# Patient Record
Sex: MALE | Race: WHITE | NOT HISPANIC OR LATINO | Employment: OTHER | URBAN - METROPOLITAN AREA
[De-identification: names, ages, dates, MRNs, and addresses within clinical notes are randomized per-mention and may not be internally consistent; named-entity substitution may affect disease eponyms.]

---

## 2017-05-03 ENCOUNTER — AMBULATORY - HEALTHEAST (OUTPATIENT)
Dept: CARDIOLOGY | Facility: CLINIC | Age: 47
End: 2017-05-03

## 2017-05-08 ENCOUNTER — COMMUNICATION - HEALTHEAST (OUTPATIENT)
Dept: TELEHEALTH | Facility: CLINIC | Age: 47
End: 2017-05-08

## 2017-05-08 ENCOUNTER — OFFICE VISIT - HEALTHEAST (OUTPATIENT)
Dept: CARDIOLOGY | Facility: CLINIC | Age: 47
End: 2017-05-08

## 2017-05-08 ENCOUNTER — AMBULATORY - HEALTHEAST (OUTPATIENT)
Dept: CARDIOLOGY | Facility: CLINIC | Age: 47
End: 2017-05-08

## 2017-05-08 DIAGNOSIS — I10 ESSENTIAL HYPERTENSION WITH GOAL BLOOD PRESSURE LESS THAN 130/85: ICD-10-CM

## 2017-05-08 LAB
ATRIAL RATE - MUSE: 77 BPM
DIASTOLIC BLOOD PRESSURE - MUSE: NORMAL MMHG
INTERPRETATION ECG - MUSE: NORMAL
P AXIS - MUSE: 62 DEGREES
PR INTERVAL - MUSE: 154 MS
QRS DURATION - MUSE: 114 MS
QT - MUSE: 390 MS
QTC - MUSE: 441 MS
R AXIS - MUSE: 92 DEGREES
SYSTOLIC BLOOD PRESSURE - MUSE: NORMAL MMHG
T AXIS - MUSE: 16 DEGREES
VENTRICULAR RATE- MUSE: 77 BPM

## 2017-05-08 ASSESSMENT — MIFFLIN-ST. JEOR: SCORE: 2159.46

## 2017-05-22 ENCOUNTER — HOSPITAL ENCOUNTER (OUTPATIENT)
Dept: CARDIOLOGY | Facility: HOSPITAL | Age: 47
Discharge: HOME | End: 2017-05-22
Attending: INTERNAL MEDICINE

## 2017-05-22 DIAGNOSIS — I10 ESSENTIAL HYPERTENSION WITH GOAL BLOOD PRESSURE LESS THAN 130/85: ICD-10-CM

## 2017-05-22 LAB
AORTIC ROOT: 3.4 CM
AORTIC VALVE MEAN VELOCITY: 59.5 CM/S
AORTIC VALVE MEAN VELOCITY: 79.5 CM/S
AORTIC VALVE MEAN VELOCITY: 89.7 CM/S
AV DIMENSIONLESS INDEX VTI: 0.8
AV DIMENSIONLESS INDEX VTI: 1
AV MEAN GRADIENT: 2 MMHG
AV MEAN GRADIENT: 3 MMHG
AV MEAN GRADIENT: 4 MMHG
AV PEAK GRADIENT: 6.3 MMHG
AV PEAK GRADIENT: 6.5 MMHG
AV VALVE AREA: 2.8 CM2
AV VALVE AREA: 3.5 CM2
AV VELOCITY RATIO: 0.7
AV VELOCITY RATIO: 0.7
BSA FOR ECHO PROCEDURE: 2.53 M2
CV BLOOD PRESSURE: NORMAL MMHG
CV ECHO HEIGHT: 74 IN
CV ECHO WEIGHT: 270 LBS
CV STRESS CURRENT BP HE: NORMAL
CV STRESS CURRENT HR HE: 107
CV STRESS CURRENT HR HE: 115
CV STRESS CURRENT HR HE: 115
CV STRESS CURRENT HR HE: 120
CV STRESS CURRENT HR HE: 121
CV STRESS CURRENT HR HE: 122
CV STRESS CURRENT HR HE: 123
CV STRESS CURRENT HR HE: 132
CV STRESS CURRENT HR HE: 137
CV STRESS CURRENT HR HE: 142
CV STRESS CURRENT HR HE: 142
CV STRESS CURRENT HR HE: 144
CV STRESS CURRENT HR HE: 148
CV STRESS CURRENT HR HE: 148
CV STRESS CURRENT HR HE: 153
CV STRESS CURRENT HR HE: 159
CV STRESS CURRENT HR HE: 75
CV STRESS CURRENT HR HE: 77
CV STRESS CURRENT HR HE: 79
CV STRESS CURRENT HR HE: 90
CV STRESS CURRENT HR HE: 92
CV STRESS CURRENT HR HE: 94
CV STRESS CURRENT HR HE: 96
CV STRESS CURRENT HR HE: 97
CV STRESS CURRENT HR HE: 99
CV STRESS DEVIATION TIME HE: NORMAL
CV STRESS ECHO PERCENT HR HE: NORMAL
CV STRESS EXERCISE STAGE HE: NORMAL
CV STRESS EXERCISE STAGE REACHED HE: NORMAL
CV STRESS FINAL RESTING BP HE: NORMAL
CV STRESS FINAL RESTING HR HE: 92
CV STRESS MAX HR HE: 159
CV STRESS MAX TREADMILL GRADE HE: 14
CV STRESS MAX TREADMILL SPEED HE: 3.4
CV STRESS PEAK DIA BP HE: NORMAL
CV STRESS PEAK SYS BP HE: NORMAL
CV STRESS PHASE HE: NORMAL
CV STRESS PROTOCOL HE: NORMAL
CV STRESS RESTING PT POSITION HE: NORMAL
CV STRESS RESTING PT POSITION HE: NORMAL
CV STRESS ST DEVIATION AMOUNT HE: NORMAL
CV STRESS ST DEVIATION ELEVATION HE: NORMAL
CV STRESS ST EVELATION AMOUNT HE: NORMAL
CV STRESS TEST TYPE HE: NORMAL
CV STRESS TOTAL STAGE TIME MIN 1 HE: NORMAL
DOP CALC AO PEAK VEL: 123 CM/S
DOP CALC AO PEAK VEL: 125 CM/S
DOP CALC AO PEAK VEL: 127 CM/S
DOP CALC AO PEAK VEL: 127 CM/S
DOP CALC AO VTI: 18 CM
DOP CALC AO VTI: 22.1 CM
DOP CALC AO VTI: 23.9 CM
DOP CALC LVOT AREA: 3.46 CM2
DOP CALC LVOT AREA: 3.46 CM2
DOP CALC LVOT DIAMETER: 2.1 CM
DOP CALC LVOT DIAMETER: 2.1 CM
DOP CALC LVOT PEAK VEL: 87.2 CM/S
DOP CALC LVOT PEAK VEL: 87.6 CM/S
DOP CALC LVOT PEAK VEL: 87.9 CM/S
DOP CALC LVOT PEAK VEL: 87.9 CM/S
DOP CALC LVOT STROKE VOLUME: 62 CM3
DOP CALC LVOT STROKE VOLUME: 62.3 CM3
DOP CALCLVOT PEAK VEL VTI: 17.9 CM
DOP CALCLVOT PEAK VEL VTI: 18 CM
EJECTION FRACTION: 65 % (ref 55–75)
FRACTIONAL SHORTENING: 41.8 % (ref 28–44)
FRACTIONAL SHORTENING: 41.8 % (ref 28–44)
INTERVENTRICULAR SEPTUM IN END DIASTOLE: 1.2 CM (ref 0.6–1)
INTERVENTRICULAR SEPTUM IN END DIASTOLE: 1.2 CM (ref 0.6–1)
IVS/PW RATIO: 0.8
IVS/PW RATIO: 0.9
LA AREA 1: 16.5 CM2
LA AREA 1: 16.5 CM2
LA AREA 1: 17 CM2
LA AREA 2: 16 CM2
LA AREA 2: 16.1 CM2
LA AREA 2: 16.1 CM2
LEFT ATRIUM LENGTH: 4.9 CM
LEFT ATRIUM LENGTH: 4.9 CM
LEFT ATRIUM LENGTH: 5.3 CM
LEFT ATRIUM SIZE: 3.3 CM
LEFT ATRIUM TO AORTIC ROOT RATIO: 1 NO UNITS
LEFT ATRIUM VOLUME INDEX: 18.6 ML/M2
LEFT ATRIUM VOLUME: 46.1 CM3
LEFT ATRIUM VOLUME: 47.2 CM3
LEFT VENTRICLE CARDIAC INDEX: 2.1 L/MIN/M2
LEFT VENTRICLE CARDIAC OUTPUT: 5.2 L/MIN
LEFT VENTRICLE CARDIAC OUTPUT: 5.2 L/MIN
LEFT VENTRICLE DIASTOLIC VOLUME: 106 CM3 (ref 62–150)
LEFT VENTRICLE HEART RATE: 84 BPM
LEFT VENTRICLE HEART RATE: 84 BPM
LEFT VENTRICLE MASS INDEX: 120.3 G/M2
LEFT VENTRICLE SYSTOLIC VOLUME: 37.5 CM3 (ref 21–61)
LEFT VENTRICULAR INTERNAL DIMENSION IN DIASTOLE: 5.5 CM (ref 4.2–5.8)
LEFT VENTRICULAR INTERNAL DIMENSION IN DIASTOLE: 5.5 CM (ref 4.2–5.8)
LEFT VENTRICULAR INTERNAL DIMENSION IN SYSTOLE: 3.2 CM (ref 2.5–4)
LEFT VENTRICULAR INTERNAL DIMENSION IN SYSTOLE: 3.2 CM (ref 2.5–4)
LEFT VENTRICULAR MASS: 304.3 G
LEFT VENTRICULAR MASS: 320.9 G
LEFT VENTRICULAR OUTFLOW TRACT MEAN GRADIENT: 2 MMHG
LEFT VENTRICULAR OUTFLOW TRACT MEAN GRADIENT: 2 MMHG
LEFT VENTRICULAR OUTFLOW TRACT MEAN VELOCITY: 59.2 CM/S
LEFT VENTRICULAR OUTFLOW TRACT MEAN VELOCITY: 59.5 CM/S
LEFT VENTRICULAR OUTFLOW TRACT PEAK GRADIENT: 3 MMHG
LEFT VENTRICULAR OUTFLOW TRACT PEAK GRADIENT: 3 MMHG
LEFT VENTRICULAR POSTERIOR WALL IN END DIASTOLE: 1.4 CM (ref 0.6–1)
LEFT VENTRICULAR POSTERIOR WALL IN END DIASTOLE: 1.5 CM (ref 0.6–1)
LV STROKE VOLUME INDEX: 24.6 ML/M2
MITRAL VALVE E/A RATIO: 1.4
MITRAL VALVE E/A RATIO: 1.4
MV DECELERATION TIME: 158 MS
MV DECELERATION TIME: 158 MS
MV PEAK A VELOCITY: 46 CM/S
MV PEAK A VELOCITY: 46.6 CM/S
MV PEAK A VELOCITY: 46.6 CM/S
MV PEAK E VELOCITY: 66 CM/S
NUC REST DIASTOLIC VOLUME INDEX: 4320 LBS
NUC REST SYSTOLIC VOLUME INDEX: 74 IN
NUC STRESS EJECTION FRACTION: 100 %
RIGHT VENTRICULAR INTERNAL DIMENSION IN DYSTOLE: 2.9 CM
STRESS ECHO BASELINE BP: NORMAL
STRESS ECHO BASELINE HR: 77
STRESS ECHO CALCULATED PERCENT HR: 91 %
STRESS ECHO LAST STRESS BP: NORMAL
STRESS ECHO LAST STRESS HR: 159
STRESS ECHO POST ESTIMATED WORKLOAD: 9.7
STRESS ECHO POST EXERCISE DUR MIN: 8
STRESS ECHO POST EXERCISE DUR SEC: 0
STRESS ECHO TARGET HR: 159.25
TRICUSPID VALVE ANULAR PLANE SYSTOLIC EXCURSION: 2.6 CM

## 2017-05-22 ASSESSMENT — MIFFLIN-ST. JEOR: SCORE: 2159.46

## 2017-06-02 ENCOUNTER — AMBULATORY - HEALTHEAST (OUTPATIENT)
Dept: CARDIOLOGY | Facility: CLINIC | Age: 47
End: 2017-06-02

## 2017-06-02 DIAGNOSIS — I10 ESSENTIAL HYPERTENSION WITH GOAL BLOOD PRESSURE LESS THAN 130/85: ICD-10-CM

## 2017-06-09 ENCOUNTER — RECORDS - HEALTHEAST (OUTPATIENT)
Dept: ADMINISTRATIVE | Facility: OTHER | Age: 47
End: 2017-06-09

## 2017-06-30 ENCOUNTER — OFFICE VISIT - HEALTHEAST (OUTPATIENT)
Dept: SLEEP MEDICINE | Facility: CLINIC | Age: 47
End: 2017-06-30

## 2017-06-30 ENCOUNTER — HOSPITAL ENCOUNTER (OUTPATIENT)
Dept: ULTRASOUND IMAGING | Facility: HOSPITAL | Age: 47
Discharge: HOME | End: 2017-06-30
Attending: SURGERY

## 2017-06-30 DIAGNOSIS — G47.8 SLEEP DYSFUNCTION WITH SLEEP STAGE DISTURBANCE: ICD-10-CM

## 2017-06-30 DIAGNOSIS — R06.83 SNORING: ICD-10-CM

## 2017-06-30 DIAGNOSIS — R29.818 SUSPECTED SLEEP APNEA: ICD-10-CM

## 2017-06-30 DIAGNOSIS — E04.1 RIGHT THYROID NODULE: ICD-10-CM

## 2017-06-30 DIAGNOSIS — G47.10 HYPERSOMNIA, UNSPECIFIED: ICD-10-CM

## 2017-06-30 DIAGNOSIS — H91.90 PERCEIVED HEARING CHANGES: ICD-10-CM

## 2017-06-30 ASSESSMENT — MIFFLIN-ST. JEOR: SCORE: 2143.59

## 2017-07-02 ENCOUNTER — RECORDS - HEALTHEAST (OUTPATIENT)
Dept: SLEEP MEDICINE | Facility: CLINIC | Age: 47
End: 2017-07-02

## 2017-07-02 ENCOUNTER — RECORDS - HEALTHEAST (OUTPATIENT)
Dept: ADMINISTRATIVE | Facility: OTHER | Age: 47
End: 2017-07-02

## 2017-07-02 DIAGNOSIS — G47.10 HYPERSOMNIA, UNSPECIFIED: ICD-10-CM

## 2017-07-02 DIAGNOSIS — G47.30 SLEEP APNEA, UNSPECIFIED: ICD-10-CM

## 2017-07-02 DIAGNOSIS — G47.8 OTHER SLEEP DISORDERS: ICD-10-CM

## 2017-07-02 DIAGNOSIS — R06.83 SNORING: ICD-10-CM

## 2017-07-07 ENCOUNTER — COMMUNICATION - HEALTHEAST (OUTPATIENT)
Dept: SLEEP MEDICINE | Facility: CLINIC | Age: 47
End: 2017-07-07

## 2017-08-17 ENCOUNTER — TRANSFERRED RECORDS (OUTPATIENT)
Dept: HEALTH INFORMATION MANAGEMENT | Facility: CLINIC | Age: 47
End: 2017-08-17

## 2017-08-18 ENCOUNTER — AMBULATORY - HEALTHEAST (OUTPATIENT)
Dept: SCHEDULING | Facility: CLINIC | Age: 47
End: 2017-08-18

## 2017-08-18 DIAGNOSIS — E66.3 OVERWEIGHT: ICD-10-CM

## 2017-08-21 ENCOUNTER — HOSPITAL ENCOUNTER (OUTPATIENT)
Dept: NUTRITION | Facility: HOSPITAL | Age: 47
Discharge: HOME | End: 2017-08-21
Attending: FAMILY MEDICINE

## 2017-08-21 DIAGNOSIS — E66.3 OVERWEIGHT: ICD-10-CM

## 2017-08-29 ENCOUNTER — COMMUNICATION - HEALTHEAST (OUTPATIENT)
Dept: SLEEP MEDICINE | Facility: CLINIC | Age: 47
End: 2017-08-29

## 2017-09-18 ENCOUNTER — OFFICE VISIT - HEALTHEAST (OUTPATIENT)
Dept: SLEEP MEDICINE | Facility: CLINIC | Age: 47
End: 2017-09-18

## 2017-09-18 DIAGNOSIS — R79.9 ABNORMAL BLOOD CHEMISTRY: ICD-10-CM

## 2017-09-18 DIAGNOSIS — R06.83 SNORING: ICD-10-CM

## 2017-09-18 DIAGNOSIS — G25.81 RESTLESS LEG SYNDROME: ICD-10-CM

## 2017-09-18 DIAGNOSIS — G47.10 HYPERSOMNIA, UNSPECIFIED: ICD-10-CM

## 2017-09-18 ASSESSMENT — MIFFLIN-ST. JEOR: SCORE: 2128.62

## 2017-09-19 ENCOUNTER — COMMUNICATION - HEALTHEAST (OUTPATIENT)
Dept: SLEEP MEDICINE | Facility: CLINIC | Age: 47
End: 2017-09-19

## 2017-11-03 ENCOUNTER — OFFICE VISIT - HEALTHEAST (OUTPATIENT)
Dept: SLEEP MEDICINE | Facility: CLINIC | Age: 47
End: 2017-11-03

## 2017-11-03 DIAGNOSIS — G47.8 SLEEP DYSFUNCTION WITH SLEEP STAGE DISTURBANCE: ICD-10-CM

## 2017-11-03 DIAGNOSIS — G47.10 HYPERSOMNIA: ICD-10-CM

## 2017-11-03 DIAGNOSIS — R79.9 ABNORMAL BLOOD CHEMISTRY: ICD-10-CM

## 2017-11-03 DIAGNOSIS — G25.81 RESTLESS LEG SYNDROME: ICD-10-CM

## 2017-11-03 ASSESSMENT — MIFFLIN-ST. JEOR: SCORE: 2112.29

## 2018-01-21 ENCOUNTER — OFFICE VISIT - HEALTHEAST (OUTPATIENT)
Dept: FAMILY MEDICINE | Facility: CLINIC | Age: 48
End: 2018-01-21

## 2018-01-21 DIAGNOSIS — R05.9 COUGH: ICD-10-CM

## 2018-01-21 DIAGNOSIS — R07.0 THROAT PAIN: ICD-10-CM

## 2018-01-21 LAB — DEPRECATED S PYO AG THROAT QL EIA: NORMAL

## 2018-01-22 LAB — GROUP A STREP BY PCR: NORMAL

## 2018-04-11 DIAGNOSIS — M17.11 PRIMARY OSTEOARTHRITIS OF RIGHT KNEE: Primary | ICD-10-CM

## 2018-04-11 ASSESSMENT — ENCOUNTER SYMPTOMS
FATIGUE: 1
EXERCISE INTOLERANCE: 0
WEIGHT LOSS: 0
ORTHOPNEA: 0
MUSCLE WEAKNESS: 0
JOINT SWELLING: 0
MYALGIAS: 1
POLYPHAGIA: 0
STIFFNESS: 1
PALPITATIONS: 0
SLEEP DISTURBANCES DUE TO BREATHING: 0
NIGHT SWEATS: 0
SYNCOPE: 0
HYPOTENSION: 0
LEG PAIN: 1
DECREASED APPETITE: 0
HYPERTENSION: 1
MUSCLE CRAMPS: 0
POLYDIPSIA: 0
CHILLS: 0
ARTHRALGIAS: 1
NECK PAIN: 1
LIGHT-HEADEDNESS: 0
ALTERED TEMPERATURE REGULATION: 0
BACK PAIN: 0
HALLUCINATIONS: 0
FEVER: 0
WEIGHT GAIN: 1
INCREASED ENERGY: 0

## 2018-04-12 ENCOUNTER — RADIANT APPOINTMENT (OUTPATIENT)
Dept: GENERAL RADIOLOGY | Facility: CLINIC | Age: 48
End: 2018-04-12
Attending: NURSE PRACTITIONER
Payer: COMMERCIAL

## 2018-04-12 ENCOUNTER — OFFICE VISIT (OUTPATIENT)
Dept: ORTHOPEDICS | Facility: CLINIC | Age: 48
End: 2018-04-12
Payer: COMMERCIAL

## 2018-04-12 DIAGNOSIS — M17.12 PRIMARY OSTEOARTHRITIS OF LEFT KNEE: ICD-10-CM

## 2018-04-12 DIAGNOSIS — M17.31 POST-TRAUMATIC OSTEOARTHRITIS OF RIGHT KNEE: Primary | ICD-10-CM

## 2018-04-12 DIAGNOSIS — M17.31 POST-TRAUMATIC OSTEOARTHRITIS OF RIGHT KNEE: ICD-10-CM

## 2018-04-12 ASSESSMENT — PAIN SCALES - GENERAL: PAINLEVEL: MODERATE PAIN (5)

## 2018-04-12 NOTE — LETTER
"4/12/2018       RE: Aydin Aldridge  [address] [facility] [place] [address]     Dear Colleague,    Thank you for referring your patient, Aydin Aldridge, to the [facility] at [facility]. Please see a copy of my visit note below.    Cc: bilateral knee pain    HPI:  Aydin is a previous well known patient to myself and Dr. Chang who underwent a right femoral lengthening procedure with external fixation about 12 years ago. He has post traumatic right knee osteoarthritis to which he has managed well with stretching, nsaids, and judicious use of steroid injections and Supartz. He rates his right knee pain as \"consistent 5\" but worse at times depending on activity. Has activity related swelling. Has night pain. Denies instability \"just aches\".     Left knee reveals increased achiness over last few months without injuries, surgeries, falls, twists, or trauma. Localizes the pain as mostly medial. Denies mechanical symptoms. With a lot of activity he does report \"some swelling\". Takes occasional advil which \"takes the edge off\" otherwise just \"guy through it\". Rates left knee pain as \"6 or 7\". Notices the \"intense ache more at night and with stairs\". He has attempted on multiple occasions various types of physical therapy and strengthening exercises that have not provided adequate relief.     PMH: Reviewed    ROS: Reviewed    Meds: Reviewed    ALL: Reviewed    FH/SH: Reviewed-nonsmoker--    PE:  Pleasant and cooperative male alert and oriented x3. Walks with a slight limp favoring right knee with the inability to fully extend on walk through phase of gait. Arises from chair unguarded. Slight limp to gait noted. Right knee reveals - degrees of motion. Strength is symmetrical at 5/5. Incisions are all well healed/intact. No erythema and nontender to palpate surrounding tissue. Medial and lateral joint space has tenderness to palpate. Patella has crepitus " that is not painful with near normal alignment without subluxation. No calf pain. Lachmans exam has an endpoint but soft in nature. Mild pseudolaxity in valgus/varus in 0, 45, and 90 degrees. Mild joint effusion. No palpable bakers cyst. Alignment is near neutral with slight varus. + CMS    Left knee reveals 0-122 degrees of motion without a significant joint effusion. No erythema. Tenderness noted to palpate medial joint line. Nontender to lateral joint. Patella tracks appropriately without pain and in normal alignment without subluxation. Skin intact without rashes, scars, bruises, or lesions. Negative lachmans exam. Minimal pseudolaxity on valgus/varus stress at 0, 45, and 90 degrees. Quad strength 5/5. No calf pain without neurological compromise. Alignment is near neutral.    Xrays taken:      Dx:  1. Post traumatic right knee osteoarthritis  2. 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 The natural plan of care and discussion including activity modification, assistive devices, nsaids, injections, therapy, and strengthening. He is requesting a repeat of Supartz at this time to the right knee and possiblity a cortisone to the left knee.  Informed consent obtained.      Injection #1 Left knee cortisone injection    Pre Procedure dx: left knee osteoarthritis    Post Procedure: left knee osteoarthritis    Procedure:  Informed consent obtained. Under sterile technique, the anterior lateral aspect of left knee was prepped with chlorahexadine. 1cc of 40 mg of kenalog and 2 cc of lidocaine was injected into the joint space without difficulty.      Injection #2 Right knee supartz injection    Pre Procedure dx: right knee osteoarthritis    Post Procedure: right knee osteoarthritis    Procedure:  Informed consent obtained. Under sterile technique, the anterior lateral aspect of the right knee was prepped with chlorahexadine. Supartz was injected into the lateral joint without difficulty.  Will  follow up next week or 2nd injection.       Again, thank you for allowing me to participate in the care of your patient.      Sincerely,    GENEVIEVE Sykes CNP

## 2018-04-12 NOTE — NURSING NOTE
Reason For Visit:   Chief Complaint   Patient presents with     RECHECK     patient is here for his #3 supartz to the right knee       Patient states that his pain level varies from 5-7    The following medication was given:     MEDICATION: supartz  ROUTE: IA  SITE: right knee   DOSE: 2.5cc  LOT #: [id number]  :  Seikagaku   EXPIRATION DATE:  07-    MEDICATION: kenolag-40  ROUTE: IA  SITE: left knee  DOSE: 1cc  LOT #: [id number]  :  Triggit  EXPIRATION DATE:    NDC: 8051-9666-03             Carlene Brizuela CMA

## 2018-04-12 NOTE — MR AVS SNAPSHOT
After Visit Summary   4/12/2018    Aydin Aldridge    MRN: [id number]           Patient Information     Date Of Birth          1970        Visit Information        Provider Department      4/12/2018 10:30 AM Romina Hartman APRN CNP [facility]        Today's Diagnoses     Post-traumatic osteoarthritis of right knee    -  1    Primary osteoarthritis of left knee           Follow-ups after your visit        Follow-up notes from your care team     Return in about 1 year (around 4/12/2019).      Your next 10 appointments already scheduled     Apr 19, 2018 10:15 AM CDT   (Arrive by 10:00 AM)   Return Visit with GENEVIEVE Hartley CNP   [facility] ([facility])    [address] [place] [address] [phone]            Apr 26, 2018 10:15 AM CDT   (Arrive by 10:00 AM)   Return Visit with GENEVIEVE Hartley CNP   [facility] ([facility])    [address] [place] [address] [phone]            May 21, 2018  8:30 AM CDT   (Arrive by 8:15 AM)   RETURN KNEE with Tyrese Chang MD   [facility] ([facility])    [address] [place] [address] [phone]              Who to contact     Please call your clinic at [phone] to:    Ask questions about your health    Make or cancel appointments    Discuss your medicines    Learn about your test results    Speak to your doctor            Additional Information About Your Visit        MyChart Information     [employer] gives you secure access to your electronic health record. If you see a primary care provider, you can also send messages to your care team and make appointments. If you have questions, please call your primary care clinic.  If you do not have a primary care provider, please call [phone] and they will assist  you.      [employer] is an electronic gateway that provides easy, online access to your medical records. With [employer], you can request a clinic appointment, read your test results, renew a prescription or communicate with your care team.     To access your existing account, please contact your [facility] or call [phone] for assistance.        Care EveryWhere ID     This is your Care EveryWhere ID. This could be used by other organizations to access your [facility] medical records  [id number]         Blood Pressure from Last 3 Encounters:   05/07/04 140/76   04/12/04 138/82   08/18/03 132/90    Weight from Last 3 Encounters:   05/07/04 97.1 kg (214 lb)   04/12/04 96.6 kg (213 lb)   08/18/03 95.7 kg (211 lb)                 Today's Medication Changes          These changes are accurate as of 4/12/18  3:46 PM.  If you have any questions, ask your nurse or doctor.               Start taking these medicines.        Dose/Directions    Hylan 16 MG/2ML Sosy   Used for:  Post-traumatic osteoarthritis of right knee   Started by:  Romina Hartman, APRN CNP        Dose:  1 Syringe   1 Syringe by INTRA-ARTICULAR route once for 1 dose   Quantity:  1 Syringe   Refills:  0            Where to get your medicines      Some of these will need a paper prescription and others can be bought over the counter.  Ask your nurse if you have questions.     You don't need a prescription for these medications     Hylan 16 MG/2ML Sosy                Primary Care Provider Office Phone # Fax #    Hannah GENEVIEVE Dobbins [phone]       [employer] [address] [facility] [address]        Equal Access to Services     [facility] : Hadii aad ku hadasho Soomaali, waaxda luqadaha, qaybta kaalmada mark, dary trujillo. So [facility] [phone].    ATENCIÓN: Si habla español, tiene a flowers disposición servicios gratuitos de asistencia lingüística. Llame al  [phone].    We comply with applicable federal civil rights laws and [place] laws. We do not discriminate on the basis of race, color, national origin, age, disability, sex, sexual orientation, or gender identity.            Thank you!     Thank you for choosing [facility]  for your care. Our goal is always to provide you with excellent care. Hearing back from our patients is one way we can continue to improve our services. Please take a few minutes to complete the written survey that you may receive in the mail after your visit with us. Thank you!             Your Updated Medication List - Protect others around you: Learn how to safely use, store and throw away your medicines at www.disposemymeds.org.          This list is accurate as of 4/12/18  3:46 PM.  Always use your most recent med list.                   Brand Name Dispense Instructions for use Diagnosis    Hylan 16 MG/2ML Sosy     1 Syringe    1 Syringe by INTRA-ARTICULAR route once for 1 dose    Post-traumatic osteoarthritis of right knee       MULTIVITAMIN ADULT PO           OMEPRAZOLE PO           sodium hyaluronate 25 MG/2.5ML injection    SUPARTZ    1 Syringe    2.5 mLs (25 mg) by INTRA-ARTICULAR route once a week    Primary osteoarthritis of right knee

## 2018-04-12 NOTE — PROGRESS NOTES
"Cc: bilateral knee pain    HPI:  Aydin is a previous well known patient to myself and Dr. Chang who underwent a right femoral lengthening procedure with external fixation about 12 years ago. He has post traumatic right knee osteoarthritis to which he has managed well with stretching, nsaids, and judicious use of steroid injections and Supartz. He rates his right knee pain as \"consistent 5\" but worse at times depending on activity. Has activity related swelling. Has night pain. Denies instability \"just aches\".     Left knee reveals increased achiness over last few months without injuries, surgeries, falls, twists, or trauma. Localizes the pain as mostly medial. Denies mechanical symptoms. With a lot of activity he does report \"some swelling\". Takes occasional advil which \"takes the edge off\" otherwise just \"guy through it\". Rates left knee pain as \"6 or 7\". Notices the \"intense ache more at night and with stairs\". He has attempted on multiple occasions various types of physical therapy and strengthening exercises that have not provided adequate relief.     PMH: Reviewed    ROS: Reviewed    Meds: Reviewed    ALL: Reviewed    FH/SH: Reviewed-nonsmoker--    PE:  Pleasant and cooperative male alert and oriented x3. Walks with a slight limp favoring right knee with the inability to fully extend on walk through phase of gait. Arises from chair unguarded. Slight limp to gait noted. Right knee reveals - degrees of motion. Strength is symmetrical at 5/5. Incisions are all well healed/intact. No erythema and nontender to palpate surrounding tissue. Medial and lateral joint space has tenderness to palpate. Patella has crepitus that is not painful with near normal alignment without subluxation. No calf pain. Lachmans exam has an endpoint but soft in nature. Mild pseudolaxity in valgus/varus in 0, 45, and 90 degrees. Mild joint effusion. No palpable bakers cyst. Alignment is near neutral with slight varus. + " CMS    Left knee reveals 0-122 degrees of motion without a significant joint effusion. No erythema. Tenderness noted to palpate medial joint line. Nontender to lateral joint. Patella tracks appropriately without pain and in normal alignment without subluxation. Skin intact without rashes, scars, bruises, or lesions. Negative lachmans exam. Minimal pseudolaxity on valgus/varus stress at 0, 45, and 90 degrees. Quad strength 5/5. No calf pain without neurological compromise. Alignment is near neutral.    Xrays taken:      Dx:  1. Post traumatic right knee osteoarthritis  2. 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 The natural plan of care and discussion including activity modification, assistive devices, nsaids, injections, therapy, and strengthening. He is requesting a repeat of Supartz at this time to the right knee and possiblity a cortisone to the left knee.  Informed consent obtained.      Injection #1 Left knee cortisone injection    Pre Procedure dx: left knee osteoarthritis    Post Procedure: left knee osteoarthritis    Procedure:  Informed consent obtained. Under sterile technique, the anterior lateral aspect of left knee was prepped with chlorahexadine. 1cc of 40 mg of kenalog and 2 cc of lidocaine was injected into the joint space without difficulty.      Injection #2 Right knee supartz injection    Pre Procedure dx: right knee osteoarthritis    Post Procedure: right knee osteoarthritis    Procedure:  Informed consent obtained. Under sterile technique, the anterior lateral aspect of the right knee was prepped with chlorahexadine. Supartz was injected into the lateral joint without difficulty.  Will follow up next week or 2nd injection.       Answers for HPI/ROS submitted by the patient on 4/11/2018   General Symptoms: Yes  Skin Symptoms: No  HENT Symptoms: No  EYE SYMPTOMS: No  HEART SYMPTOMS: Yes  LUNG SYMPTOMS: No  INTESTINAL SYMPTOMS: No  URINARY SYMPTOMS: No  REPRODUCTIVE  SYMPTOMS: No  SKELETAL SYMPTOMS: Yes  BLOOD SYMPTOMS: No  NERVOUS SYSTEM SYMPTOMS: No  MENTAL HEALTH SYMPTOMS: No  Fever: No  Loss of appetite: No  Weight loss: No  Weight gain: Yes  Fatigue: Yes  Night sweats: No  Chills: No  Increased stress: Yes  Excessive hunger: No  Excessive thirst: No  Feeling hot or cold when others believe the temperature is normal: No  Loss of height: No  Post-operative complications: No  Surgical site pain: No  Hallucinations: No  Change in or Loss of Energy: No  Hyperactivity: No  Confusion: No  Chest pain or pressure: No  Fast or irregular heartbeat: No  Pain in legs with walking: Yes  Trouble breathing while lying down: No  Fingers or toes appear blue: No  High blood pressure: Yes  Low blood pressure: No  Fainting: No  Murmurs: No  Pacemaker: No  Varicose veins: No  Edema or swelling: No  Wake up at night with shortness of breath: No  Light-headedness: No  Exercise intolerance: No  Back pain: No  Muscle aches: Yes  Neck pain: Yes  Swollen joints: No  Joint pain: Yes  Bone pain: No  Muscle cramps: No  Muscle weakness: No  Joint stiffness: Yes  Bone fracture: No

## 2018-04-18 ENCOUNTER — DOCUMENTATION ONLY (OUTPATIENT)
Dept: ORTHOPEDICS | Facility: CLINIC | Age: 48
End: 2018-04-18

## 2018-04-18 NOTE — PROGRESS NOTES
Pt was seen last week for visco supplement injection Supartz right knee, steroid left knee.  [facility] gave us notice that pt's insurance no longer covers Supartz.  [facility] was phoned regarding switching visco supplement to covered injection Synvisc and we were told there is no evidence for contraindication changing from one to a similar visco supplement injection if keeping within the dame dosing periods, once weekly for three weeks.  Synvisc injections have been authorized.  Pt was called three times, once today, two days ago, and last week, he did not answer.  We will update pt tomorrow in clinic.

## 2018-04-19 ENCOUNTER — OFFICE VISIT (OUTPATIENT)
Dept: ORTHOPEDICS | Facility: CLINIC | Age: 48
End: 2018-04-19
Payer: COMMERCIAL

## 2018-04-19 DIAGNOSIS — M17.32 POST-TRAUMATIC OSTEOARTHRITIS OF LEFT KNEE: Primary | ICD-10-CM

## 2018-04-19 NOTE — PROGRESS NOTES
"Discussion held prior to injection on insurance coverage with injections. Last week we received authorization to proceed with Supartz injection to his R knee that he has received \"for many years\". He reports his insurance has not changed. After the injection was given, we received word that \"insurance does not cover supartz any longer\". Financial counselor informed and patient will be in contact with them for financial coverage in regards to this.   At this time, the  noted \"no contraindication to now do synvisc injections\".  Patients informed consent was obtained. Risks, benefits, complications were discussed including acute swelling, shortness of breath, and increased knee pain.    Pre Procedure dx: left knee osteoarthritis    Post Procedure dx: left knee osteoarthritis    Procedure: Under sterile technique, the anterior lateral aspect of the knee was prepped with chlorahexadine. synvisc was injected into the lateral joint space without difficulty. Had patient stay for 10 minutes post injection. No obvious reaction or side effects noted.  Will follow up next week.  "

## 2018-04-19 NOTE — LETTER
"4/19/2018       RE: Aydin Aldridge  [address] [facility] [address]     Dear Colleague,    Thank you for referring your patient, Aydin Aldridge, to the [facility] at [facility]. Please see a copy of my visit note below.    Discussion held prior to injection on insurance coverage with injections. Last week we received authorization to proceed with Supartz injection to his R knee that he has received \"for many years\". He reports his insurance has not changed. After the injection was given, we received word that \"insurance does not cover supartz any longer\". Financial counselor informed and patient will be in contact with them for financial coverage in regards to this.   At this time, the  noted \"no contraindication to now do synvisc injections\".  Patients informed consent was obtained. Risks, benefits, complications were discussed including acute swelling, shortness of breath, and increased knee pain.    Pre Procedure dx: left knee osteoarthritis    Post Procedure dx: left knee osteoarthritis    Procedure: Under sterile technique, the anterior lateral aspect of the knee was prepped with chlorahexadine. synvisc was injected into the lateral joint space without difficulty. Had patient stay for 10 minutes post injection. No obvious reaction or side effects noted.  Will follow up next week.    Again, thank you for allowing me to participate in the care of your patient.      Sincerely,    GENEVIEVE Sykes CNP      "

## 2018-04-19 NOTE — MR AVS SNAPSHOT
After Visit Summary   4/19/2018    Aydin Aldridge    MRN: [id number]           Patient Information     Date Of Birth          1970        Visit Information        Provider Department      4/19/2018 10:15 AM Romina Hartman APRN CNP [facility] [place]        Today's Diagnoses     Post-traumatic osteoarthritis of left knee    -  1       Follow-ups after your visit        Your next 10 appointments already scheduled     Apr 26, 2018 10:15 AM CDT   (Arrive by 10:00 AM)   Return Visit with GENEVIEVE Hartley CNP [facility] ([facility])    [address] [place] [address] [phone]            May 21, 2018  8:30 AM CDT   (Arrive by 8:15 AM)   RETURN KNEE with MD JUDY Nguyen [facility] [place] ([facility])    [address] [place] [address] [phone]              Who to contact     Please call your clinic at [phone] to:    Ask questions about your health    Make or cancel appointments    Discuss your medicines    Learn about your test results    Speak to your doctor            Additional Information About Your Visit        MyChart Information     [employer] gives you secure access to your electronic health record. If you see a primary care provider, you can also send messages to your care team and make appointments. If you have questions, please call your primary care clinic.  If you do not have a primary care provider, please call [phone] and they will assist you.      [employer] is an electronic gateway that provides easy, online access to your medical records. With [employer], you can request a clinic appointment, read your test results, renew a prescription or communicate with your care team.     To access your existing account, please contact your [facility] or call [phone] for assistance.        Care  EveryWhere ID     This is your Care EveryWhere ID. This could be used by other organizations to access your [facility] medical records  [id number]         Blood Pressure from Last 3 Encounters:   05/07/04 140/76   04/12/04 138/82   08/18/03 132/90    Weight from Last 3 Encounters:   05/07/04 97.1 kg (214 lb)   04/12/04 96.6 kg (213 lb)   08/18/03 95.7 kg (211 lb)              We Performed the Following     Large Joint/Bursa injection and/or drainage - Unilateral (Shoulder, Knee) [20610]          Today's Medication Changes          These changes are accurate as of 4/19/18  2:50 PM.  If you have any questions, ask your nurse or doctor.               Start taking these medicines.        Dose/Directions    Hylan 16 MG/2ML Sosy   Used for:  Post-traumatic osteoarthritis of left knee        Dose:  1 Syringe   1 Syringe by INTRA-ARTICULAR route once a week   Quantity:  1 Syringe   Refills:  1            Where to get your medicines      Some of these will need a paper prescription and others can be bought over the counter.  Ask your nurse if you have questions.     Bring a paper prescription for each of these medications     Hylan 16 MG/2ML Sosy                Primary Care Provider Office Phone # Fax #    Hannah GENEVIEVE Dobbins [phone]       [facility] [address] [facility] [address]        Equal Access to Services     MONICA NICHOLAS AH: Hadii nehemias ku hadasho Soomaali, waaxda luqadaha, qaybta kaalmada adeegyada, dary trujillo. So [facility] [phone].    ATENCIÓN: Si habla español, tiene a flowers disposición servicios gratuitos de asistencia lingüística. Llame al [phone].    We comply with applicable federal civil rights laws and [place] laws. We do not discriminate on the basis of race, color, national origin, age, disability, sex, sexual orientation, or gender identity.            Thank you!     Thank you for choosing [facility]  for your  care. Our goal is always to provide you with excellent care. Hearing back from our patients is one way we can continue to improve our services. Please take a few minutes to complete the written survey that you may receive in the mail after your visit with us. Thank you!             Your Updated Medication List - Protect others around you: Learn how to safely use, store and throw away your medicines at www.disposemymeds.org.          This list is accurate as of 4/19/18  2:50 PM.  Always use your most recent med list.                   Brand Name Dispense Instructions for use Diagnosis    Hylan 16 MG/2ML Sosy     1 Syringe    1 Syringe by INTRA-ARTICULAR route once a week    Post-traumatic osteoarthritis of left knee       MULTIVITAMIN ADULT PO           OMEPRAZOLE PO           sodium hyaluronate 25 MG/2.5ML injection    SUPARTZ    1 Syringe    2.5 mLs (25 mg) by INTRA-ARTICULAR route once a week    Primary osteoarthritis of right knee

## 2018-04-19 NOTE — NURSING NOTE
[address] [address]  Dept: [phone]  ______________________________________________________________________________    Patient: Aydin Aldridge   : 1970   MRN: [id number]   2018    INVASIVE PROCEDURE SAFETY CHECKLIST    Date: 18   Procedure:Right knee Synvisc injection  Patient Name: Aydin Aldridge  MRN: [id number]  YOB: 1970    Action: Complete sections as appropriate. Any discrepancy results in a HARD COPY until resolved.     PRE PROCEDURE:  Patient ID verified with 2 identifiers (name and  or MRN): Yes  Procedure and site verified with patient/designee (when able): Yes  Accurate consent documentation in medical record: Yes  H&P (or appropriate assessment) documented in medical record: NA  H&P must be up to 20 days prior to procedure and updates within 24 hours of procedure as applicable: NA  Relevant diagnostic and radiology test results appropriately labeled and displayed as applicable: Yes  Procedure site(s) marked with provider initials: NA    TIMEOUT:  Time-Out performed immediately prior to starting procedure, including verbal and active participation of all team members addressing the following:NA  * Correct patient identify  * Confirmed that the correct side and site are marked  * An accurate procedure consent form  * Agreement on the procedure to be done  * Correct patient position  * Relevant images and results are properly labeled and appropriately displayed  * The need to administer antibiotics or fluids for irrigation purposes during the procedure as applicable   * Safety precautions based on patient history or medication use    DURING PROCEDURE: Verification of correct person, site, and procedures any time the responsibility for care of the patient is transferred to another member of the care team.     The following medication was given:     MEDICATION:  Synvisc  ROUTE: IA  SITE: right  knee  DOSE: 2ml  LOT #: [id number]  : [employer]  EXPIRATION DATE: 09/20  NDC#: 151629   Was there drug waste? No      Jamshid Arthur, ATC  April 19, 2018

## 2018-04-26 ENCOUNTER — OFFICE VISIT (OUTPATIENT)
Dept: ORTHOPEDICS | Facility: CLINIC | Age: 48
End: 2018-04-26
Payer: COMMERCIAL

## 2018-04-26 DIAGNOSIS — M17.31 POST-TRAUMATIC OSTEOARTHRITIS OF RIGHT KNEE: Primary | ICD-10-CM

## 2018-04-26 DIAGNOSIS — M19.90 OSTEOARTHRITIS: Primary | ICD-10-CM

## 2018-04-26 NOTE — NURSING NOTE
[address] [address]  Dept: [phone]  ______________________________________________________________________________    Patient: Aydin Aldridge   : 1970   MRN: [id number]   2018    INVASIVE PROCEDURE SAFETY CHECKLIST    Date: 18   Procedure:Right knee viscosupplement injection #3  Patient Name: Aydin Aldridge  MRN: [id number]  YOB: 1970    Action: Complete sections as appropriate. Any discrepancy results in a HARD COPY until resolved.     PRE PROCEDURE:  Patient ID verified with 2 identifiers (name and  or MRN): Yes  Procedure and site verified with patient/designee (when able): Yes  Accurate consent documentation in medical record: Yes  H&P (or appropriate assessment) documented in medical record: NA  H&P must be up to 20 days prior to procedure and updates within 24 hours of procedure as applicable: NA  Relevant diagnostic and radiology test results appropriately labeled and displayed as applicable: Yes  Procedure site(s) marked with provider initials: NA    TIMEOUT:  Time-Out performed immediately prior to starting procedure, including verbal and active participation of all team members addressing the following:Yes  * Correct patient identify  * Confirmed that the correct side and site are marked  * An accurate procedure consent form  * Agreement on the procedure to be done  * Correct patient position  * Relevant images and results are properly labeled and appropriately displayed  * The need to administer antibiotics or fluids for irrigation purposes during the procedure as applicable   * Safety precautions based on patient history or medication use    DURING PROCEDURE: Verification of correct person, site, and procedures any time the responsibility for care of the patient is transferred to another member of the care team.     The following medication was given:     MEDICATION:  Synvisc  ROUTE:  IA  SITE: Right knee  DOSE: 2ml  LOT #: [id number]  : [employer]  EXPIRATION DATE: 09/20  NDC#: [address]-7   Was there drug waste? No      Jamshid Arthur, ATC  April 26, 2018

## 2018-04-26 NOTE — PROGRESS NOTES
"Procedure Note    Pre Procedure dx: right knee post traumatic osteoarthritis    Post Procedure dx: right knee post traumatic osteoarthritis    Procedure: Informed consent obtained. The anterior lateral aspect of the right knee was prepped with chlorahexadine. Synvisc was injected into the lateral joint space without difficulty. Reports already \"moderate improvement\". Will follow up as needed.  "

## 2018-04-26 NOTE — LETTER
"4/26/2018       RE: Aydin Aldridge  [address] [facility] [place] [address]     Dear Colleague,    Thank you for referring your patient, Aydin Aldridge, to the [facility] at [facility]. Please see a copy of my visit note below.    Procedure Note    Pre Procedure dx: right knee post traumatic osteoarthritis    Post Procedure dx: right knee post traumatic osteoarthritis    Procedure: Informed consent obtained. The anterior lateral aspect of the right knee was prepped with chlorahexadine. Synvisc was injected into the lateral joint space without difficulty. Reports already \"moderate improvement\". Will follow up as needed.    Again, thank you for allowing me to participate in the care of your patient.      Sincerely,    GENEVIEVE Sykes CNP      "

## 2018-04-26 NOTE — MR AVS SNAPSHOT
After Visit Summary   4/26/2018    Aydin Aldridge    MRN: [id number]           Patient Information     Date Of Birth          1970        Visit Information        Provider Department      4/26/2018 10:15 AM Romina Hartman APRN CNP [facility]        Today's Diagnoses     Post-traumatic osteoarthritis of right knee    -  1       Follow-ups after your visit        Your next 10 appointments already scheduled     May 21, 2018  8:30 AM CDT   (Arrive by 8:15 AM)   RETURN KNEE with MD JUDY Nguyen [facility] ([facility])    [address] [place] [address] [phone]              Who to contact     Please call your clinic at [phone] to:    Ask questions about your health    Make or cancel appointments    Discuss your medicines    Learn about your test results    Speak to your doctor            Additional Information About Your Visit        MyChart Information     [employer] gives you secure access to your electronic health record. If you see a primary care provider, you can also send messages to your care team and make appointments. If you have questions, please call your primary care clinic.  If you do not have a primary care provider, please call [phone] and they will assist you.      [employer] is an electronic gateway that provides easy, online access to your medical records. With Number 100, you can request a clinic appointment, read your test results, renew a prescription or communicate with your care team.     To access your existing account, please contact your [facility] or call [phone] for assistance.        Care EveryWhere ID     This is your Care EveryWhere ID. This could be used by other organizations to access your [facility] medical records  [id number]         Blood Pressure from Last 3 Encounters:   05/07/04 140/76   04/12/04 138/82   08/18/03 132/90     Weight from Last 3 Encounters:   05/07/04 97.1 kg (214 lb)   04/12/04 96.6 kg (213 lb)   08/18/03 95.7 kg (211 lb)              We Performed the Following     Large Joint/Bursa injection and/or drainage - Unilateral (Shoulder, Knee) [20610]          Today's Medication Changes          These changes are accurate as of 4/26/18 12:25 PM.  If you have any questions, ask your nurse or doctor.               These medicines have changed or have updated prescriptions.        Dose/Directions    * Hylan 16 MG/2ML Sosy   This may have changed:  Another medication with the same name was added. Make sure you understand how and when to take each.   Used for:  Post-traumatic osteoarthritis of left knee   Changed by:  Romina Hartman APRN CNP        Dose:  1 Syringe   1 Syringe by INTRA-ARTICULAR route once a week   Quantity:  1 Syringe   Refills:  1       * Hylan 16 MG/2ML Sosy   This may have changed:  You were already taking a medication with the same name, and this prescription was added. Make sure you understand how and when to take each.   Used for:  Osteoarthritis   Changed by:  Romina Hartman APRN CNP        Dose:  1 Syringe   1 Syringe by INTRA-ARTICULAR route once for 1 dose   Quantity:  1 Syringe   Refills:  0       * Notice:  This list has 2 medication(s) that are the same as other medications prescribed for you. Read the directions carefully, and ask your doctor or other care provider to review them with you.      Stop taking these medicines if you haven't already. Please contact your care team if you have questions.     sodium hyaluronate 25 MG/2.5ML injection   Commonly known as:  SUPARTZ   Stopped by:  Romina Hartman APRN CNP                Where to get your medicines      Some of these will need a paper prescription and others can be bought over the counter.  Ask your nurse if you have questions.     Bring a paper prescription for each of these medications     Hylan 16 MG/2ML Sosy                Primary  Care Provider Office Phone # Fax #    GENEVIEVE Blanca [phone]       [facility] [place] [facility] [address] [facility] [address]        Equal Access to Services     MONICA NICHOLAS : Hadii aad ku hadjimo Soomaali, waaxda luqadaha, qaybta kaalmada adeegyada, waxay idiin haymaxinen ademeg khcesilia la'armand trujillo. So [facility] [phone].    ATENCIÓN: Si habla español, tiene a flowers disposición servicios gratuitos de asistencia lingüística. Llame al [phone].    We comply with applicable federal civil rights laws and [place] laws. We do not discriminate on the basis of race, color, national origin, age, disability, sex, sexual orientation, or gender identity.            Thank you!     Thank you for choosing [facility]  for your care. Our goal is always to provide you with excellent care. Hearing back from our patients is one way we can continue to improve our services. Please take a few minutes to complete the written survey that you may receive in the mail after your visit with us. Thank you!             Your Updated Medication List - Protect others around you: Learn how to safely use, store and throw away your medicines at www.disposemymeds.org.          This list is accurate as of 4/26/18 12:25 PM.  Always use your most recent med list.                   Brand Name Dispense Instructions for use Diagnosis    * Hylan 16 MG/2ML Sosy     1 Syringe    1 Syringe by INTRA-ARTICULAR route once a week    Post-traumatic osteoarthritis of left knee       * Hylan 16 MG/2ML Sosy     1 Syringe    1 Syringe by INTRA-ARTICULAR route once for 1 dose    Osteoarthritis       MULTIVITAMIN ADULT PO           OMEPRAZOLE PO           * Notice:  This list has 2 medication(s) that are the same as other medications prescribed for you. Read the directions carefully, and ask your doctor or other care provider to review them with you.

## 2018-05-09 ENCOUNTER — COMMUNICATION - HEALTHEAST (OUTPATIENT)
Dept: CARDIOLOGY | Facility: CLINIC | Age: 48
End: 2018-05-09

## 2018-05-09 DIAGNOSIS — I10 ESSENTIAL HYPERTENSION WITH GOAL BLOOD PRESSURE LESS THAN 130/85: ICD-10-CM

## 2018-05-21 ENCOUNTER — OFFICE VISIT (OUTPATIENT)
Dept: ORTHOPEDICS | Facility: CLINIC | Age: 48
End: 2018-05-21
Payer: COMMERCIAL

## 2018-05-21 VITALS — BODY MASS INDEX: 34.14 KG/M2 | WEIGHT: 266 LBS | HEIGHT: 74 IN

## 2018-05-21 DIAGNOSIS — G89.29 CHRONIC PAIN OF RIGHT KNEE: Primary | ICD-10-CM

## 2018-05-21 DIAGNOSIS — M17.31 POST-TRAUMATIC OSTEOARTHRITIS OF RIGHT KNEE: ICD-10-CM

## 2018-05-21 DIAGNOSIS — M25.561 CHRONIC PAIN OF RIGHT KNEE: Primary | ICD-10-CM

## 2018-05-21 RX ORDER — TRIAMTERENE AND HYDROCHLOROTHIAZIDE 75; 50 MG/1; MG/1
1 TABLET ORAL DAILY
COMMUNITY
End: 2021-09-01

## 2018-05-21 ASSESSMENT — ENCOUNTER SYMPTOMS
MYALGIAS: 1
EXERCISE INTOLERANCE: 0
BACK PAIN: 1
ALTERED TEMPERATURE REGULATION: 0
WEIGHT GAIN: 0
MUSCLE CRAMPS: 1
CHILLS: 0
SYNCOPE: 0
LIGHT-HEADEDNESS: 0
ORTHOPNEA: 0
HALLUCINATIONS: 0
ARTHRALGIAS: 1
POLYPHAGIA: 0
NECK PAIN: 1
WEIGHT LOSS: 0
HYPOTENSION: 0
SLEEP DISTURBANCES DUE TO BREATHING: 0
JOINT SWELLING: 1
PALPITATIONS: 0
POLYDIPSIA: 0
LEG PAIN: 0
INCREASED ENERGY: 1
FATIGUE: 1
FEVER: 0
HYPERTENSION: 1
STIFFNESS: 1
MUSCLE WEAKNESS: 1
NIGHT SWEATS: 0
DECREASED APPETITE: 0

## 2018-05-21 NOTE — PROGRESS NOTES
"CC: right knee pain    HPI:  Aydin is an established patient who is seen for continued post traumatic right knee pain. He has an extensive history of undergoing a lengthening over nail of his right femur 12 years ago at [facility]. He has managed his post traumatic knee arthritis symptoms with judicious use of nsaids, injections, icing, and therapy. He has also tried various types of braces both custom and over the counter. He rates his knee pain a \"consistent 3 or 4\". Has activity related night pain with consistent swelling. Denies instability \"just hurts\". Localizes the pain as \"mostly anterior but at times radiates medial\". Has pain with sit to stand and stairs. We finished a series of supartz 4/26/2018 which he reports \"helped only about 30-40%\".     PMH: Reviewed from office note on 4/12/2018    ROS: Reviewed from tablet today 5/21/2018 and is negative except for fatigue and skeletal symptms and joint pain.    FH/SH: Wife, Yojana, here with examination, nonsmoker, unemployed, minimal alcohol consumption    Meds: Reviewed from todays chart    All: Reviewed to include nickel    PE:  Pleasant and cooperative male alert and oriented x3. Walks with a slight limp favoring right knee with the inability to fully extend on walk through phase of gait. Arises from chair unguarded. Slight limp to gait noted. Right knee reveals - degrees of motion. Strength is symmetrical at 5/5. Incisions are all well healed/intact. No erythema and nontender to palpate surrounding tissue. Medial and lateral joint space has tenderness to palpate. Patella has significant crepitus that is not painful with near normal alignment without subluxation. No calf pain. Lachmans exam has an endpoint but soft in nature. Mild pseudolaxity in valgus/varus in 0, 45, and 90 degrees. Mild joint effusion. No palpable bakers cyst. Alignment is near neutral with slight varus. + CMS    Xrays reviewed from 4/12/2018   Impression:  1. Right " mechanical axis angle measures 0 degrees and left measures 1  degree.  2. Weight bearing axis as noted above.  3. Osteoarthritis of knee most predominantly medial and patellofemoral  compartments of the right knee. Old fracture deformity of the distal  right femur.   4. Right lower extremity measures approximately 92.9 cm, left lower  extremity 95.2 cm.    Dx:  1. Post traumatic right knee osteoarthritis    Plan:  1. The natural progression and plan of care was discussed at length including continued nonsurgical treatments of bracing, therapy, stretching, nsaids, injections, and activity modification. At this time, he feels hes exhausted these options. Dr. Chang is recommending a R total knee replacement. Since patient did have time with an external fixator the risk of infection does increase slightly so will obtain a bone scan preop for review. Risks of total knee replacements were discussed including blood clots, infection, nerve palsy, stiffness, and unexplained pain. Informed consent obtained. When he is ready, he will call and schedule.    Total time spent was 30 minutes with >50% spent in face to face consultation and collaboration of care with preop teaching.     Answers for HPI/ROS submitted by the patient on 5/21/2018   General Symptoms: Yes  Skin Symptoms: No  HENT Symptoms: No  EYE SYMPTOMS: No  HEART SYMPTOMS: Yes  LUNG SYMPTOMS: No  INTESTINAL SYMPTOMS: No  URINARY SYMPTOMS: No  REPRODUCTIVE SYMPTOMS: No  SKELETAL SYMPTOMS: Yes  BLOOD SYMPTOMS: No  NERVOUS SYSTEM SYMPTOMS: No  MENTAL HEALTH SYMPTOMS: No  Fever: No  Loss of appetite: No  Weight loss: No  Weight gain: No  Fatigue: Yes  Night sweats: No  Chills: No  Increased stress: No  Excessive hunger: No  Excessive thirst: No  Feeling hot or cold when others believe the temperature is normal: No  Loss of height: No  Post-operative complications: No  Surgical site pain: No  Hallucinations: No  Change in or Loss of Energy: Yes  Hyperactivity:  No  Confusion: No  Chest pain or pressure: No  Fast or irregular heartbeat: No  Pain in legs with walking: No  Trouble breathing while lying down: No  Fingers or toes appear blue: No  High blood pressure: Yes  Low blood pressure: No  Fainting: No  Murmurs: No  Pacemaker: No  Varicose veins: No  Edema or swelling: No  Wake up at night with shortness of breath: No  Light-headedness: No  Exercise intolerance: No  Back pain: Yes  Muscle aches: Yes  Neck pain: Yes  Swollen joints: Yes  Joint pain: Yes  Bone pain: No  Muscle cramps: Yes  Muscle weakness: Yes  Joint stiffness: Yes  Bone fracture: No    I, Dr. Tyrese Chang, have seen and examined this patient with GENEVIEVE Rico, CNP.

## 2018-05-21 NOTE — NURSING NOTE
Teaching Flowsheet   Relevant Diagnosis: osteoarthritis   Teaching Topic: Right total knee arthroplasty      Person(s) involved in teaching:   Patient and Wife     Motivation Level:  Asks Questions: Yes  Eager to Learn: No  Cooperative: Yes  Receptive (willing/able to accept information): Yes, but would like a call once surgery is schedule to review pre-op teaching over the phone.   Any cultural factors/Mosque beliefs that may influence understanding or compliance? No       Patient demonstrates understanding of the following:  Reason for the appointment, diagnosis and treatment plan: Yes  Knowledge of proper use of medications and conditions for which they are ordered (with special attention to potential side effects or drug interactions): Yes  Which situations necessitate calling provider and whom to contact: Yes       Teaching Concerns Addressed:   Comments: Patient having bone scan done today. He will be admitted following surgery. Negative significant medical history.      Proper use and care of CPM (medical equip, care aids, etc.): Yes  Nutritional needs and diet plan: Yes  Pain management techniques: Yes  Wound Care: Yes  How and/when to access community resources: NA     Instructional Materials Used/Given: pre-op packet, antiseptic soap, antibiotic reminder card, blood bank form.       Time spent with patient: 10 minutes.

## 2018-05-21 NOTE — MR AVS SNAPSHOT
After Visit Summary   5/21/2018    Aydin Aldridge    MRN: [id number]           Patient Information     Date Of Birth          1970        Visit Information        Provider Department      5/21/2018 8:30 AM Tyrese Chang MD [facility]        Today's Diagnoses     Chronic pain of right knee    -  1    Post-traumatic osteoarthritis of right knee           Follow-ups after your visit        Your next 10 appointments already scheduled     May 29, 2018  9:00 AM CDT   NM INJECT 3PH BONE SCAN with [address], [place], Nuclear Medicine ([facility])    [address] [place] [id number] [phone]            May 29, 2018 10:00 AM CDT   LAB with [facility] ([facility])    [address] [id number] [phone]           Please do not eat 10-12 hours before your appointment if you are coming in fasting for labs on lipids, cholesterol, or glucose (sugar). This does not apply to pregnant women. Water, hot tea and black coffee (with nothing added) are okay. Do not drink other fluids, diet soda or chew gum.            May 29, 2018 12:00 PM CDT   NM BONE SCAN 3 PHASE with [address], [place], Nuclear Medicine ([facility])    [address] [place] [address] [phone]           Please bring a list of your medicines to the exam. (Include vitamins, minerals and over-the-counter drugs.) You should wear comfortable clothes. Leave your valuables at home. Please bring related prior results and films.  Tell your doctor:   If you are breastfeeding or may be pregnant.   If you have had a barium test within the past 48 hours. Barium may change the results of certain exams.   If you think you may need sedation (medicine to help you relax).  You may eat and drink as normal.  Please call your Imaging  "Department at your exam site with any questions.              Future tests that were ordered for you today     Open Future Orders        Priority Expected Expires Ordered    CBC with platelets Routine  6/20/2018 5/21/2018    Erythrocyte sedimentation rate auto Routine  6/20/2018 5/21/2018    CRP inflammation Routine  6/20/2018 5/21/2018    NM Bone scan 3 phase Routine  5/21/2019 5/21/2018            Who to contact     Please call your clinic at [phone] to:    Ask questions about your health    Make or cancel appointments    Discuss your medicines    Learn about your test results    Speak to your doctor            Additional Information About Your Visit        [employer] Information     [employer] gives you secure access to your electronic health record. If you see a primary care provider, you can also send messages to your care team and make appointments. If you have questions, please call your primary care clinic.  If you do not have a primary care provider, please call [phone] and they will assist you.      [employer] is an electronic gateway that provides easy, online access to your medical records. With [employer], you can request a clinic appointment, read your test results, renew a prescription or communicate with your care team.     To access your existing account, please contact your [facility] or call [phone] for assistance.        Care EveryWhere ID     This is your Care EveryWhere ID. This could be used by other organizations to access your [facility] medical records  [id number]        Your Vitals Were     Height BMI (Body Mass Index)                1.89 m (6' 2.41\") 33.78 kg/m2           Blood Pressure from Last 3 Encounters:   05/07/04 140/76   04/12/04 138/82   08/18/03 132/90    Weight from Last 3 Encounters:   05/21/18 120.7 kg (266 lb)   05/07/04 97.1 kg (214 lb)   04/12/04 96.6 kg (213 lb)              We Performed the Following     Berna-Operative Worksheet " (Default Surgery Request)          Today's Medication Changes          These changes are accurate as of 5/21/18  3:00 PM.  If you have any questions, ask your nurse or doctor.               Stop taking these medicines if you haven't already. Please contact your care team if you have questions.     Hylan 16 MG/2ML Sosy           OMEPRAZOLE PO                    Primary Care Provider Office Phone # Fax #    GENEVIEVE Blanca [phone] [address] [address]        Equal Access to Services     MONICA NICHOLAS : Hadii aad ku hadasho Soomaali, waaxda luqadaha, qaybta kaalmada adeegyada, waxay idiin hayaan adeeg khcesilia jones . So [facility] [phone].    ATENCIÓN: Si habla español, tiene a flowers disposición servicios gratuitos de asistencia lingüística. Ll[facility] [phone].    We comply with applicable federal civil rights laws and [place] laws. We do not discriminate on the basis of race, color, national origin, age, disability, sex, sexual orientation, or gender identity.            Thank you!     Thank you for choosing [facility]  for your care. Our goal is always to provide you with excellent care. Hearing back from our patients is one way we can continue to improve our services. Please take a few minutes to complete the written survey that you may receive in the mail after your visit with us. Thank you!             Your Updated Medication List - Protect others around you: Learn how to safely use, store and throw away your medicines at www.disposemymeds.org.          This list is accurate as of 5/21/18  3:00 PM.  Always use your most recent med list.                   Brand Name Dispense Instructions for use Diagnosis    FAMOTIDINE PO           LOSARTAN POTASSIUM PO           MULTIVITAMIN ADULT PO           triamterene-hydrochlorothiazide 75-50 MG per tablet    MAXZIDE     Take 1 tablet by mouth daily

## 2018-05-21 NOTE — LETTER
"5/21/2018       RE: Aydin Aldridge  [address]  [facility] [address]     Dear Colleague,    Thank you for referring your patient, Aydin Aldridge, to the [facility] at [facility]. Please see a copy of my visit note below.    CC: right knee pain    HPI:  Aydin is an established patient who is seen for continued post traumatic right knee pain. He has an extensive history of undergoing a lengthening over nail of his right femur 12 years ago at [facility] orthopedics. He has managed his post traumatic knee arthritis symptoms with judicious use of nsaids, injections, icing, and therapy. He has also tried various types of braces both custom and over the counter. He rates his knee pain a \"consistent 3 or 4\". Has activity related night pain with consistent swelling. Denies instability \"just hurts\". Localizes the pain as \"mostly anterior but at times radiates medial\". Has pain with sit to stand and stairs. We finished a series of supartz 4/26/2018 which he reports \"helped only about 30-40%\".     PMH: Reviewed from office note on 4/12/2018    ROS: Reviewed from tablet today 5/21/2018 and is negative except for fatigue and skeletal symptms and joint pain.    FH/SH: Wife, Yojana, here with examination, nonsmoker, unemployed, minimal alcohol consumption    Meds: Reviewed from todays chart    All: Reviewed to include nickel    PE:  Pleasant and cooperative male alert and oriented x3. Walks with a slight limp favoring right knee with the inability to fully extend on walk through phase of gait. Arises from chair unguarded. Slight limp to gait noted. Right knee reveals - degrees of motion. Strength is symmetrical at 5/5. Incisions are all well healed/intact. No erythema and nontender to palpate surrounding tissue. Medial and lateral joint space has tenderness to palpate. Patella has significant crepitus that is not painful with near normal alignment without " subluxation. No calf pain. Lachmans exam has an endpoint but soft in nature. Mild pseudolaxity in valgus/varus in 0, 45, and 90 degrees. Mild joint effusion. No palpable bakers cyst. Alignment is near neutral with slight varus. + CMS    Xrays reviewed from 4/12/2018   Impression:  1. Right mechanical axis angle measures 0 degrees and left measures 1  degree.  2. Weight bearing axis as noted above.  3. Osteoarthritis of knee most predominantly medial and patellofemoral  compartments of the right knee. Old fracture deformity of the distal  right femur.   4. Right lower extremity measures approximately 92.9 cm, left lower  extremity 95.2 cm.    Dx:  1. Post traumatic right knee osteoarthritis    Plan:  1. The natural progression and plan of care was discussed at length including continued nonsurgical treatments of bracing, therapy, stretching, nsaids, injections, and activity modification. At this time, he feels hes exhausted these options. Dr. Chang is recommending a R total knee replacement. Since patient did have time with an external fixator the risk of infection does increase slightly so will obtain a bone scan preop for review. Risks of total knee replacements were discussed including blood clots, infection, nerve palsy, stiffness, and unexplained pain. Informed consent obtained. When he is ready, he will call and schedule.    Total time spent was 30 minutes with >50% spent in face to face consultation and collaboration of care with preop teaching.       I, Dr. Tyrese Chang, have seen and examined this patient with GENEVIEVE Rico, CNP.      Again, thank you for allowing me to participate in the care of your patient.      Sincerely,    Tyrese Chang MD

## 2018-05-29 ENCOUNTER — HOSPITAL ENCOUNTER (OUTPATIENT)
Dept: NUCLEAR MEDICINE | Facility: CLINIC | Age: 48
Setting detail: NUCLEAR MEDICINE
End: 2018-05-29
Attending: ORTHOPAEDIC SURGERY
Payer: COMMERCIAL

## 2018-05-29 ENCOUNTER — HOSPITAL ENCOUNTER (OUTPATIENT)
Dept: NUCLEAR MEDICINE | Facility: CLINIC | Age: 48
Setting detail: NUCLEAR MEDICINE
Discharge: HOME | End: 2018-05-29
Attending: ORTHOPAEDIC SURGERY | Admitting: ORTHOPAEDIC SURGERY
Payer: COMMERCIAL

## 2018-05-29 DIAGNOSIS — M25.561 CHRONIC PAIN OF RIGHT KNEE: ICD-10-CM

## 2018-05-29 DIAGNOSIS — G89.29 CHRONIC PAIN OF RIGHT KNEE: ICD-10-CM

## 2018-05-29 LAB
CRP SERPL-MCNC: 6.4 MG/L (ref 0–8)
ERYTHROCYTE [DISTWIDTH] IN BLOOD BY AUTOMATED COUNT: 13.6 % (ref 10–15)
ERYTHROCYTE [SEDIMENTATION RATE] IN BLOOD BY WESTERGREN METHOD: 6 MM/H (ref 0–15)
HCT VFR BLD AUTO: 43.5 % (ref 40–53)
HGB BLD-MCNC: 15 G/DL (ref 13.3–17.7)
MCH RBC QN AUTO: 29.6 PG (ref 26.5–33)
MCHC RBC AUTO-ENTMCNC: 34.5 G/DL (ref 31.5–36.5)
MCV RBC AUTO: 86 FL (ref 78–100)
PLATELET # BLD AUTO: 274 10E9/L (ref 150–450)
RBC # BLD AUTO: 5.07 10E12/L (ref 4.4–5.9)
WBC # BLD AUTO: 7.8 10E9/L (ref 4–11)

## 2018-05-29 PROCEDURE — 78315 BONE IMAGING 3 PHASE: CPT

## 2018-05-29 PROCEDURE — 85027 COMPLETE CBC AUTOMATED: CPT | Performed by: ORTHOPAEDIC SURGERY

## 2018-05-29 PROCEDURE — 36415 COLL VENOUS BLD VENIPUNCTURE: CPT | Performed by: ORTHOPAEDIC SURGERY

## 2018-05-29 PROCEDURE — 34300033 ZZH RX 343: Performed by: ORTHOPAEDIC SURGERY

## 2018-05-29 PROCEDURE — 85652 RBC SED RATE AUTOMATED: CPT | Performed by: ORTHOPAEDIC SURGERY

## 2018-05-29 PROCEDURE — A9503 TC99M MEDRONATE: HCPCS | Performed by: ORTHOPAEDIC SURGERY

## 2018-05-29 PROCEDURE — 86140 C-REACTIVE PROTEIN: CPT | Performed by: ORTHOPAEDIC SURGERY

## 2018-05-29 RX ORDER — TC 99M MEDRONATE 20 MG/10ML
25 INJECTION, POWDER, LYOPHILIZED, FOR SOLUTION INTRAVENOUS ONCE
Status: COMPLETED | OUTPATIENT
Start: 2018-05-29 | End: 2018-05-29

## 2018-05-29 RX ADMIN — TC 99M MEDRONATE 21.8 MCI.: 20 INJECTION, POWDER, LYOPHILIZED, FOR SOLUTION INTRAVENOUS at 10:08

## 2018-06-11 ENCOUNTER — COMMUNICATION - HEALTHEAST (OUTPATIENT)
Dept: CARDIOLOGY | Facility: CLINIC | Age: 48
End: 2018-06-11

## 2018-06-11 DIAGNOSIS — I10 ESSENTIAL HYPERTENSION WITH GOAL BLOOD PRESSURE LESS THAN 130/85: ICD-10-CM

## 2018-06-18 ENCOUNTER — COMMUNICATION - HEALTHEAST (OUTPATIENT)
Dept: CARDIOLOGY | Facility: CLINIC | Age: 48
End: 2018-06-18

## 2018-06-18 DIAGNOSIS — I10 ESSENTIAL HYPERTENSION WITH GOAL BLOOD PRESSURE LESS THAN 130/85: ICD-10-CM

## 2018-06-20 ENCOUNTER — AMBULATORY - HEALTHEAST (OUTPATIENT)
Dept: CARDIOLOGY | Facility: CLINIC | Age: 48
End: 2018-06-20

## 2018-06-20 ENCOUNTER — RECORDS - HEALTHEAST (OUTPATIENT)
Dept: ADMINISTRATIVE | Facility: OTHER | Age: 48
End: 2018-06-20

## 2018-06-21 ENCOUNTER — RECORDS - HEALTHEAST (OUTPATIENT)
Dept: ADMINISTRATIVE | Facility: OTHER | Age: 48
End: 2018-06-21

## 2018-06-22 ENCOUNTER — OFFICE VISIT - HEALTHEAST (OUTPATIENT)
Dept: CARDIOLOGY | Facility: CLINIC | Age: 48
End: 2018-06-22

## 2018-06-22 DIAGNOSIS — I10 ESSENTIAL HYPERTENSION WITH GOAL BLOOD PRESSURE LESS THAN 130/85: ICD-10-CM

## 2018-06-22 DIAGNOSIS — I10 ESSENTIAL HYPERTENSION: ICD-10-CM

## 2018-06-22 LAB
ANION GAP SERPL CALCULATED.3IONS-SCNC: 14 MMOL/L (ref 5–18)
BUN SERPL-MCNC: 13 MG/DL (ref 8–22)
CALCIUM SERPL-MCNC: 10.1 MG/DL (ref 8.5–10.5)
CHLORIDE BLD-SCNC: 99 MMOL/L (ref 98–107)
CO2 SERPL-SCNC: 26 MMOL/L (ref 22–31)
CREAT SERPL-MCNC: 1.2 MG/DL (ref 0.7–1.3)
GFR SERPL CREATININE-BSD FRML MDRD: >60 ML/MIN/1.73M2
GLUCOSE BLD-MCNC: 98 MG/DL (ref 70–125)
POTASSIUM BLD-SCNC: 3.8 MMOL/L (ref 3.5–5)
SODIUM SERPL-SCNC: 139 MMOL/L (ref 136–145)

## 2018-06-22 ASSESSMENT — MIFFLIN-ST. JEOR: SCORE: 2105.03

## 2018-06-27 ENCOUNTER — HOSPITAL ENCOUNTER (OUTPATIENT)
Dept: ULTRASOUND IMAGING | Facility: HOSPITAL | Age: 48
Discharge: HOME | End: 2018-06-27
Attending: SURGERY

## 2018-06-27 DIAGNOSIS — E04.1 RIGHT THYROID NODULE: ICD-10-CM

## 2018-07-17 ENCOUNTER — COMMUNICATION - HEALTHEAST (OUTPATIENT)
Dept: CARDIOLOGY | Facility: CLINIC | Age: 48
End: 2018-07-17

## 2018-07-17 DIAGNOSIS — I10 ESSENTIAL HYPERTENSION WITH GOAL BLOOD PRESSURE LESS THAN 130/85: ICD-10-CM

## 2018-09-06 ENCOUNTER — OFFICE VISIT - HEALTHEAST (OUTPATIENT)
Dept: FAMILY MEDICINE | Facility: CLINIC | Age: 48
End: 2018-09-06

## 2018-09-06 DIAGNOSIS — R05.9 COUGH: ICD-10-CM

## 2018-09-06 DIAGNOSIS — H66.002 ACUTE SUPPURATIVE OTITIS MEDIA OF LEFT EAR WITHOUT SPONTANEOUS RUPTURE OF TYMPANIC MEMBRANE, RECURRENCE NOT SPECIFIED: ICD-10-CM

## 2018-11-20 ENCOUNTER — COMMUNICATION - HEALTHEAST (OUTPATIENT)
Dept: TELEHEALTH | Facility: CLINIC | Age: 48
End: 2018-11-20

## 2018-11-20 ENCOUNTER — OFFICE VISIT - HEALTHEAST (OUTPATIENT)
Dept: FAMILY MEDICINE | Facility: CLINIC | Age: 48
End: 2018-11-20

## 2018-11-20 DIAGNOSIS — H65.93 OME (OTITIS MEDIA WITH EFFUSION), BILATERAL: ICD-10-CM

## 2018-11-20 DIAGNOSIS — J32.9 CHRONIC SINUSITIS, UNSPECIFIED LOCATION: ICD-10-CM

## 2018-11-20 DIAGNOSIS — J30.9 ALLERGIC RHINITIS, UNSPECIFIED SEASONALITY, UNSPECIFIED TRIGGER: ICD-10-CM

## 2018-12-27 ENCOUNTER — OFFICE VISIT - HEALTHEAST (OUTPATIENT)
Dept: FAMILY MEDICINE | Facility: CLINIC | Age: 48
End: 2018-12-27

## 2018-12-27 DIAGNOSIS — R05.9 COUGH: ICD-10-CM

## 2018-12-27 DIAGNOSIS — J02.9 ACUTE PHARYNGITIS, UNSPECIFIED ETIOLOGY: ICD-10-CM

## 2018-12-27 LAB — DEPRECATED S PYO AG THROAT QL EIA: NORMAL

## 2018-12-28 LAB — GROUP A STREP BY PCR: NORMAL

## 2019-01-11 ENCOUNTER — COMMUNICATION - HEALTHEAST (OUTPATIENT)
Dept: ADMINISTRATIVE | Facility: CLINIC | Age: 49
End: 2019-01-11

## 2019-01-11 ENCOUNTER — OFFICE VISIT - HEALTHEAST (OUTPATIENT)
Dept: FAMILY MEDICINE | Facility: CLINIC | Age: 49
End: 2019-01-11

## 2019-01-11 DIAGNOSIS — K64.4 EXTERNAL HEMORRHOIDS: ICD-10-CM

## 2019-01-11 DIAGNOSIS — E89.0 H/O PARTIAL THYROIDECTOMY: ICD-10-CM

## 2019-01-11 DIAGNOSIS — R05.9 COUGH: ICD-10-CM

## 2019-01-11 DIAGNOSIS — I10 ESSENTIAL HYPERTENSION: ICD-10-CM

## 2019-01-11 DIAGNOSIS — K21.9 CHRONIC GERD: ICD-10-CM

## 2019-01-11 LAB
ANION GAP SERPL CALCULATED.3IONS-SCNC: 11 MMOL/L (ref 5–18)
BUN SERPL-MCNC: 15 MG/DL (ref 8–22)
CALCIUM SERPL-MCNC: 10.6 MG/DL (ref 8.5–10.5)
CHLORIDE BLD-SCNC: 98 MMOL/L (ref 98–107)
CO2 SERPL-SCNC: 31 MMOL/L (ref 22–31)
CREAT SERPL-MCNC: 1.29 MG/DL (ref 0.7–1.3)
GFR SERPL CREATININE-BSD FRML MDRD: 60 ML/MIN/1.73M2
GLUCOSE BLD-MCNC: 102 MG/DL (ref 70–125)
POTASSIUM BLD-SCNC: 3.7 MMOL/L (ref 3.5–5)
SODIUM SERPL-SCNC: 140 MMOL/L (ref 136–145)

## 2019-01-14 ENCOUNTER — COMMUNICATION - HEALTHEAST (OUTPATIENT)
Dept: FAMILY MEDICINE | Facility: CLINIC | Age: 49
End: 2019-01-14

## 2019-01-17 ENCOUNTER — RECORDS - HEALTHEAST (OUTPATIENT)
Dept: ADMINISTRATIVE | Facility: OTHER | Age: 49
End: 2019-01-17

## 2019-02-06 ENCOUNTER — RECORDS - HEALTHEAST (OUTPATIENT)
Dept: ADMINISTRATIVE | Facility: OTHER | Age: 49
End: 2019-02-06

## 2019-02-08 ENCOUNTER — RECORDS - HEALTHEAST (OUTPATIENT)
Dept: ADMINISTRATIVE | Facility: OTHER | Age: 49
End: 2019-02-08

## 2019-02-14 ENCOUNTER — COMMUNICATION - HEALTHEAST (OUTPATIENT)
Dept: FAMILY MEDICINE | Facility: CLINIC | Age: 49
End: 2019-02-14

## 2019-02-14 DIAGNOSIS — R05.9 COUGH: ICD-10-CM

## 2019-02-21 ENCOUNTER — AMBULATORY - HEALTHEAST (OUTPATIENT)
Dept: PULMONOLOGY | Facility: OTHER | Age: 49
End: 2019-02-21

## 2019-02-21 ENCOUNTER — OFFICE VISIT - HEALTHEAST (OUTPATIENT)
Dept: FAMILY MEDICINE | Facility: CLINIC | Age: 49
End: 2019-02-21

## 2019-02-21 ENCOUNTER — TELEPHONE (OUTPATIENT)
Dept: ORTHOPEDICS | Facility: CLINIC | Age: 49
End: 2019-02-21

## 2019-02-21 DIAGNOSIS — R05.9 COUGH: ICD-10-CM

## 2019-02-21 DIAGNOSIS — Z98.890 HISTORY OF ESOPHAGOGASTRODUODENOSCOPY (EGD): ICD-10-CM

## 2019-02-21 DIAGNOSIS — R05.3 CHRONIC COUGH: ICD-10-CM

## 2019-02-21 NOTE — TELEPHONE ENCOUNTER
[facility] Call Center    Phone Message    May a detailed message be left on voicemail: yes    Reason for Call: Other: Pt is requesting a right knee injection, pt is unsure if  would like to see him back for evaluation or if he would be able to get the injection scheduled.Please call pt back to discuss.     Action Taken: Message routed to:  [facility]o

## 2019-02-25 ENCOUNTER — COMMUNICATION - HEALTHEAST (OUTPATIENT)
Dept: ADMINISTRATIVE | Facility: CLINIC | Age: 49
End: 2019-02-25

## 2019-02-25 DIAGNOSIS — Z98.890 HISTORY OF ESOPHAGOGASTRODUODENOSCOPY (EGD): ICD-10-CM

## 2019-03-07 ENCOUNTER — OFFICE VISIT (OUTPATIENT)
Dept: ORTHOPEDICS | Facility: CLINIC | Age: 49
End: 2019-03-07
Payer: COMMERCIAL

## 2019-03-07 DIAGNOSIS — M17.31 POST-TRAUMATIC OSTEOARTHRITIS OF RIGHT KNEE: Primary | ICD-10-CM

## 2019-03-07 RX ORDER — HYALURONATE SODIUM 10 MG/ML
20 SYRINGE (ML) INTRAARTICULAR WEEKLY
Qty: 1 SYRINGE | Refills: 2 | OUTPATIENT
Start: 2019-03-07 | End: 2019-03-12

## 2019-03-07 RX ORDER — HYALURONATE SODIUM 10 MG/ML
20 SYRINGE (ML) INTRAARTICULAR WEEKLY
Status: DISCONTINUED | OUTPATIENT
Start: 2019-03-07 | End: 2019-03-07 | Stop reason: CLARIF

## 2019-03-07 RX ADMIN — LIDOCAINE HYDROCHLORIDE 2 ML: 10 INJECTION, SOLUTION EPIDURAL; INFILTRATION; INTRACAUDAL; PERINEURAL at 10:33

## 2019-03-07 RX ADMIN — TRIAMCINOLONE ACETONIDE 40 MG: 40 INJECTION, SUSPENSION INTRA-ARTICULAR; INTRAMUSCULAR at 10:33

## 2019-03-07 ASSESSMENT — ENCOUNTER SYMPTOMS
WHEEZING: 0
EYE PAIN: 0
SINUS PAIN: 0
LEG PAIN: 0
HEMOPTYSIS: 0
DYSPNEA ON EXERTION: 0
COUGH DISTURBING SLEEP: 0
TROUBLE SWALLOWING: 0
VOMITING: 0
SLEEP DISTURBANCES DUE TO BREATHING: 0
LIGHT-HEADEDNESS: 1
BACK PAIN: 1
JAUNDICE: 0
HEARTBURN: 1
NAUSEA: 0
BLOATING: 0
CONSTIPATION: 0
HYPERTENSION: 1
ARTHRALGIAS: 1
SORE THROAT: 0
COUGH: 1
MYALGIAS: 1
SPUTUM PRODUCTION: 0
EXERCISE INTOLERANCE: 0
HYPOTENSION: 0
SHORTNESS OF BREATH: 0
NECK MASS: 0
STIFFNESS: 1
EYE WATERING: 1
ABDOMINAL PAIN: 0
DIARRHEA: 0
TASTE DISTURBANCE: 0
SINUS CONGESTION: 1
ORTHOPNEA: 0
EYE IRRITATION: 1
HOARSE VOICE: 0
SNORES LOUDLY: 1
MUSCLE WEAKNESS: 0
EYE REDNESS: 0
SYNCOPE: 0
NECK PAIN: 1
PALPITATIONS: 0
BOWEL INCONTINENCE: 0
DOUBLE VISION: 0
POSTURAL DYSPNEA: 0
RECTAL PAIN: 0
BLOOD IN STOOL: 0
SMELL DISTURBANCE: 0

## 2019-03-07 NOTE — PROGRESS NOTES
[facility]  [address] [place] [address]  [phone]  Dept: [phone]  ______________________________________________________________________________    Patient: Aydin Aldridge   : 1970   MRN: [id number]   2019    INVASIVE PROCEDURE SAFETY CHECKLIST    Date: 3/7/2019   Procedure:Bilateral knee CSI  Patient Name: Aydin Aldridge  MRN: [id number]  YOB: 1970    Action: Complete sections as appropriate. Any discrepancy results in a HARD COPY until resolved.     PRE PROCEDURE:  Patient ID verified with 2 identifiers (name and  or MRN): Yes  Procedure and site verified with patient/designee (when able): Yes  Accurate consent documentation in medical record: Yes  H&P (or appropriate assessment) documented in medical record: Yes  H&P must be up to 20 days prior to procedure and updates within 24 hours of procedure as applicable: Yes  Relevant diagnostic and radiology test results appropriately labeled and displayed as applicable: Yes  Procedure site(s) marked with provider initials: Yes    TIMEOUT:  Time-Out performed immediately prior to starting procedure, including verbal and active participation of all team members addressing the following:Yes  * Correct patient identify  * Confirmed that the correct side and site are marked  * An accurate procedure consent form  * Agreement on the procedure to be done  * Correct patient position  * Relevant images and results are properly labeled and appropriately displayed  * The need to administer antibiotics or fluids for irrigation purposes during the procedure as applicable   * Safety precautions based on patient history or medication use    DURING PROCEDURE: Verification of correct person, site, and procedures any time the responsibility for care of the patient is transferred to another member of the care team.     Prior to injection, verified patient identity using patient's name and date of  birth.  Due to injection administration, patient instructed to remain in clinic for 15 minutes  afterwards, and to report any adverse reaction to me immediately.    Joint injection was performed.    Med: 1% Lidocaine  Drug Amount Wasted:  Yes: 1 mg/ml   Vial/Syringe: Single dose vial  Expiration Date:  9/1/22    Large Joint Injection/Arthocentesis: bilateral knee  Date/Time: 3/7/2019 10:33 AM  Performed by: Romina Hartman APRN CNP  Authorized by: Romina Hartman APRN CNP     Indications:  Osteoarthritis  Needle Size:  25 G  Guidance: landmark guided    Approach:  Lateral  Location:  Knee  Laterality:  Bilateral  Site:  Bilateral knee  Medications (Right):  40 mg triamcinolone 40 MG/ML; 2 mL lidocaine (PF) 1 %  Medications (Left):  40 mg triamcinolone 40 MG/ML; 2 mL lidocaine (PF) 1 %  Outcome:  Tolerated well, no immediate complications  Procedure discussed: discussed risks, benefits, and alternatives    Consent Given by:  Patient  Timeout: timeout called immediately prior to procedure    Prep: patient was prepped and draped in usual sterile fashion

## 2019-03-07 NOTE — LETTER
3/7/2019       RE: Aydin Aldridge  [address]  [place] [address]     Dear Colleague,    Thank you for referring your patient, Aydin Aldridge, to the [facility]. Please see a copy of my visit note below.    [facility]  [address] [facility]  [facility] [facility] [address]  [phone]  Dept: [phone]  ______________________________________________________________________________    Patient: Aydin Aldridge   : 1970   MRN: [id number]   2019    INVASIVE PROCEDURE SAFETY CHECKLIST    Date: 3/7/2019   Procedure:Bilateral knee CSI  Patient Name: Aydin Aldridge  MRN: [id number]  YOB: 1970    Action: Complete sections as appropriate. Any discrepancy results in a HARD COPY until resolved.     PRE PROCEDURE:  Patient ID verified with 2 identifiers (name and  or MRN): Yes  Procedure and site verified with patient/designee (when able): Yes  Accurate consent documentation in medical record: Yes  H&P (or appropriate assessment) documented in medical record: Yes  H&P must be up to 20 days prior to procedure and updates within 24 hours of procedure as applicable: Yes  Relevant diagnostic and radiology test results appropriately labeled and displayed as applicable: Yes  Procedure site(s) marked with provider initials: Yes    TIMEOUT:  Time-Out performed immediately prior to starting procedure, including verbal and active participation of all team members addressing the following:Yes  * Correct patient identify  * Confirmed that the correct side and site are marked  * An accurate procedure consent form  * Agreement on the procedure to be done  * Correct patient position  * Relevant images and results are properly labeled and appropriately displayed  * The need to administer antibiotics or fluids for irrigation purposes during the procedure as applicable   * Safety precautions based on  patient history or medication use    DURING PROCEDURE: Verification of correct person, site, and procedures any time the responsibility for care of the patient is transferred to another member of the care team.     Prior to injection, verified patient identity using patient's name and date of birth.  Due to injection administration, patient instructed to remain in clinic for 15 minutes  afterwards, and to report any adverse reaction to me immediately.    Joint injection was performed.    Med: 1% Lidocaine  Drug Amount Wasted:  Yes: 1 mg/ml   Vial/Syringe: Single dose vial  Expiration Date:  9/1/22    Large Joint Injection/Arthocentesis: bilateral knee  Date/Time: 3/7/2019 10:33 AM  Performed by: Romina Hartman APRN CNP  Authorized by: Romian Hartman APRN CNP     Indications:  Osteoarthritis  Needle Size:  25 G  Guidance: landmark guided    Approach:  Lateral  Location:  Knee  Laterality:  Bilateral  Site:  Bilateral knee  Medications (Right):  40 mg triamcinolone 40 MG/ML; 2 mL lidocaine (PF) 1 %  Medications (Left):  40 mg triamcinolone 40 MG/ML; 2 mL lidocaine (PF) 1 %  Outcome:  Tolerated well, no immediate complications  Procedure discussed: discussed risks, benefits, and alternatives    Consent Given by:  Patient  Timeout: timeout called immediately prior to procedure    Prep: patient was prepped and draped in usual sterile fashion      Again, thank you for allowing me to participate in the care of your patient.      Sincerely,    GENEVIEVE Sykes CNP

## 2019-03-08 ENCOUNTER — RECORDS - HEALTHEAST (OUTPATIENT)
Dept: ADMINISTRATIVE | Facility: OTHER | Age: 49
End: 2019-03-08

## 2019-03-11 RX ORDER — TRIAMCINOLONE ACETONIDE 40 MG/ML
40 INJECTION, SUSPENSION INTRA-ARTICULAR; INTRAMUSCULAR
Status: DISCONTINUED | OUTPATIENT
Start: 2019-03-07 | End: 2020-10-15

## 2019-03-11 RX ORDER — LIDOCAINE HYDROCHLORIDE 10 MG/ML
2 INJECTION, SOLUTION EPIDURAL; INFILTRATION; INTRACAUDAL; PERINEURAL
Status: DISCONTINUED | OUTPATIENT
Start: 2019-03-07 | End: 2020-10-15

## 2019-03-12 DIAGNOSIS — M17.31 POST-TRAUMATIC OSTEOARTHRITIS OF RIGHT KNEE: Primary | ICD-10-CM

## 2019-03-12 RX ORDER — HYALURONATE SODIUM 10 MG/ML
20 SYRINGE (ML) INTRAARTICULAR WEEKLY
Qty: 1 SYRINGE | Refills: 2 | OUTPATIENT
Start: 2019-03-12 | End: 2019-05-06

## 2019-03-14 NOTE — PROGRESS NOTES
Procedure Note    Pre Procedure dx: bilateral knee osteoarthritis    Post Procedure dx: bilateral knee osteoarthritis    Procedure: Informed consent was obtained. Using sterile technique, the anterior lateral aspect of both knees were prepped with chlorahexadine x2. 1cc of 40 mg of kenalog and 2 ml of lidocaine was injected into the lateral joint space of each knee.  Patient tolerated well without complications. Will follow up with next month for viscosupplement injection to his right knee.

## 2019-03-15 ENCOUNTER — OFFICE VISIT - HEALTHEAST (OUTPATIENT)
Dept: PULMONOLOGY | Facility: OTHER | Age: 49
End: 2019-03-15

## 2019-03-15 DIAGNOSIS — R05.9 COUGH: ICD-10-CM

## 2019-03-18 ENCOUNTER — TELEPHONE (OUTPATIENT)
Dept: ORTHOPEDICS | Facility: CLINIC | Age: 49
End: 2019-03-18

## 2019-03-18 NOTE — TELEPHONE ENCOUNTER
Received word that his insurance denied synvisc injections and is requiring a 4 week trial of physical therapy. I explained this is a waste of insurance dollars based on the amount of arthritis in his knee is too far advanced and his strength and ROM would not improve with a therapy program. He has great success with viscosupplements in the past and it has prolonged his need for a total knee replacement.    Will do a peer to peer review on 3/21/2019 in the hopes we can get approved.  In the meantime, we will wait on his scheduled appointments until we have confirmation.

## 2019-03-19 ENCOUNTER — COMMUNICATION - HEALTHEAST (OUTPATIENT)
Dept: FAMILY MEDICINE | Facility: CLINIC | Age: 49
End: 2019-03-19

## 2019-03-19 DIAGNOSIS — R05.9 COUGH: ICD-10-CM

## 2019-04-18 ENCOUNTER — TELEPHONE (OUTPATIENT)
Dept: ORTHOPEDICS | Facility: CLINIC | Age: 49
End: 2019-04-18

## 2019-04-18 ENCOUNTER — RECORDS - HEALTHEAST (OUTPATIENT)
Dept: ADMINISTRATIVE | Facility: OTHER | Age: 49
End: 2019-04-18

## 2019-04-18 ENCOUNTER — DOCUMENTATION ONLY (OUTPATIENT)
Dept: ORTHOPEDICS | Facility: CLINIC | Age: 49
End: 2019-04-18

## 2019-04-18 NOTE — TELEPHONE ENCOUNTER
[facility] Call Center    Phone Message    May a detailed message be left on voicemail: yes    Reason for Call: Other: Pt states he was in clinic today and had spoken to Tanvi regarding insurance coverage for the injections he was to be receiving. He states he contacted his insurance company, and to his understanding, they have not received all the information they need from the clinic regarding the pt. He requests the clinic reach out to [facility] at [phone]. Please advise.    Action Taken: Message routed to:  [facility]

## 2019-04-18 NOTE — PROGRESS NOTES
[facility] [phone] was phoned by RN for status of pt's prior authorization for synvisc injections into his painful right knee osteoarthritis.  The injection was denied, and we were asked to speak with the peer to peer group at [facility] [phone], transferred.....  After 45 minutes of holding, we got through with peer review and an appointment was sent for next Monday 4/22/19 at 2:30PM.  Tanvi Neil RN

## 2019-04-22 NOTE — TELEPHONE ENCOUNTER
4/22/19, Dr Chang had peer to peer review with insurance Dr Sherita Najera.  Visco supplements have been approved per their conversation today.  Pt has been scheduled starting next week for 3 weekly injections.  Tanvi Neil RN

## 2019-04-26 ENCOUNTER — OFFICE VISIT - HEALTHEAST (OUTPATIENT)
Dept: FAMILY MEDICINE | Facility: CLINIC | Age: 49
End: 2019-04-26

## 2019-04-26 DIAGNOSIS — Z98.890 HISTORY OF ESOPHAGOGASTRODUODENOSCOPY (EGD): ICD-10-CM

## 2019-04-26 DIAGNOSIS — Z01.818 PRE-OP EVALUATION: ICD-10-CM

## 2019-04-26 DIAGNOSIS — I10 ESSENTIAL HYPERTENSION: ICD-10-CM

## 2019-04-26 DIAGNOSIS — K64.4 EXTERNAL HEMORRHOIDS: ICD-10-CM

## 2019-04-26 LAB
ANION GAP SERPL CALCULATED.3IONS-SCNC: 13 MMOL/L (ref 5–18)
BUN SERPL-MCNC: 13 MG/DL (ref 8–22)
CALCIUM SERPL-MCNC: 10.1 MG/DL (ref 8.5–10.5)
CHLORIDE BLD-SCNC: 101 MMOL/L (ref 98–107)
CO2 SERPL-SCNC: 25 MMOL/L (ref 22–31)
CREAT SERPL-MCNC: 1.12 MG/DL (ref 0.7–1.3)
GFR SERPL CREATININE-BSD FRML MDRD: >60 ML/MIN/1.73M2
GLUCOSE BLD-MCNC: 125 MG/DL (ref 70–125)
POTASSIUM BLD-SCNC: 3.3 MMOL/L (ref 3.5–5)
SODIUM SERPL-SCNC: 139 MMOL/L (ref 136–145)

## 2019-05-02 ENCOUNTER — OFFICE VISIT (OUTPATIENT)
Dept: ORTHOPEDICS | Facility: CLINIC | Age: 49
End: 2019-05-02
Payer: COMMERCIAL

## 2019-05-02 DIAGNOSIS — M17.31 POST-TRAUMATIC OSTEOARTHRITIS OF RIGHT KNEE: Primary | ICD-10-CM

## 2019-05-02 NOTE — NURSING NOTE
[facility]  [address] [place] [address]  [phone]  Dept: [phone]  ______________________________________________________________________________    Patient: Aydin Aldridge   : 1970   MRN: [id number]   May 2, 2019    INVASIVE PROCEDURE SAFETY CHECKLIST    Date: 2019   Procedure:Right knee Synvisc injection  Patient Name: Aydin Aldridge  MRN: [id number]  YOB: 1970    Action: Complete sections as appropriate. Any discrepancy results in a HARD COPY until resolved.     PRE PROCEDURE:  Patient ID verified with 2 identifiers (name and  or MRN): Yes  Procedure and site verified with patient/designee (when able): Yes  Accurate consent documentation in medical record: Yes  H&P (or appropriate assessment) documented in medical record: NA  H&P must be up to 20 days prior to procedure and updates within 24 hours of procedure as applicable: NA  Relevant diagnostic and radiology test results appropriately labeled and displayed as applicable: Yes  Procedure site(s) marked with provider initials: NA    TIMEOUT:  Time-Out performed immediately prior to starting procedure, including verbal and active participation of all team members addressing the following:Yes  * Correct patient identify  * Confirmed that the correct side and site are marked  * An accurate procedure consent form  * Agreement on the procedure to be done  * Correct patient position  * Relevant images and results are properly labeled and appropriately displayed  * The need to administer antibiotics or fluids for irrigation purposes during the procedure as applicable   * Safety precautions based on patient history or medication use    DURING PROCEDURE: Verification of correct person, site, and procedures any time the responsibility for care of the patient is transferred to another member of the care team.       The following medications were given:         Prior to injection,  verified patient identity using patient's name and date of birth.  Due to injection administration, patient instructed to remain in clinic for 15 minutes  afterwards, and to report any adverse reaction to me immediately.    Joint injection was performed.    Medication Name: Synvisc Hylan G-F 20 NDC - [id number]  Drug Amount Wasted:  None.  Vial/Syringe: Syringe  Expiration Date:  07/2021  Lot: [id number]   - [employer]       Marni Cook ATC

## 2019-05-02 NOTE — PROGRESS NOTES
"HISTORY OF PRESENT ILLNESS:  Aydin is a previous patient of Dr. 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  He subsequently in 2014, he had hardware removal with a quadriceps plasty.  We had seen him intermittently for intermittent advanced grade IV posttraumatic osteoarthritis about his right knee.  He reports today with a chief complaint of right knee pain.  He reports that the pain is daily described as a deep ache, rating an average of 7-8  He did finish a series of Supartz about 3 years ago but had a subsequent fall after and is not sure if the \"Supartz could not work effectively\". However, over the past 8 years viscosupplements has worked quite efficiently in managing his knee pain in the hopes of prolonging his total knee replacement. He does report pain with extensive walking with a limp, has a daily pain and at times describes instability. Cortisone does help his knee pain but \"for only a few months\".  Does have night pain and does not use a cane but \"probably should because of how much he has to limp\".  He takes occasional Advil.  Has occasional edema, depending on his activity level.  He does have weakness with stair descension.  At times his knee does \"buckle\" which at that time, his knee pain is an 8 or 9 for a day or 2. That he uses ice and activity modification. Aydin is requesting repeat series of viscosupplements which I think is reasonably.    Xrays were reviewed confirming post traumatic advanced grade IV osteoarthritis to his right knee.     Dx:  1. Right knee advanced grade IV bone on bone osteoarthritis    Plan: Will proceed with injections.      Procedure Note    Pre Procedure dx: right knee OA    Post Procedure dx: same    Procedure: Informed consent was obtained. Using sterile technique, the anterior lateral aspect of the right knee was prepped with chlorahexadine x2. Synvisc was injected " into the anterior lateral joint space without difficulty. Tolerated well.  Follow up in 1 week for 2nd injection.

## 2019-05-02 NOTE — LETTER
5/2/2019       RE: Aydin Aldridge  [address]  [facility] [address]     Dear Colleague,    Thank you for referring your patient, Aydin Aldridge, to [facility]. Please see a copy of my visit note below.    Procedure Note    Pre Procedure dx: right knee OA    Post Procedure dx: same    Procedure: Informed consent was obtained. Using sterile technique, the anterior lateral aspect of the right knee was prepped with chlorahexadine x2. Synvisc was injected into the anterior lateral joint space without difficulty. Tolerated well.  Follow up in 1 week for 2nd injection.    Again, thank you for allowing me to participate in the care of your patient.      Sincerely,    GENEVIEVE Sykes CNP

## 2019-05-02 NOTE — NURSING NOTE
Reason For Visit:   Chief Complaint   Patient presents with     RECHECK     Right knee synvisc injection. 1 out of 3       There were no vitals taken for this visit.    Pain Assessment  Patient Currently in Pain: Yes  0-10 Pain Scale: 4  Primary Pain Location: Knee  Pain Descriptors: Carly Germain CMA

## 2019-05-06 DIAGNOSIS — M17.31 POST-TRAUMATIC OSTEOARTHRITIS OF RIGHT KNEE: ICD-10-CM

## 2019-05-06 RX ORDER — HYALURONATE SODIUM 10 MG/ML
20 SYRINGE (ML) INTRAARTICULAR WEEKLY
Qty: 1 SYRINGE | Refills: 2 | OUTPATIENT
Start: 2019-05-06 | End: 2019-05-06

## 2019-05-06 RX ORDER — HYALURONATE SODIUM 10 MG/ML
20 SYRINGE (ML) INTRAARTICULAR WEEKLY
Qty: 1 SYRINGE | Refills: 2 | Status: SHIPPED | OUTPATIENT
Start: 2019-05-06 | End: 2019-05-09

## 2019-05-08 ENCOUNTER — HOSPITAL ENCOUNTER (OUTPATIENT)
Dept: RADIOLOGY | Facility: HOSPITAL | Age: 49
Discharge: HOME | End: 2019-05-08
Attending: INTERNAL MEDICINE

## 2019-05-08 DIAGNOSIS — R05.9 COUGH: ICD-10-CM

## 2019-05-09 ENCOUNTER — OFFICE VISIT (OUTPATIENT)
Dept: ORTHOPEDICS | Facility: CLINIC | Age: 49
End: 2019-05-09
Payer: COMMERCIAL

## 2019-05-09 DIAGNOSIS — M17.31 POST-TRAUMATIC OSTEOARTHRITIS OF RIGHT KNEE: ICD-10-CM

## 2019-05-09 NOTE — LETTER
5/9/2019       RE: Aydin Aldridge  [address]  [facility] [address]     Dear Colleague,    Thank you for referring your patient, Aydin Aldridge, to the [facility] at [facility]. Please see a copy of my visit note below.    Procedure Note    Preprocedure diagnosis: Right knee posttraumatic osteoarthritis    Preprocedure diagnosis: Same    Procedure    Informed consent was obtained. Using sterile technique, the anterior medial aspect of the right knee was prepped with chlorhexidine ×2. Synvisc was injected into the medial joint space without difficulty. Tolerated the procedure well. We'll follow up in 1 week  For his third injection of Synvisc into his knee.    Again, thank you for allowing me to participate in the care of your patient.      Sincerely,    GENEVIEVE Sykes CNP

## 2019-05-09 NOTE — PROGRESS NOTES
Procedure Note    Preprocedure diagnosis: Right knee posttraumatic osteoarthritis    Preprocedure diagnosis: Same    Procedure    Informed consent was obtained. Using sterile technique, the anterior medial aspect of the right knee was prepped with chlorhexidine ×2. Synvisc was injected into the medial joint space without difficulty. Tolerated the procedure well. We'll follow up in 1 week  For his third injection of Synvisc into his knee.

## 2019-05-10 ENCOUNTER — OFFICE VISIT - HEALTHEAST (OUTPATIENT)
Dept: PULMONOLOGY | Facility: OTHER | Age: 49
End: 2019-05-10

## 2019-05-10 DIAGNOSIS — R05.9 COUGH: ICD-10-CM

## 2019-05-13 DIAGNOSIS — M17.31 POST-TRAUMATIC OSTEOARTHRITIS OF RIGHT KNEE: ICD-10-CM

## 2019-05-16 ENCOUNTER — OFFICE VISIT (OUTPATIENT)
Dept: ORTHOPEDICS | Facility: CLINIC | Age: 49
End: 2019-05-16
Payer: COMMERCIAL

## 2019-05-16 DIAGNOSIS — M17.11 PRIMARY OSTEOARTHRITIS OF RIGHT KNEE: Primary | ICD-10-CM

## 2019-05-16 NOTE — LETTER
2019       RE: Aydin Aldridge  [address]  [place] [address]     Dear Colleague,    Thank you for referring your patient, Aydin Aldridge, to the [facility] at [facility]. Please see a copy of my visit note below.    Large Joint Injection/Arthocentesis: R knee joint  Date/Time: 2019 8:06 AM  Performed by: Romina Hartman APRN CNP  Authorized by: Romina Hartman APRN CNP     Indications:  Osteoarthritis and pain  Needle Size:  25 G  Guidance: landmark guided    Approach:  Lateral  Location:  Knee    Medications:  16 mg hylan 16 MG/2ML  Outcome:  Tolerated well, no immediate complications  Procedure discussed: discussed risks, benefits, and alternatives    Consent Given by:  Patient  Timeout: timeout called immediately prior to procedure    Prep: patient was prepped and draped in usual sterile fashion        [facility]  [address] [place] [address]  [phone]  Dept: [phone]  ______________________________________________________________________________    Patient: Aydin Aldridge   : 1970   MRN: [id number]   May 16, 2019    INVASIVE PROCEDURE SAFETY CHECKLIST    Date: 2019   Procedure:Right knee synvisc injection  Patient Name: Aydin Aldridge  MRN: [id number]  YOB: 1970    Action: Complete sections as appropriate. Any discrepancy results in a HARD COPY until resolved.     PRE PROCEDURE:  Patient ID verified with 2 identifiers (name and  or MRN): Yes  Procedure and site verified with patient/designee (when able): Yes  Accurate consent documentation in medical record: Yes  H&P (or appropriate assessment) documented in medical record: NA  H&P must be up to 20 days prior to procedure and updates within 24 hours of procedure as applicable: NA  Relevant diagnostic and radiology test results appropriately labeled and displayed as applicable: Yes  Procedure  site(s) marked with provider initials: NA    TIMEOUT:  Time-Out performed immediately prior to starting procedure, including verbal and active participation of all team members addressing the following:Yes  * Correct patient identify  * Confirmed that the correct side and site are marked  * An accurate procedure consent form  * Agreement on the procedure to be done  * Correct patient position  * Relevant images and results are properly labeled and appropriately displayed  * The need to administer antibiotics or fluids for irrigation purposes during the procedure as applicable   * Safety precautions based on patient history or medication use    DURING PROCEDURE: Verification of correct person, site, and procedures any time the responsibility for care of the patient is transferred to another member of the care team.     The following medications were given:     Prior to injection, verified patient identity using patient's name and date of birth.  Due to injection administration, patient instructed to remain in clinic for 15 minutes  afterwards, and to report any adverse reaction to me immediately.    Joint injection was performed.    Medication Name: Synvisc NDC - [id number]  Drug Amount Wasted:  None.  Vial/Syringe: Syringe  Expiration Date:  09/2021        Marni Cook ATC        Procedure note    Preprocedure diagnosis: Right knee post traumatic osteoarthritis    Postprocedure diagnosis: Right knee post traumatic Saul arthritis    Procedure    Informed consent was obtained.  Using sterile technique the anterior lateral aspect of the right knee was prepped with chlorhexidine x2.  Synvisc was injected in the lateral joint space without difficulty.  He was clean dried Band-Aid was applied.  He will follow-up PRN    Again, thank you for allowing me to participate in the care of your patient.      Sincerely,    Romina Hartman, GENEVIEVE CNP

## 2019-05-16 NOTE — PROGRESS NOTES
Large Joint Injection/Arthocentesis: R knee joint  Date/Time: 2019 8:06 AM  Performed by: Romina Hartman APRN CNP  Authorized by: Romina Hartman APRN CNP     Indications:  Osteoarthritis and pain  Needle Size:  25 G  Guidance: landmark guided    Approach:  Lateral  Location:  Knee      Medications:  16 mg hylan 16 MG/2ML  Outcome:  Tolerated well, no immediate complications  Procedure discussed: discussed risks, benefits, and alternatives    Consent Given by:  Patient  Timeout: timeout called immediately prior to procedure    Prep: patient was prepped and draped in usual sterile fashion          [facility] [address] [address]  [phone]  Dept: [phone]  ______________________________________________________________________________    Patient: Aydin Aldridge   : 1970   MRN: [id number]   May 16, 2019    INVASIVE PROCEDURE SAFETY CHECKLIST    Date: 2019   Procedure:Right knee synvisc injection  Patient Name: Aydin Aldridge  MRN: [id number]  YOB: 1970    Action: Complete sections as appropriate. Any discrepancy results in a HARD COPY until resolved.     PRE PROCEDURE:  Patient ID verified with 2 identifiers (name and  or MRN): Yes  Procedure and site verified with patient/designee (when able): Yes  Accurate consent documentation in medical record: Yes  H&P (or appropriate assessment) documented in medical record: NA  H&P must be up to 20 days prior to procedure and updates within 24 hours of procedure as applicable: NA  Relevant diagnostic and radiology test results appropriately labeled and displayed as applicable: Yes  Procedure site(s) marked with provider initials: NA    TIMEOUT:  Time-Out performed immediately prior to starting procedure, including verbal and active participation of all team members addressing the following:Yes  * Correct patient identify  * Confirmed that the correct side and site are  marked  * An accurate procedure consent form  * Agreement on the procedure to be done  * Correct patient position  * Relevant images and results are properly labeled and appropriately displayed  * The need to administer antibiotics or fluids for irrigation purposes during the procedure as applicable   * Safety precautions based on patient history or medication use    DURING PROCEDURE: Verification of correct person, site, and procedures any time the responsibility for care of the patient is transferred to another member of the care team.       The following medications were given:         Prior to injection, verified patient identity using patient's name and date of birth.  Due to injection administration, patient instructed to remain in clinic for 15 minutes  afterwards, and to report any adverse reaction to me immediately.    Joint injection was performed.    Medication Name: Synvisc NDC - 58887-11377  Drug Amount Wasted:  None.  Vial/Syringe: Syringe  Expiration Date:  09/2021        Marni Cook ATC

## 2019-05-16 NOTE — PROGRESS NOTES
Procedure note    Preprocedure diagnosis: Right knee post traumatic osteoarthritis    Postprocedure diagnosis: Right knee post traumatic Saul arthritis    Procedure    Informed consent was obtained.  Using sterile technique the anterior lateral aspect of the right knee was prepped with chlorhexidine x2.  Synvisc was injected in the lateral joint space without difficulty.  He was clean dried Band-Aid was applied.  He will follow-up PRN

## 2019-05-22 ENCOUNTER — COMMUNICATION - HEALTHEAST (OUTPATIENT)
Dept: FAMILY MEDICINE | Facility: CLINIC | Age: 49
End: 2019-05-22

## 2019-05-22 DIAGNOSIS — Z01.818 PRE-OP EVALUATION: ICD-10-CM

## 2019-05-22 ASSESSMENT — MIFFLIN-ST. JEOR: SCORE: 2168.53

## 2019-05-23 ENCOUNTER — SURGERY - HEALTHEAST (OUTPATIENT)
Dept: SURGERY | Facility: HOSPITAL | Age: 49
End: 2019-05-23

## 2019-05-23 ENCOUNTER — ANESTHESIA - HEALTHEAST (OUTPATIENT)
Dept: SURGERY | Facility: HOSPITAL | Age: 49
End: 2019-05-23

## 2019-06-04 ENCOUNTER — TELEPHONE (OUTPATIENT)
Dept: ORTHOPEDICS | Facility: CLINIC | Age: 49
End: 2019-06-04

## 2019-06-04 NOTE — TELEPHONE ENCOUNTER
PA Initiation    Medication: hylan (SYNVISC) 16 MG/2ML injection  Insurance Company: [employer] - Phone [phone] Fax [phone]  Pharmacy Filling the Rx: [facility] [place], [place] - [address] [place] [address] [phone]  Filling Pharmacy Phone:    Filling Pharmacy Fax:    Start Date: 6/4/2019

## 2019-06-07 ENCOUNTER — OFFICE VISIT - HEALTHEAST (OUTPATIENT)
Dept: CARDIOLOGY | Facility: CLINIC | Age: 49
End: 2019-06-07

## 2019-06-07 DIAGNOSIS — Z82.49 FAMILY HISTORY OF ISCHEMIC HEART DISEASE: ICD-10-CM

## 2019-06-07 DIAGNOSIS — I10 ESSENTIAL HYPERTENSION WITH GOAL BLOOD PRESSURE LESS THAN 130/85: ICD-10-CM

## 2019-06-07 ASSESSMENT — MIFFLIN-ST. JEOR: SCORE: 2134.2

## 2019-06-10 NOTE — TELEPHONE ENCOUNTER
Prior Authorization Not Needed per Insurance    Medication: hylan (SYNVISC) 16 MG/2ML injection - NOT NEEDED  Insurance Company: [employer] - Phone [phone] Fax [phone]  Expected CoPay: [facility]     Pharmacy Filling the Rx: [facility] [place], [place] - [address] [facility] [phone]  Pharmacy Notified:  No  Patient Notified:  No

## 2019-06-17 ENCOUNTER — HOSPITAL ENCOUNTER (OUTPATIENT)
Dept: CT IMAGING | Facility: CLINIC | Age: 49
Discharge: HOME | End: 2019-06-17
Attending: INTERNAL MEDICINE

## 2019-06-17 DIAGNOSIS — Z82.49 FAMILY HISTORY OF ISCHEMIC HEART DISEASE: ICD-10-CM

## 2019-06-17 LAB
CV CALCIUM SCORE AGATSTON LM: 0
CV CALCIUM SCORING AGATSON LAD: 0
CV CALCIUM SCORING AGATSTON CX: 0
CV CALCIUM SCORING AGATSTON RCA: 0
CV CALCIUM SCORING AGATSTON TOTAL: 0

## 2019-06-18 ENCOUNTER — COMMUNICATION - HEALTHEAST (OUTPATIENT)
Dept: CARDIOLOGY | Facility: CLINIC | Age: 49
End: 2019-06-18

## 2019-07-23 ENCOUNTER — RECORDS - HEALTHEAST (OUTPATIENT)
Dept: ADMINISTRATIVE | Facility: OTHER | Age: 49
End: 2019-07-23

## 2019-08-08 ENCOUNTER — TELEPHONE (OUTPATIENT)
Dept: ORTHOPEDICS | Facility: CLINIC | Age: 49
End: 2019-08-08

## 2019-08-08 NOTE — TELEPHONE ENCOUNTER
[facility] Call Center    Phone Message    May a detailed message be left on voicemail: no    Reason for Call: Other: Robert from [employer] insurance requesting call back to discuss the pt's knee injections     Action Taken: Message routed to:  [facility] ortho

## 2019-09-13 ENCOUNTER — OFFICE VISIT - HEALTHEAST (OUTPATIENT)
Dept: PULMONOLOGY | Facility: OTHER | Age: 49
End: 2019-09-13

## 2019-09-13 DIAGNOSIS — R05.9 COUGH: ICD-10-CM

## 2019-09-18 ENCOUNTER — HOSPITAL ENCOUNTER (OUTPATIENT)
Dept: RADIOLOGY | Facility: HOSPITAL | Age: 49
Discharge: HOME | End: 2019-09-18
Attending: INTERNAL MEDICINE

## 2019-09-18 DIAGNOSIS — R05.9 COUGH: ICD-10-CM

## 2019-09-25 ENCOUNTER — OFFICE VISIT - HEALTHEAST (OUTPATIENT)
Dept: OTOLARYNGOLOGY | Facility: CLINIC | Age: 49
End: 2019-09-25

## 2019-09-25 DIAGNOSIS — J37.0 CHRONIC LARYNGITIS: ICD-10-CM

## 2019-09-25 DIAGNOSIS — K21.9 LARYNGOPHARYNGEAL REFLUX (LPR): ICD-10-CM

## 2019-09-25 DIAGNOSIS — R04.0 EPISTAXIS: ICD-10-CM

## 2019-09-26 ENCOUNTER — RECORDS - HEALTHEAST (OUTPATIENT)
Dept: ADMINISTRATIVE | Facility: OTHER | Age: 49
End: 2019-09-26

## 2019-09-30 ENCOUNTER — HOSPITAL ENCOUNTER (OUTPATIENT)
Dept: RESPIRATORY THERAPY | Facility: HOSPITAL | Age: 49
Discharge: HOME | End: 2019-09-30

## 2019-09-30 DIAGNOSIS — R05.9 COUGH: ICD-10-CM

## 2019-10-04 ENCOUNTER — TELEPHONE (OUTPATIENT)
Dept: ORTHOPEDICS | Facility: CLINIC | Age: 49
End: 2019-10-04

## 2019-10-04 DIAGNOSIS — M17.31 POST-TRAUMATIC OSTEOARTHRITIS OF RIGHT KNEE: Primary | ICD-10-CM

## 2019-10-04 NOTE — TELEPHONE ENCOUNTER
[facility] Call Center    Phone Message    May a detailed message be left on voicemail: yes    Reason for Call: Other: Pt needs to get prior auth started for Synvisc injections. Also, would like to discuss having xray done. Please call pt when prior auth is complete.      Action Taken: Message routed to:  [facility] Ortho

## 2019-10-10 NOTE — TELEPHONE ENCOUNTER
Aydin was phoned back by [facility] regarding visco supplement and voicemail was left that we are looking into resubmitting viscosupplement PA.    In looking at the last PA denial, it appears that hand OA may have been attached as a diagnosis.  We will have our PA team resubmit with the correct diagnosis and get back to pt regarding the results.  When we see pt next in clinic, we will obtain updated xrays.  Tanvi Neil RN

## 2019-10-28 ENCOUNTER — AMBULATORY - HEALTHEAST (OUTPATIENT)
Dept: PULMONOLOGY | Facility: OTHER | Age: 49
End: 2019-10-28

## 2019-10-28 DIAGNOSIS — R05.9 COUGH: ICD-10-CM

## 2019-11-06 ENCOUNTER — HEALTH MAINTENANCE LETTER (OUTPATIENT)
Age: 49
End: 2019-11-06

## 2019-11-20 DIAGNOSIS — M17.32 POST-TRAUMATIC OSTEOARTHRITIS OF LEFT KNEE: Primary | ICD-10-CM

## 2019-11-21 ENCOUNTER — OFFICE VISIT (OUTPATIENT)
Dept: ORTHOPEDICS | Facility: CLINIC | Age: 49
End: 2019-11-21
Payer: COMMERCIAL

## 2019-11-21 ENCOUNTER — ANCILLARY PROCEDURE (OUTPATIENT)
Dept: GENERAL RADIOLOGY | Facility: CLINIC | Age: 49
End: 2019-11-21
Attending: NURSE PRACTITIONER
Payer: COMMERCIAL

## 2019-11-21 VITALS — WEIGHT: 282 LBS | HEIGHT: 75 IN | BODY MASS INDEX: 35.06 KG/M2

## 2019-11-21 DIAGNOSIS — M17.32 POST-TRAUMATIC OSTEOARTHRITIS OF LEFT KNEE: ICD-10-CM

## 2019-11-21 DIAGNOSIS — M17.2 POST-TRAUMATIC OSTEOARTHRITIS OF KNEES, BILATERAL: Primary | ICD-10-CM

## 2019-11-21 RX ORDER — TRIAMCINOLONE ACETONIDE 40 MG/ML
40 INJECTION, SUSPENSION INTRA-ARTICULAR; INTRAMUSCULAR
Status: DISCONTINUED | OUTPATIENT
Start: 2019-11-21 | End: 2020-10-15

## 2019-11-21 RX ORDER — LIDOCAINE HYDROCHLORIDE 10 MG/ML
2 INJECTION, SOLUTION EPIDURAL; INFILTRATION; INTRACAUDAL; PERINEURAL
Status: DISCONTINUED | OUTPATIENT
Start: 2019-11-21 | End: 2020-10-15

## 2019-11-21 RX ADMIN — LIDOCAINE HYDROCHLORIDE 2 ML: 10 INJECTION, SOLUTION EPIDURAL; INFILTRATION; INTRACAUDAL; PERINEURAL at 08:34

## 2019-11-21 RX ADMIN — TRIAMCINOLONE ACETONIDE 40 MG: 40 INJECTION, SUSPENSION INTRA-ARTICULAR; INTRAMUSCULAR at 08:34

## 2019-11-21 ASSESSMENT — MIFFLIN-ST. JEOR: SCORE: 2237.89

## 2019-11-21 NOTE — LETTER
2019       RE: Aydin Aldridge  [address]  [place] [address]     Dear Colleague,    Thank you for referring your patient, Aydin Aldridge, to the [facility] at [facility]. Please see a copy of my visit note below.    Large Joint Injection/Arthocentesis: bilateral knee  Date/Time: 2019 8:34 AM  Performed by: Romina Hartman APRN CNP  Authorized by: Romina Hartman APRN CNP     Indications:  Pain and osteoarthritis  Needle Size:  25 G  Guidance: landmark guided    Approach:  Lateral  Location:  Knee  Laterality:  Bilateral      Medications (Right):  40 mg triamcinolone 40 MG/ML; 2 mL lidocaine (PF) 1 %  Medications (Left):  40 mg triamcinolone 40 MG/ML; 2 mL lidocaine (PF) 1 %  Outcome:  Tolerated well, no immediate complications  Procedure discussed: discussed risks, benefits, and alternatives    Consent Given by:  Patient  Timeout: timeout called immediately prior to procedure    Prep: patient was prepped and draped in usual sterile fashion            [facility]  [address] [place] [address] [phone]  Dept: [phone]  ______________________________________________________________________________    Patient: Aydin Aldridge   : 1970   MRN: [id number]   2019    INVASIVE PROCEDURE SAFETY CHECKLIST    Date: 2019   Procedure:Bilateral Knee Cortisone Injections  Patient Name: Aydin Aldridge  MRN: [id number]  YOB: 1970    Action: Complete sections as appropriate. Any discrepancy results in a HARD COPY until resolved.     PRE PROCEDURE:  Patient ID verified with 2 identifiers (name and  or MRN): Yes  Procedure and site verified with patient/designee (when able): Yes  Accurate consent documentation in medical record: Yes  H&P (or appropriate assessment) documented in medical record: NA  H&P must be up to 20 days prior to procedure and  updates within 24 hours of procedure as applicable: NA  Relevant diagnostic and radiology test results appropriately labeled and displayed as applicable: Yes  Procedure site(s) marked with provider initials: NA    TIMEOUT:  Time-Out performed immediately prior to starting procedure, including verbal and active participation of all team members addressing the following:Yes  * Correct patient identify  * Confirmed that the correct side and site are marked  * An accurate procedure consent form  * Agreement on the procedure to be done  * Correct patient position  * Relevant images and results are properly labeled and appropriately displayed  * The need to administer antibiotics or fluids for irrigation purposes during the procedure as applicable   * Safety precautions based on patient history or medication use    DURING PROCEDURE: Verification of correct person, site, and procedures any time the responsibility for care of the patient is transferred to another member of the care team.       The following medications were given:         Prior to injection, verified patient identity using patient's name and date of birth.  Due to injection administration, patient instructed to remain in clinic for 15 minutes  afterwards, and to report any adverse reaction to me immediately.    Joint injection was performed.    Medication Name: Lidocaine NDC: 73214-693-26  Drug Amount Wasted:  Yes: 1 mg/ml   Vial/Syringe: Single dose vial  Expiration Date:  5/1/23      Kenalog NDC: [address]-1 both knees        Chow Kary, ATC        CC: bilateral knee pain R>L    HPI:  Aydin is an established patient who is seen for persistent bilateral knee pain right greater than left. He has an extensive history of        undergoing a lengthening over nail with external fixation of his right femur 13 years ago by Dr. Cahng at [facility]. Unfortunately, his right knee developed severe scarring raghav a result of the extensive involvement of the  "above procedure. He had a manipulation under anesthesia that helped regain about 75% of his motion. He continues to focus on exercises that focus on range of motion and strengthening to the best of his ability.  We have been focusing on joint preservation to help him maintain his ADL's with controlled pain. However, he does state that when his knee is at it's worst he is unable to \"do anything at all\". He has even used a cane. He rates his daily pain now a \"7 or 8\".  We have tried various types of braces both custom and over the counter. He has also done various nsaids both prescription and over the counter that do provide some \"intermittent relief\". He does have activity related night pain and it is constantly swollen. He denies mechanical symptoms such as locking or catching just pain. The supartz injections we have given in the past to his right knee provides 75% pain relief for 6-8 months.    Throughout the past few years, Aydin has also has worsening left knee pain from \"overuse\" from the multiple trauma done to his right lower extremity.  He rates his left knee pain a \"4 or 5\" with a constant ache. He is unable to localize it but states it is \"mostly on the inside\". He denies mechanical symptoms but says at times he \"doesn't trust it\".  He takes over the counter NSAIDS at times which provides intermittent temporary relief. Denies night pain or consistent swelling. Denies any specific episodes of trauma or injury. The last series of viscosupplements provided \"at least 75% pain relief for 6-8 months\".     PMH: Reviewed from patient chart today 11/21/2019    ROS: Reviewed from patient tablet today 11/21/2019 with all systems negative except for those listed below.    PE:  Pleasant and cooperative male alert and oriented x3. He is 6'3\" and weighs 282 pounds with a BMI of 35. He arises from chair guarded using side rails for assistance.    His right knee has a mild effusion without focal areas of tenderness. Quad " strength 4-/5 with VMO weakness. Previous incisions are all healed. ROM:-. No calf pain. Tenderness to palpate the medial and lateral joint joint. Patella has painful crepitus with passive flexion/extension exercises. Negative lachmans exam. Minimal pseudolaxity in valgus/varus at 0, 45, and 90 degrees.     Upon further exam of the left knee there is mild tenderness to palpate the medial joint space and lateral joint. No patella crepitus and it is not painful. Negative lachmans exam. Minimal pseudolaxity in valgus/varus stress at 0, 45, and 90 degrees. No joint effusion. No focal areas of tenderness. Skin is intact without rashes, bruises, or lesions and no previous incisions. ROM: 0-122.     Xrays taken today show post traumatic joint space narrowing with significant irregularity to his lateral joint space on the right knee with a distal femoral osteophyte noted. Medial joint space is over 50% narrowed. Patella tracks laterally with bone to bone changes noted on the right versus the left. Right knee has mild joint space narrowing to the medial and lateral joint space. No acute fractures, bone lesions, tumors, or obvious subchondral edema noted.    Dx:  1. Right knee, advanced, grade IV, bone on bone post traumatic osteoarthritis  2. Left knee, mild to moderate osteoarthritis    Plan;  1. Today I offered Aydin cortisone injections and because the viscosupplements have helped him significantly in the past, we will get insurance approval to repeat the series. We will try 4 weeks of formal physical therapy to focus on quad/hamstring strengthening.   2. Will follow up after injections are approved.  3. Follow up prn or if symptoms changed.    Total time spent was 40 minutes with greater than 50% spent in face to face consultation and collaboration of care.      Procedure Note    Pre Procedure dx: bilateral knee osteoarthritis    Post Procedure dx: bilateral knee osteoarthritis    Procedure:  Informed consent was  obtained. Using sterile technique, the anterior lateral aspect of both knees were prepped with chlorahexadine x2. 1cc of 40 mg of kenalog and 2 ml of lidocaine was injected into the lateral joint space without difficulty. Will follow up as above.    Again, thank you for allowing me to participate in the care of your patient.      Sincerely,    GENEVIEVE Sykes CNP

## 2019-11-21 NOTE — NURSING NOTE
"Reason For Visit:   Chief Complaint   Patient presents with     RECHECK     bilateral knee pain// bilateral knee cortisone injections       Ht 1.91 m (6' 3.2\")   Wt 127.9 kg (282 lb)   BMI 35.06 kg/m      Pain Assessment  Patient Currently in Pain: Yes  0-10 Pain Scale: 3  Primary Pain Location: Knee  Pain Descriptors: Constant  Aggravating Factors: (getting up)    Chow Kary, ATC    "

## 2019-11-21 NOTE — PROGRESS NOTES
"CC: bilateral knee pain R>L    HPI:  Aydin is an established patient who is seen for persistent bilateral knee pain right greater than left. He has an extensive history of        undergoing a lengthening over nail with external fixation of his right femur 13 years ago by Dr. Chang at [facility]. Unfortunately, his right knee developed severe scarring raghav a result of the extensive involvement of the above procedure. He had a manipulation under anesthesia that helped regain about 75% of his motion. He continues to focus on exercises that focus on range of motion and strengthening to the best of his ability.  We have been focusing on joint preservation to help him maintain his ADL's with controlled pain. However, he does state that when his knee is at it's worst he is unable to \"do anything at all\". He has even used a cane. He rates his daily pain now a \"7 or 8\".  We have tried various types of braces both custom and over the counter. He has also done various nsaids both prescription and over the counter that do provide some \"intermittent relief\". He does have activity related night pain and it is constantly swollen. He denies mechanical symptoms such as locking or catching just pain. The supartz injections we have given in the past to his right knee provides 75% pain relief for 6-8 months.    Throughout the past few years, Aydin has also has worsening left knee pain from \"overuse\" from the multiple trauma done to his right lower extremity.  He rates his left knee pain a \"4 or 5\" with a constant ache. He is unable to localize it but states it is \"mostly on the inside\". He denies mechanical symptoms but says at times he \"doesn't trust it\".  He takes over the counter NSAIDS at times which provides intermittent temporary relief. Denies night pain or consistent swelling. Denies any specific episodes of trauma or injury. The last series of viscosupplements provided \"at least 75% pain relief for 6-8 months\". " "    PMH: Reviewed from patient chart today 11/21/2019    ROS: Reviewed from patient tablet today 11/21/2019 with all systems negative except for those listed below.    PE:  Pleasant and cooperative male alert and oriented x3. He is 6'3\" and weighs 282 pounds with a BMI of 35. He arises from chair guarded using side rails for assistance.    His right knee has a mild effusion without focal areas of tenderness. Quad strength 4-/5 with VMO weakness. Previous incisions are all healed. ROM:-. No calf pain. Tenderness to palpate the medial and lateral joint joint. Patella has painful crepitus with passive flexion/extension exercises. Negative lachmans exam. Minimal pseudolaxity in valgus/varus at 0, 45, and 90 degrees.     Upon further exam of the left knee there is mild tenderness to palpate the medial joint space and lateral joint. No patella crepitus and it is not painful. Negative lachmans exam. Minimal pseudolaxity in valgus/varus stress at 0, 45, and 90 degrees. No joint effusion. No focal areas of tenderness. Skin is intact without rashes, bruises, or lesions and no previous incisions. ROM: 0-122.     Xrays taken today show post traumatic joint space narrowing with significant irregularity to his lateral joint space on the right knee with a distal femoral osteophyte noted. Medial joint space is over 50% narrowed. Patella tracks laterally with bone to bone changes noted on the right versus the left. Right knee has mild joint space narrowing to the medial and lateral joint space. No acute fractures, bone lesions, tumors, or obvious subchondral edema noted.    Dx:  1. Right knee, advanced, grade IV, bone on bone post traumatic osteoarthritis  2. Left knee, mild to moderate osteoarthritis    Plan;  1. Today I offered Aydin cortisone injections and because the viscosupplements have helped him significantly in the past, we will get insurance approval to repeat the series. We will try 4 weeks of formal physical " therapy to focus on quad/hamstring strengthening.   2. Will follow up after injections are approved.  3. Follow up prn or if symptoms changed.    Total time spent was 40 minutes with greater than 50% spent in face to face consultation and collaboration of care.      Procedure Note    Pre Procedure dx: bilateral knee osteoarthritis    Post Procedure dx: bilateral knee osteoarthritis    Procedure:  Informed consent was obtained. Using sterile technique, the anterior lateral aspect of both knees were prepped with chlorahexadine x2. 1cc of 40 mg of kenalog and 2 ml of lidocaine was injected into the lateral joint space without difficulty. Will follow up as above.

## 2019-11-21 NOTE — PROGRESS NOTES
Large Joint Injection/Arthocentesis: bilateral knee  Date/Time: 2019 8:34 AM  Performed by: Romina Hartman APRN CNP  Authorized by: Romina Hartman APRN CNP     Indications:  Pain and osteoarthritis  Needle Size:  25 G  Guidance: landmark guided    Approach:  Lateral  Location:  Knee  Laterality:  Bilateral      Medications (Right):  40 mg triamcinolone 40 MG/ML; 2 mL lidocaine (PF) 1 %  Medications (Left):  40 mg triamcinolone 40 MG/ML; 2 mL lidocaine (PF) 1 %  Outcome:  Tolerated well, no immediate complications  Procedure discussed: discussed risks, benefits, and alternatives    Consent Given by:  Patient  Timeout: timeout called immediately prior to procedure    Prep: patient was prepped and draped in usual sterile fashion            [facility] [address] [address]  [phone]  Dept: [phone]  ______________________________________________________________________________    Patient: Aydin Aldridge   : 1970   MRN: [id number]   2019    INVASIVE PROCEDURE SAFETY CHECKLIST    Date: 2019   Procedure:Bilateral Knee Cortisone Injections  Patient Name: Aydin Aldridge  MRN: [id number]  YOB: 1970    Action: Complete sections as appropriate. Any discrepancy results in a HARD COPY until resolved.     PRE PROCEDURE:  Patient ID verified with 2 identifiers (name and  or MRN): Yes  Procedure and site verified with patient/designee (when able): Yes  Accurate consent documentation in medical record: Yes  H&P (or appropriate assessment) documented in medical record: NA  H&P must be up to 20 days prior to procedure and updates within 24 hours of procedure as applicable: NA  Relevant diagnostic and radiology test results appropriately labeled and displayed as applicable: Yes  Procedure site(s) marked with provider initials: NA    TIMEOUT:  Time-Out performed immediately prior to starting procedure, including  verbal and active participation of all team members addressing the following:Yes  * Correct patient identify  * Confirmed that the correct side and site are marked  * An accurate procedure consent form  * Agreement on the procedure to be done  * Correct patient position  * Relevant images and results are properly labeled and appropriately displayed  * The need to administer antibiotics or fluids for irrigation purposes during the procedure as applicable   * Safety precautions based on patient history or medication use    DURING PROCEDURE: Verification of correct person, site, and procedures any time the responsibility for care of the patient is transferred to another member of the care team.       The following medications were given:         Prior to injection, verified patient identity using patient's name and date of birth.  Due to injection administration, patient instructed to remain in clinic for 15 minutes  afterwards, and to report any adverse reaction to me immediately.    Joint injection was performed.    Medication Name: Lidocaine NDC: 54907-857-36  Drug Amount Wasted:  Yes: 1 mg/ml   Vial/Syringe: Single dose vial  Expiration Date:  5/1/23      Kenalog NDC: [address]-9 both knees        Claudine Preston ATC

## 2019-11-22 ENCOUNTER — RECORDS - HEALTHEAST (OUTPATIENT)
Dept: ADMINISTRATIVE | Facility: OTHER | Age: 49
End: 2019-11-22

## 2019-12-05 ENCOUNTER — OFFICE VISIT (OUTPATIENT)
Dept: ORTHOPEDICS | Facility: CLINIC | Age: 49
End: 2019-12-05
Payer: COMMERCIAL

## 2019-12-05 DIAGNOSIS — M17.2 POST-TRAUMATIC OSTEOARTHRITIS OF KNEES, BILATERAL: Primary | ICD-10-CM

## 2019-12-05 NOTE — PROGRESS NOTES
Procedure Note    Pre Procedure dx: bilateral knee osteoarthritis    Post procedure dx: bilateral knee osteoarthritis    Procedure  Informed consent was obtained. Using sterile technique, the anterior lateral aspect of both knees were prepped with chlorahexadine x2. Synvisc was injected into bilateral lateral joint spaces without difficulty. Follow up in 1 week for 2nd injection in the series.

## 2019-12-05 NOTE — PROGRESS NOTES
Large Joint Injection/Arthocentesis: bilateral knee  Date/Time: 2019 10:01 AM  Performed by: Romina Hartman APRN CNP  Authorized by: Romina Hartman APRN CNP     Indications:  Pain and osteoarthritis  Needle Size:  21 G  Guidance: landmark guided    Approach:  Lateral  Location:  Knee  Laterality:  Bilateral      Medications (Right):  48 mg hylan 48 MG/6ML  Medications (Left):  48 mg hylan 48 MG/6ML  Outcome:  Tolerated well, no immediate complications  Procedure discussed: discussed risks, benefits, and alternatives    Consent Given by:  Patient  Timeout: timeout called immediately prior to procedure    Prep: patient was prepped and draped in usual sterile fashion            [facility] [place] [address] [address] [address]  [phone]  Dept: [phone]  ______________________________________________________________________________    Patient: Aydin Aldridge   : 1970   MRN: [id number]   2019    INVASIVE PROCEDURE SAFETY CHECKLIST    Date: 2019   Procedure:Bilateral Knee Synvics Injections  Patient Name: Aydin Aldridge  MRN: [id number]  YOB: 1970    Action: Complete sections as appropriate. Any discrepancy results in a HARD COPY until resolved.     PRE PROCEDURE:  Patient ID verified with 2 identifiers (name and  or MRN): Yes  Procedure and site verified with patient/designee (when able): Yes  Accurate consent documentation in medical record: Yes  H&P (or appropriate assessment) documented in medical record: NA  H&P must be up to 20 days prior to procedure and updates within 24 hours of procedure as applicable: NA  Relevant diagnostic and radiology test results appropriately labeled and displayed as applicable: Yes  Procedure site(s) marked with provider initials: NA    TIMEOUT:  Time-Out performed immediately prior to starting procedure, including verbal and active participation of all team members addressing the  following:Yes  * Correct patient identify  * Confirmed that the correct side and site are marked  * An accurate procedure consent form  * Agreement on the procedure to be done  * Correct patient position  * Relevant images and results are properly labeled and appropriately displayed  * The need to administer antibiotics or fluids for irrigation purposes during the procedure as applicable   * Safety precautions based on patient history or medication use    DURING PROCEDURE: Verification of correct person, site, and procedures any time the responsibility for care of the patient is transferred to another member of the care team.       The following medications were given:         Prior to injection, verified patient identity using patient's name and date of birth.  Due to injection administration, patient instructed to remain in clinic for 15 minutes  afterwards, and to report any adverse reaction to me immediately.    Joint injection was performed.    Medication Name: Ovonyx Product Code: [address]-4  Drug Amount Wasted:  None.  Vial/Syringe: Syringe  Expiration Date:  6/1/21    Note: both knees        Chow Kary, ATC

## 2019-12-05 NOTE — LETTER
2019       RE: Aydin Aldridge  [address]  [facility] [address]     Dear Colleague,    Thank you for referring your patient, Aydin Aldridge, to the [facility] at [facility]. Please see a copy of my visit note below.    Procedure Note    Pre Procedure dx: bilateral knee osteoarthritis    Post procedure dx: bilateral knee osteoarthritis    Procedure  Informed consent was obtained. Using sterile technique, the anterior lateral aspect of both knees were prepped with chlorahexadine x2. Synvisc was injected into bilateral lateral joint spaces without difficulty. Follow up in 1 week.     Large Joint Injection/Arthocentesis: bilateral knee  Date/Time: 2019 10:01 AM  Performed by: Romina Hartman APRN CNP  Authorized by: Romina Hartman APRN CNP     Indications:  Pain and osteoarthritis  Needle Size:  21 G  Guidance: landmark guided    Approach:  Lateral  Location:  Knee  Laterality:  Bilateral      Medications (Right):  48 mg hylan 48 MG/6ML  Medications (Left):  48 mg hylan 48 MG/6ML  Outcome:  Tolerated well, no immediate complications  Procedure discussed: discussed risks, benefits, and alternatives    Consent Given by:  Patient  Timeout: timeout called immediately prior to procedure    Prep: patient was prepped and draped in usual sterile fashion            [facility]  [address] [id number] [phone]  Dept: [phone]  ______________________________________________________________________________    Patient: Aydin Aldridge   : 1970   MRN: [id number]   2019    INVASIVE PROCEDURE SAFETY CHECKLIST    Date: 2019   Procedure:Bilateral Knee Synvics Injections  Patient Name: Aydin Aldridge  MRN: [id number]  YOB: 1970    Action: Complete sections as appropriate. Any discrepancy results in a HARD COPY until resolved.     PRE PROCEDURE:  Patient  ID verified with 2 identifiers (name and  or MRN): Yes  Procedure and site verified with patient/designee (when able): Yes  Accurate consent documentation in medical record: Yes  H&P (or appropriate assessment) documented in medical record: NA  H&P must be up to 20 days prior to procedure and updates within 24 hours of procedure as applicable: NA  Relevant diagnostic and radiology test results appropriately labeled and displayed as applicable: Yes  Procedure site(s) marked with provider initials: NA    TIMEOUT:  Time-Out performed immediately prior to starting procedure, including verbal and active participation of all team members addressing the following:Yes  * Correct patient identify  * Confirmed that the correct side and site are marked  * An accurate procedure consent form  * Agreement on the procedure to be done  * Correct patient position  * Relevant images and results are properly labeled and appropriately displayed  * The need to administer antibiotics or fluids for irrigation purposes during the procedure as applicable   * Safety precautions based on patient history or medication use    DURING PROCEDURE: Verification of correct person, site, and procedures any time the responsibility for care of the patient is transferred to another member of the care team.       The following medications were given:         Prior to injection, verified patient identity using patient's name and date of birth.  Due to injection administration, patient instructed to remain in clinic for 15 minutes  afterwards, and to report any adverse reaction to me immediately.    Joint injection was performed.    Medication Name: Synvics Product Code: [address]-1  Drug Amount Wasted:  None.  Vial/Syringe: Syringe  Expiration Date:  21    Note: both knees        Chow Kary ATC        Again, thank you for allowing me to participate in the care of your patient.      Sincerely,    GENEVIEVE Sykes CNP

## 2019-12-05 NOTE — NURSING NOTE
Reason For Visit:   Chief Complaint   Patient presents with     RECHECK     bilateral knee synvisc injection 1 of 3       There were no vitals taken for this visit.    Pain Assessment  Patient Currently in Pain: Jonathon Preston, ATC

## 2019-12-12 ENCOUNTER — OFFICE VISIT (OUTPATIENT)
Dept: ORTHOPEDICS | Facility: CLINIC | Age: 49
End: 2019-12-12
Payer: COMMERCIAL

## 2019-12-12 DIAGNOSIS — M17.2 POST-TRAUMATIC OSTEOARTHRITIS OF KNEES, BILATERAL: Primary | ICD-10-CM

## 2019-12-12 NOTE — PROGRESS NOTES
Large Joint Injection/Arthocentesis: bilateral knee  Date/Time: 2019 8:37 AM  Performed by: Romina Hartman APRN CNP  Authorized by: Romina Hartman APRN CNP     Indications:  Pain and osteoarthritis  Needle Size:  22 G  Guidance: landmark guided    Approach:  Lateral  Location:  Knee  Laterality:  Bilateral      Medications (Right):  48 mg hylan 48 MG/6ML  Medications (Left):  48 mg hylan 48 MG/6ML  Outcome:  Tolerated well, no immediate complications  Procedure discussed: discussed risks, benefits, and alternatives    Consent Given by:  Patient  Timeout: timeout called immediately prior to procedure    Prep: patient was prepped and draped in usual sterile fashion          [facility] [place] [address] [address] [address]  [phone]  Dept: [phone]  ______________________________________________________________________________    Patient: Aydin Aldridge   : 1970   MRN: [id number]   2019    INVASIVE PROCEDURE SAFETY CHECKLIST    Date: 2019   Procedure:Bilateral knee Synvisc Injections  Patient Name: Aydin Aldridge  MRN: [id number]  YOB: 1970    Action: Complete sections as appropriate. Any discrepancy results in a HARD COPY until resolved.     PRE PROCEDURE:  Patient ID verified with 2 identifiers (name and  or MRN): Yes  Procedure and site verified with patient/designee (when able): Yes  Accurate consent documentation in medical record: Yes  H&P (or appropriate assessment) documented in medical record: NA  H&P must be up to 20 days prior to procedure and updates within 24 hours of procedure as applicable: NA  Relevant diagnostic and radiology test results appropriately labeled and displayed as applicable: Yes  Procedure site(s) marked with provider initials: NA    TIMEOUT:  Time-Out performed immediately prior to starting procedure, including verbal and active participation of all team members addressing the  following:Yes  * Correct patient identify  * Confirmed that the correct side and site are marked  * An accurate procedure consent form  * Agreement on the procedure to be done  * Correct patient position  * Relevant images and results are properly labeled and appropriately displayed  * The need to administer antibiotics or fluids for irrigation purposes during the procedure as applicable   * Safety precautions based on patient history or medication use    DURING PROCEDURE: Verification of correct person, site, and procedures any time the responsibility for care of the patient is transferred to another member of the care team.       The following medications were given:         Prior to injection, verified patient identity using patient's name and date of birth.  Due to injection administration, patient instructed to remain in clinic for 15 minutes  afterwards, and to report any adverse reaction to me immediately.    Joint injection was performed.    Medication Name: Synvisc Product Code: [id number] Both knees  Drug Amount Wasted:  None.  Vial/Syringe: Syringe  Expiration Date:  6/1/21        Claudine Preston ATC

## 2019-12-12 NOTE — PROGRESS NOTES
Procedure Note    Pre Procedure dx: bilateral knee osteoarthritis    Post Procedure dx: bilateral knee osteoarthritis    Procedure:  Informed consent was obtained. Using sterile technique, the anterior lateral aspect of both knees were injected with synvisc. Tolerated well. Follow up in 1 week for injection number 3 in the series.

## 2019-12-12 NOTE — NURSING NOTE
Reason For Visit:   Chief Complaint   Patient presents with     RECHECK     bilateral knee synvics injections 2 of 3       There were no vitals taken for this visit.    Pain Assessment  Patient Currently in Pain: Yes  0-10 Pain Scale: 5  Primary Pain Location: Knee(bilateral)    Claudine Preston ATC

## 2019-12-12 NOTE — LETTER
2019       RE: Aydin Aldridge  [address]  [place] [address]     Dear Colleague,    Thank you for referring your patient, Aydin Aldridge, to the [facility] at [facility]. Please see a copy of my visit note below.    Procedure Note    Pre Procedure dx: bilateral knee osteoarthritis    Post Procedure dx: bilateral knee osteoarthritis    Procedure:  Informed consent was obtained. Using sterile technique, the anterior lateral aspect of both knees were injected with synvisc. Tolerated well. Follow up in 1 week.     Large Joint Injection/Arthocentesis: bilateral knee  Date/Time: 2019 8:37 AM  Performed by: Romina Hartman APRN CNP  Authorized by: Romina Hartman APRN CNP     Indications:  Pain and osteoarthritis  Needle Size:  22 G  Guidance: landmark guided    Approach:  Lateral  Location:  Knee  Laterality:  Bilateral      Medications (Right):  48 mg hylan 48 MG/6ML  Medications (Left):  48 mg hylan 48 MG/6ML  Outcome:  Tolerated well, no immediate complications  Procedure discussed: discussed risks, benefits, and alternatives    Consent Given by:  Patient  Timeout: timeout called immediately prior to procedure    Prep: patient was prepped and draped in usual sterile fashion          [facility]  [address] [id number] [phone]  Dept: [phone]  ______________________________________________________________________________    Patient: Aydin Aldridge   : 1970   MRN: [id number]   2019    INVASIVE PROCEDURE SAFETY CHECKLIST    Date: 2019   Procedure:Bilateral knee Synvisc Injections  Patient Name: Aydin Aldridge  MRN: [id number]  YOB: 1970    Action: Complete sections as appropriate. Any discrepancy results in a HARD COPY until resolved.     PRE PROCEDURE:  Patient ID verified with 2 identifiers (name and  or MRN): Yes  Procedure  and site verified with patient/designee (when able): Yes  Accurate consent documentation in medical record: Yes  H&P (or appropriate assessment) documented in medical record: NA  H&P must be up to 20 days prior to procedure and updates within 24 hours of procedure as applicable: NA  Relevant diagnostic and radiology test results appropriately labeled and displayed as applicable: Yes  Procedure site(s) marked with provider initials: NA    TIMEOUT:  Time-Out performed immediately prior to starting procedure, including verbal and active participation of all team members addressing the following:Yes  * Correct patient identify  * Confirmed that the correct side and site are marked  * An accurate procedure consent form  * Agreement on the procedure to be done  * Correct patient position  * Relevant images and results are properly labeled and appropriately displayed  * The need to administer antibiotics or fluids for irrigation purposes during the procedure as applicable   * Safety precautions based on patient history or medication use    DURING PROCEDURE: Verification of correct person, site, and procedures any time the responsibility for care of the patient is transferred to another member of the care team.       The following medications were given:         Prior to injection, verified patient identity using patient's name and date of birth.  Due to injection administration, patient instructed to remain in clinic for 15 minutes  afterwards, and to report any adverse reaction to me immediately.    Joint injection was performed.    Medication Name: Synvisc Product Code: [id number] Both knees  Drug Amount Wasted:  None.  Vial/Syringe: Syringe  Expiration Date:  6/1/21        Claudine Preston ATC        Again, thank you for allowing me to participate in the care of your patient.      Sincerely,    GENEVIEVE Sykes CNP

## 2019-12-19 ENCOUNTER — OFFICE VISIT (OUTPATIENT)
Dept: ORTHOPEDICS | Facility: CLINIC | Age: 49
End: 2019-12-19
Payer: COMMERCIAL

## 2019-12-19 DIAGNOSIS — M17.2 BILATERAL POST-TRAUMATIC OSTEOARTHRITIS OF KNEE: Primary | ICD-10-CM

## 2019-12-19 NOTE — NURSING NOTE
Reason For Visit:   Chief Complaint   Patient presents with     RECHECK     bilateral knee synvisc injections       There were no vitals taken for this visit.    Pain Assessment  Patient Currently in Pain: Yes(no pain today but painful yesterday)  0-10 Pain Scale: 5(yesterday)  Primary Pain Location: Knee(bilateral)    Claudine Preston ATC

## 2019-12-19 NOTE — PROGRESS NOTES
Large Joint Injection/Arthocentesis: bilateral knee  Date/Time: 2019 9:16 AM  Performed by: Romina Hartman APRN CNP  Authorized by: Romina Hartman APRN CNP     Indications:  Pain and osteoarthritis  Needle Size:  22 G  Guidance: landmark guided    Approach:  Lateral  Location:  Knee  Laterality:  Bilateral      Medications (Right):  48 mg hylan 48 MG/6ML  Medications (Left):  48 mg hylan 48 MG/6ML  Outcome:  Tolerated well, no immediate complications  Procedure discussed: discussed risks, benefits, and alternatives    Consent Given by:  Patient  Timeout: timeout called immediately prior to procedure    Prep: patient was prepped and draped in usual sterile fashion          [facility] [place] [id number] [address] [address]  [phone]  Dept: [phone]  ______________________________________________________________________________    Patient: Aydin Aldridge   : 1970   MRN: [id number]   2019    INVASIVE PROCEDURE SAFETY CHECKLIST    Date: 2019   Procedure:BIlateral Knee Synvisc Injections  Patient Name: Aydin Aldridge  MRN: [id number]  YOB: 1970    Action: Complete sections as appropriate. Any discrepancy results in a HARD COPY until resolved.     PRE PROCEDURE:  Patient ID verified with 2 identifiers (name and  or MRN): Yes  Procedure and site verified with patient/designee (when able): Yes  Accurate consent documentation in medical record: Yes  H&P (or appropriate assessment) documented in medical record: NA  H&P must be up to 20 days prior to procedure and updates within 24 hours of procedure as applicable: NA  Relevant diagnostic and radiology test results appropriately labeled and displayed as applicable: Yes  Procedure site(s) marked with provider initials: NA    TIMEOUT:  Time-Out performed immediately prior to starting procedure, including verbal and active participation of all team members addressing the  following:Yes  * Correct patient identify  * Confirmed that the correct side and site are marked  * An accurate procedure consent form  * Agreement on the procedure to be done  * Correct patient position  * Relevant images and results are properly labeled and appropriately displayed  * The need to administer antibiotics or fluids for irrigation purposes during the procedure as applicable   * Safety precautions based on patient history or medication use    DURING PROCEDURE: Verification of correct person, site, and procedures any time the responsibility for care of the patient is transferred to another member of the care team.       The following medications were given:         Prior to injection, verified patient identity using patient's name and date of birth.  Due to injection administration, patient instructed to remain in clinic for 15 minutes  afterwards, and to report any adverse reaction to me immediately.    Joint injection was performed.    Medication Name: Synvisc Product Code: [id number] both knees  Drug Amount Wasted:  None.  Vial/Syringe: Syringe  Expiration Date:  6/1/21        Claudine Preston ATC

## 2019-12-19 NOTE — PROGRESS NOTES
Procedure Note    Pre Procedure dx: bilateral knee osteoarthritis    Post Procedure dx: bilateral knee osteoarthritis    Procedure:  Informed consent was obtained. Using sterile technique, the anterior lateral aspect of lateral knees were prepped with chlorahexadine x2. Synvisc was injected into bilateral lateral joint spaces without difficulty. Tolerated well. Follow up prn.  Answers for HPI/ROS submitted by the patient on 12/19/2019   General Symptoms: No  Skin Symptoms: No  HENT Symptoms: No  EYE SYMPTOMS: No  HEART SYMPTOMS: No  LUNG SYMPTOMS: No  INTESTINAL SYMPTOMS: No  URINARY SYMPTOMS: No  REPRODUCTIVE SYMPTOMS: No  SKELETAL SYMPTOMS: No  BLOOD SYMPTOMS: No  NERVOUS SYSTEM SYMPTOMS: No  MENTAL HEALTH SYMPTOMS: No

## 2019-12-19 NOTE — LETTER
12/19/2019       RE: Aydin Aldridge  [address]  [facility] [address]     Dear Colleague,    Thank you for referring your patient, Aydin Aldridge, to the [facility] at [facility]. Please see a copy of my visit note below.    Procedure Note    Pre Procedure dx: bilateral knee osteoarthritis    Post Procedure dx: bilateral knee osteoarthritis    Procedure:  Informed consent was obtained. Using sterile technique, the anterior lateral aspect of lateral knees were prepped with chlorahexadine x2. Synvisc was injected into bilateral lateral joint spaces without difficulty. Tolerated well. Follow up prn.  Answers for HPI/ROS submitted by the patient on 12/19/2019   General Symptoms: No  Skin Symptoms: No  HENT Symptoms: No  EYE SYMPTOMS: No  HEART SYMPTOMS: No  LUNG SYMPTOMS: No  INTESTINAL SYMPTOMS: No  URINARY SYMPTOMS: No  REPRODUCTIVE SYMPTOMS: No  SKELETAL SYMPTOMS: No  BLOOD SYMPTOMS: No  NERVOUS SYSTEM SYMPTOMS: No  MENTAL HEALTH SYMPTOMS: No      Large Joint Injection/Arthocentesis: bilateral knee  Date/Time: 12/19/2019 9:16 AM  Performed by: Romina Hartman APRN CNP  Authorized by: Romina Hartman, GENEVIEVE CNP     Indications:  Pain and osteoarthritis  Needle Size:  22 G  Guidance: landmark guided    Approach:  Lateral  Location:  Knee  Laterality:  Bilateral      Medications (Right):  48 mg hylan 48 MG/6ML  Medications (Left):  48 mg hylan 48 MG/6ML  Outcome:  Tolerated well, no immediate complications  Procedure discussed: discussed risks, benefits, and alternatives    Consent Given by:  Patient  Timeout: timeout called immediately prior to procedure    Prep: patient was prepped and draped in usual sterile fashion          [facility]  [address] [id number] [phone]  Dept: [phone]  ______________________________________________________________________________    Patient: Aydin  Luis Carlos   : 1970   MRN: [id number]   2019    INVASIVE PROCEDURE SAFETY CHECKLIST    Date: 2019   Procedure:BIlateral Knee Synvisc Injections  Patient Name: Aydin Aldridge  MRN: [id number]  YOB: 1970    Action: Complete sections as appropriate. Any discrepancy results in a HARD COPY until resolved.     PRE PROCEDURE:  Patient ID verified with 2 identifiers (name and  or MRN): Yes  Procedure and site verified with patient/designee (when able): Yes  Accurate consent documentation in medical record: Yes  H&P (or appropriate assessment) documented in medical record: NA  H&P must be up to 20 days prior to procedure and updates within 24 hours of procedure as applicable: NA  Relevant diagnostic and radiology test results appropriately labeled and displayed as applicable: Yes  Procedure site(s) marked with provider initials: NA    TIMEOUT:  Time-Out performed immediately prior to starting procedure, including verbal and active participation of all team members addressing the following:Yes  * Correct patient identify  * Confirmed that the correct side and site are marked  * An accurate procedure consent form  * Agreement on the procedure to be done  * Correct patient position  * Relevant images and results are properly labeled and appropriately displayed  * The need to administer antibiotics or fluids for irrigation purposes during the procedure as applicable   * Safety precautions based on patient history or medication use    DURING PROCEDURE: Verification of correct person, site, and procedures any time the responsibility for care of the patient is transferred to another member of the care team.       The following medications were given:         Prior to injection, verified patient identity using patient's name and date of birth.  Due to injection administration, patient instructed to remain in clinic for 15 minutes  afterwards, and to report any adverse reaction to me  immediately.    Joint injection was performed.    Medication Name: Synvisc Product Code: [id number] both knees  Drug Amount Wasted:  None.  Vial/Syringe: Syringe  Expiration Date:  6/1/21        ABHIJIT Manzo APRN CNP

## 2019-12-23 PROBLEM — M17.0 OSTEOARTHRITIS OF BOTH KNEES, UNSPECIFIED OSTEOARTHRITIS TYPE: Status: ACTIVE | Noted: 2019-12-23

## 2019-12-23 PROBLEM — M17.2 POST-TRAUMATIC OSTEOARTHRITIS OF BOTH KNEES: Status: ACTIVE | Noted: 2019-12-23

## 2020-03-11 ENCOUNTER — COMMUNICATION - HEALTHEAST (OUTPATIENT)
Dept: FAMILY MEDICINE | Facility: CLINIC | Age: 50
End: 2020-03-11

## 2020-03-11 ENCOUNTER — COMMUNICATION - HEALTHEAST (OUTPATIENT)
Dept: CARDIOLOGY | Facility: CLINIC | Age: 50
End: 2020-03-11

## 2020-03-11 DIAGNOSIS — I10 ESSENTIAL HYPERTENSION WITH GOAL BLOOD PRESSURE LESS THAN 130/85: ICD-10-CM

## 2020-03-11 DIAGNOSIS — Z98.890 HISTORY OF ESOPHAGOGASTRODUODENOSCOPY (EGD): ICD-10-CM

## 2020-04-17 ENCOUNTER — COMMUNICATION - HEALTHEAST (OUTPATIENT)
Dept: FAMILY MEDICINE | Facility: CLINIC | Age: 50
End: 2020-04-17

## 2020-06-24 ENCOUNTER — COMMUNICATION - HEALTHEAST (OUTPATIENT)
Dept: FAMILY MEDICINE | Facility: CLINIC | Age: 50
End: 2020-06-24

## 2020-06-24 DIAGNOSIS — Z98.890 HISTORY OF ESOPHAGOGASTRODUODENOSCOPY (EGD): ICD-10-CM

## 2020-06-26 ENCOUNTER — COMMUNICATION - HEALTHEAST (OUTPATIENT)
Dept: FAMILY MEDICINE | Facility: CLINIC | Age: 50
End: 2020-06-26

## 2020-06-29 ENCOUNTER — OFFICE VISIT - HEALTHEAST (OUTPATIENT)
Dept: FAMILY MEDICINE | Facility: CLINIC | Age: 50
End: 2020-06-29

## 2020-06-29 ENCOUNTER — COMMUNICATION - HEALTHEAST (OUTPATIENT)
Dept: ADMINISTRATIVE | Facility: CLINIC | Age: 50
End: 2020-06-29

## 2020-06-29 DIAGNOSIS — E89.0 H/O PARTIAL THYROIDECTOMY: ICD-10-CM

## 2020-06-29 DIAGNOSIS — Z98.890 HISTORY OF ESOPHAGOGASTRODUODENOSCOPY (EGD): ICD-10-CM

## 2020-06-29 DIAGNOSIS — I10 ESSENTIAL HYPERTENSION: ICD-10-CM

## 2020-06-29 LAB
ANION GAP SERPL CALCULATED.3IONS-SCNC: 11 MMOL/L (ref 5–18)
BUN SERPL-MCNC: 14 MG/DL (ref 8–22)
CALCIUM SERPL-MCNC: 9.7 MG/DL (ref 8.5–10.5)
CHLORIDE BLD-SCNC: 99 MMOL/L (ref 98–107)
CHOLEST SERPL-MCNC: 204 MG/DL
CO2 SERPL-SCNC: 29 MMOL/L (ref 22–31)
CREAT SERPL-MCNC: 1.16 MG/DL (ref 0.7–1.3)
FASTING STATUS PATIENT QL REPORTED: ABNORMAL
GFR SERPL CREATININE-BSD FRML MDRD: >60 ML/MIN/1.73M2
GLUCOSE BLD-MCNC: 108 MG/DL (ref 70–125)
HDLC SERPL-MCNC: 36 MG/DL
LDLC SERPL CALC-MCNC: 134 MG/DL
POTASSIUM BLD-SCNC: 3.5 MMOL/L (ref 3.5–5)
SODIUM SERPL-SCNC: 139 MMOL/L (ref 136–145)
TRIGL SERPL-MCNC: 172 MG/DL
TSH SERPL DL<=0.005 MIU/L-ACNC: 1.12 UIU/ML (ref 0.3–5)

## 2020-07-01 ENCOUNTER — COMMUNICATION - HEALTHEAST (OUTPATIENT)
Dept: FAMILY MEDICINE | Facility: CLINIC | Age: 50
End: 2020-07-01

## 2020-07-02 ENCOUNTER — COMMUNICATION - HEALTHEAST (OUTPATIENT)
Dept: FAMILY MEDICINE | Facility: CLINIC | Age: 50
End: 2020-07-02

## 2020-07-16 ENCOUNTER — TELEPHONE (OUTPATIENT)
Dept: ORTHOPEDICS | Facility: CLINIC | Age: 50
End: 2020-07-16

## 2020-07-16 NOTE — TELEPHONE ENCOUNTER
Aydin was called back by RN.  We will change his and his mother's clinic appts to allow Aydin to review his treatment plan with Dr Chang.  Tanvi Neil RN

## 2020-07-16 NOTE — TELEPHONE ENCOUNTER
[facility] Call Center    Phone Message    May a detailed message be left on voicemail: yes     Reason for Call: Other:   Pt scheduled an appt with Minnie on 07/27 but would like to see Charlene for a couple of minutes to get his 'blessing' for a knee surgery. Pt would like to know if this is possible and if not, please call pt to let him know.     Action Taken: Other:  ortho    Travel Screening: Not Applicable

## 2020-07-24 ENCOUNTER — COMMUNICATION - HEALTHEAST (OUTPATIENT)
Dept: CARDIOLOGY | Facility: CLINIC | Age: 50
End: 2020-07-24

## 2020-07-24 DIAGNOSIS — I10 ESSENTIAL HYPERTENSION WITH GOAL BLOOD PRESSURE LESS THAN 130/85: ICD-10-CM

## 2020-07-27 ENCOUNTER — OFFICE VISIT (OUTPATIENT)
Dept: ORTHOPEDICS | Facility: CLINIC | Age: 50
End: 2020-07-27
Payer: COMMERCIAL

## 2020-07-27 ENCOUNTER — PREP FOR PROCEDURE (OUTPATIENT)
Dept: ORTHOPEDICS | Facility: CLINIC | Age: 50
End: 2020-07-27

## 2020-07-27 DIAGNOSIS — Z11.59 ENCOUNTER FOR SCREENING FOR OTHER VIRAL DISEASES: Primary | ICD-10-CM

## 2020-07-27 DIAGNOSIS — M17.31 POST-TRAUMATIC OSTEOARTHRITIS OF RIGHT KNEE: Primary | ICD-10-CM

## 2020-07-27 RX ORDER — LIDOCAINE HYDROCHLORIDE 10 MG/ML
2 INJECTION, SOLUTION EPIDURAL; INFILTRATION; INTRACAUDAL; PERINEURAL
Status: DISCONTINUED | OUTPATIENT
Start: 2020-07-27 | End: 2020-10-15

## 2020-07-27 RX ORDER — TRIAMCINOLONE ACETONIDE 40 MG/ML
40 INJECTION, SUSPENSION INTRA-ARTICULAR; INTRAMUSCULAR
Status: DISCONTINUED | OUTPATIENT
Start: 2020-07-27 | End: 2020-10-15

## 2020-07-27 RX ADMIN — TRIAMCINOLONE ACETONIDE 40 MG: 40 INJECTION, SUSPENSION INTRA-ARTICULAR; INTRAMUSCULAR at 12:02

## 2020-07-27 RX ADMIN — LIDOCAINE HYDROCHLORIDE 2 ML: 10 INJECTION, SOLUTION EPIDURAL; INFILTRATION; INTRACAUDAL; PERINEURAL at 12:02

## 2020-07-27 NOTE — PROGRESS NOTES
Large Joint Injection/Arthocentesis: R knee joint    Date/Time: 2020 12:02 PM  Performed by: Romina Hartman APRN CNP  Authorized by: Romina Hartman APRN CNP     Indications:  Pain  Needle Size:  25 G  Guidance: landmark guided    Location:  Knee      Medications:  40 mg triamcinolone 40 MG/ML; 2 mL lidocaine (PF) 1 %  Outcome:  Tolerated well, no immediate complications  Procedure discussed: discussed risks, benefits, and alternatives    Consent Given by:  Patient  Timeout: timeout called immediately prior to procedure    Prep: patient was prepped and draped in usual sterile fashion        [facility]  [address] [address]  [phone]  Dept: [phone]  ______________________________________________________________________________    Patient: Aydin Aldridge   : 1970   MRN: [id number]   2020    INVASIVE PROCEDURE SAFETY CHECKLIST    Date: 2020   Procedure: right knee cortisone injetion   Patient Name: Aydin Aldridge  MRN: [id number]  YOB: 1970    Action: Complete sections as appropriate. Any discrepancy results in a HARD COPY until resolved.     PRE PROCEDURE:  Patient ID verified with 2 identifiers (name and  or MRN): Yes  Procedure and site verified with patient/designee (when able): Yes  Accurate consent documentation in medical record: Yes  H&P (or appropriate assessment) documented in medical record: NA  H&P must be up to 20 days prior to procedure and updates within 24 hours of procedure as applicable: NA  Relevant diagnostic and radiology test results appropriately labeled and displayed as applicable: Yes  Procedure site(s) marked with provider initials: Yes    TIMEOUT:  Time-Out performed immediately prior to starting procedure, including verbal and active participation of all team members addressing the following:Yes  * Correct patient identify  * Confirmed that the correct side and site are  marked  * An accurate procedure consent form  * Agreement on the procedure to be done  * Correct patient position  * Relevant images and results are properly labeled and appropriately displayed  * The need to administer antibiotics or fluids for irrigation purposes during the procedure as applicable   * Safety precautions based on patient history or medication use    DURING PROCEDURE: Verification of correct person, site, and procedures any time the responsibility for care of the patient is transferred to another member of the care team.       The following medications were given:         Prior to injection, verified patient identity using patient's name and date of birth.  Due to injection administration, patient instructed to remain in clinic for 15 minutes  afterwards, and to report any adverse reaction to me immediately.    Joint injection was performed.    Medication Name: lidocaine NDC 17746-437-26  Drug Amount Wasted:  Yes: 3 mg/ml   Vial/Syringe: Single dose vial  Expiration Date:  11/23    Medication Name Mariposa NDC [address]-1    Scribed by Alida Singh ATC for Romina Hartman on July 27, 2020 at 1205 based on the provider's   statements to me.     Alida Singh ATC

## 2020-07-27 NOTE — LETTER
2020         RE: Aydin Aldridge  [address]  [place] [address]        Dear Colleague,    Thank you for referring your patient, Aydin Aldridge, to the [facility]. Please see a copy of my visit note below.    Large Joint Injection/Arthocentesis: R knee joint    Date/Time: 2020 12:02 PM  Performed by: Romina Hartman APRN CNP  Authorized by: Romina Hartman APRN CNP     Indications:  Pain  Needle Size:  25 G  Guidance: landmark guided    Location:  Knee      Medications:  40 mg triamcinolone 40 MG/ML; 2 mL lidocaine (PF) 1 %  Outcome:  Tolerated well, no immediate complications  Procedure discussed: discussed risks, benefits, and alternatives    Consent Given by:  Patient  Timeout: timeout called immediately prior to procedure    Prep: patient was prepped and draped in usual sterile fashion        [facility]  [address] [address]  [phone]  Dept: [phone]  ______________________________________________________________________________    Patient: Aydin Aldridge   : 1970   MRN: [id number]   2020    INVASIVE PROCEDURE SAFETY CHECKLIST    Date: 2020   Procedure: right knee cortisone injetion   Patient Name: Aydin Aldridge  MRN: [id number]  YOB: 1970    Action: Complete sections as appropriate. Any discrepancy results in a HARD COPY until resolved.     PRE PROCEDURE:  Patient ID verified with 2 identifiers (name and  or MRN): Yes  Procedure and site verified with patient/designee (when able): Yes  Accurate consent documentation in medical record: Yes  H&P (or appropriate assessment) documented in medical record: NA  H&P must be up to 20 days prior to procedure and updates within 24 hours of procedure as applicable: NA  Relevant diagnostic and radiology test results appropriately labeled and displayed as applicable: Yes  Procedure site(s) marked with provider  initials: Yes    TIMEOUT:  Time-Out performed immediately prior to starting procedure, including verbal and active participation of all team members addressing the following:Yes  * Correct patient identify  * Confirmed that the correct side and site are marked  * An accurate procedure consent form  * Agreement on the procedure to be done  * Correct patient position  * Relevant images and results are properly labeled and appropriately displayed  * The need to administer antibiotics or fluids for irrigation purposes during the procedure as applicable   * Safety precautions based on patient history or medication use    DURING PROCEDURE: Verification of correct person, site, and procedures any time the responsibility for care of the patient is transferred to another member of the care team.       The following medications were given:         Prior to injection, verified patient identity using patient's name and date of birth.  Due to injection administration, patient instructed to remain in clinic for 15 minutes  afterwards, and to report any adverse reaction to me immediately.    Joint injection was performed.    Medication Name: lidocaine NDC 48073-219-65  Drug Amount Wasted:  Yes: 3 mg/ml   Vial/Syringe: Single dose vial  Expiration Date:  11/23    Medication Name Mariposa NDC [address]-4    Scribed by Ailda Singh ATC for Romina Hartman on July 27, 2020 at 1205 based on the provider's   statements to me.     Alida Singh ATC          Procedure note     Preprocedure diagnosis: Right knee osteoarthritis     Post procedure diagnosis: Right knee osteoarthritis     Procedure:     Informed consent was obtained.  Using sterile technique, the anterior lateral aspect of the right knee was prepped with chlorhexidine x2.  1 cc of 40 mg of Kenalog and 2 cc of lidocaine were injected in the lateral joint space without difficulty.  Patient tolerated procedure well.     Again, thank you for allowing me to participate in the  care of your patient.        Sincerely,        GENEVIEVE Sykes CNP

## 2020-07-27 NOTE — NURSING NOTE
Reason For Visit:   Chief Complaint   Patient presents with     RECHECK     right knee injection and would like to discuss left knee again        There were no vitals taken for this visit.    Pain Assessment  Patient Currently in Pain: Yes  Primary Pain Location: Knee    Alida Singh ATC

## 2020-07-27 NOTE — PROGRESS NOTES
Procedure note     Preprocedure diagnosis: Right knee osteoarthritis     Post procedure diagnosis: Right knee osteoarthritis     Procedure:     Informed consent was obtained.  Using sterile technique, the anterior lateral aspect of the right knee was prepped with chlorhexidine x2.  1 cc of 40 mg of Kenalog and 2 cc of lidocaine were injected in the lateral joint space without difficulty.  Patient tolerated procedure well.

## 2020-07-27 NOTE — NURSING NOTE
Teaching Flowsheet   Relevant Diagnosis: right knee osteoarthritis  Teaching Topic: preop right total knee arthroplasty    Aydin lives with his wife and his mother in [place], health history includes hypertension on medications, would like to plan his TKA six weeks after his mother Yuly James' TKA since he helps with his mother's cares.     Person(s) involved in teaching:   Patient and Mother     Motivation Level:  Asks Questions: Yes  Eager to Learn: Yes  Cooperative: Yes  Receptive (willing/able to accept information): Yes  Any cultural factors/Jain beliefs that may influence understanding or compliance? No  Comments:      Patient and Family demonstrates understanding of the following:  Reason for the appointment, diagnosis and treatment plan: Yes  Knowledge of proper use of medications and conditions for which they are ordered (with special attention to potential side effects or drug interactions): Yes  Which situations necessitate calling provider and whom to contact: Yes       Teaching Concerns Addressed:   Comments:      Proper use and care of CPM (medical equip, care aids, etc.): Yes  Nutritional needs and diet plan: Yes  Pain management techniques: Yes  Wound Care: Yes  How and/when to access community resources: NA     Instructional Materials Used/Given: preop packet, Total joint book, total joint class trifold, antiseptic soap,  antibiotics before dental reminder card,     Time spent with patient: 15 minutes.

## 2020-07-28 ENCOUNTER — AMBULATORY - HEALTHEAST (OUTPATIENT)
Dept: ENDOCRINOLOGY | Facility: CLINIC | Age: 50
End: 2020-07-28

## 2020-07-28 DIAGNOSIS — E89.0 H/O PARTIAL THYROIDECTOMY: ICD-10-CM

## 2020-09-17 ENCOUNTER — AMBULATORY - HEALTHEAST (OUTPATIENT)
Dept: LAB | Facility: CLINIC | Age: 50
End: 2020-09-17

## 2020-09-17 DIAGNOSIS — E89.0 H/O PARTIAL THYROIDECTOMY: ICD-10-CM

## 2020-09-17 LAB — T4 FREE SERPL-MCNC: 1 NG/DL (ref 0.7–1.8)

## 2020-09-22 ENCOUNTER — OFFICE VISIT - HEALTHEAST (OUTPATIENT)
Dept: ENDOCRINOLOGY | Facility: CLINIC | Age: 50
End: 2020-09-22

## 2020-09-22 DIAGNOSIS — E04.1 THYROID NODULE: ICD-10-CM

## 2020-09-24 ENCOUNTER — HOSPITAL ENCOUNTER (OUTPATIENT)
Dept: ULTRASOUND IMAGING | Facility: HOSPITAL | Age: 50
Discharge: HOME | End: 2020-09-24

## 2020-09-24 ENCOUNTER — COMMUNICATION - HEALTHEAST (OUTPATIENT)
Dept: ENDOCRINOLOGY | Facility: CLINIC | Age: 50
End: 2020-09-24

## 2020-09-24 DIAGNOSIS — E04.1 THYROID NODULE: ICD-10-CM

## 2020-09-25 DIAGNOSIS — M17.31 POST-TRAUMATIC OSTEOARTHRITIS OF RIGHT KNEE: Primary | ICD-10-CM

## 2020-09-28 ENCOUNTER — OFFICE VISIT (OUTPATIENT)
Dept: ORTHOPEDICS | Facility: CLINIC | Age: 50
End: 2020-09-28
Payer: COMMERCIAL

## 2020-09-28 ENCOUNTER — PREP FOR PROCEDURE (OUTPATIENT)
Dept: ORTHOPEDICS | Facility: CLINIC | Age: 50
End: 2020-09-28

## 2020-09-28 ENCOUNTER — ANCILLARY PROCEDURE (OUTPATIENT)
Dept: GENERAL RADIOLOGY | Facility: CLINIC | Age: 50
End: 2020-09-28
Attending: ORTHOPAEDIC SURGERY
Payer: COMMERCIAL

## 2020-09-28 VITALS — HEIGHT: 75 IN | BODY MASS INDEX: 34.32 KG/M2 | WEIGHT: 276 LBS

## 2020-09-28 DIAGNOSIS — M17.31 POST-TRAUMATIC OSTEOARTHRITIS OF ONE KNEE, RIGHT: Primary | ICD-10-CM

## 2020-09-28 DIAGNOSIS — M17.31 POST-TRAUMATIC OSTEOARTHRITIS OF RIGHT KNEE: ICD-10-CM

## 2020-09-28 ASSESSMENT — MIFFLIN-ST. JEOR: SCORE: 2204.43

## 2020-09-28 NOTE — NURSING NOTE
"Reason For Visit:   Chief Complaint   Patient presents with     RECHECK     discuss right knee surgery // wants to discuss left knee pain as well       Ht 1.9 m (6' 2.8\")   Wt 125.2 kg (276 lb)   BMI 34.68 kg/m      Pain Assessment  Patient Currently in Pain: Yes  0-10 Pain Scale: 5  Primary Pain Location: Knee(bilateral knees)    Claudine Preston ATC    "

## 2020-09-28 NOTE — LETTER
"9/28/2020       RE: Aydin Aldridge  [address]  [facility] [address]    Dear Colleague,    Thank you for referring your patient, Aydin Aldridge, to the [facility]. Please see a copy of my visit note below.    CC: \"right knee pain\"    Aydin is seen today as a well known patient to myself with worsening right knee pain s/p femoral lengthening procedure. He has failed conservative management including therapy, nsaids, injections, bracing, and activity modifications and is ready for his \"knee replacement\".    All xrays were reviewed by myself today with radiologist read as below:    Impression:  1. Right mechanical axis angle measures 0 degrees and left measures 2  degrees.  2. Weight bearing axis as noted above.  3. Osteoarthritis of knee most predominantly medial compartment of the  right knee. Distal right femoral posttraumatic deformity with  trochlear alteration, not well detailed by radiographs.     4. Right lower extremity measures approximately 91.4 cm, left 93.8 cm.    Dx:  1. Right knee advanced, grade IV, bone on bone post traumatic osteoarthritis    Plan:  1. The natural progression and plan of care for total knee replacements were discussed including stiffness, infection, blood clots, nerve palsy and continued pain. Informed consent was obtained and teaching and preop materials were given to patient.    Total time spent was 30 minutes with greater than 50% spent in face to face consultation and collaboration of care.    Sincerely,      Tyrese Chang MD  "

## 2020-09-28 NOTE — NURSING NOTE
Aydin states he still has his preop pkt from when surgery ws planned earlier.  He has watched the joint class, and we reviewed H&P, COVID test, NPO, and showers.  Pt verbalized understanding.  Tanvi Neil RN

## 2020-09-30 ENCOUNTER — OFFICE VISIT - HEALTHEAST (OUTPATIENT)
Dept: CARDIOLOGY | Facility: CLINIC | Age: 50
End: 2020-09-30

## 2020-09-30 DIAGNOSIS — I10 ESSENTIAL HYPERTENSION WITH GOAL BLOOD PRESSURE LESS THAN 130/85: ICD-10-CM

## 2020-10-01 NOTE — PROGRESS NOTES
"CC: \"right knee pain\"    Aydin is seen today as a well known patient to myself with worsening right knee pain s/p femoral lengthening procedure. He has failed conservative management including therapy, nsaids, injections, bracing, and activity modifications and is ready for his \"knee replacement\".    All xrays were reviewed by myself today with radiologist read as below:    Impression:  1. Right mechanical axis angle measures 0 degrees and left measures 2  degrees.  2. Weight bearing axis as noted above.  3. Osteoarthritis of knee most predominantly medial compartment of the  right knee. Distal right femoral posttraumatic deformity with  trochlear alteration, not well detailed by radiographs.     4. Right lower extremity measures approximately 91.4 cm, left 93.8 cm.    Dx:  1. Right knee advanced, grade IV, bone on bone post traumatic osteoarthritis    Plan:  1. The natural progression and plan of care for total knee replacements were discussed including stiffness, infection, blood clots, nerve palsy and continued pain. Informed consent was obtained and teaching and preop materials were given to patient.    Total time spent was 30 minutes with greater than 50% spent in face to face consultation and collaboration of care.  "
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
## 2020-10-02 ENCOUNTER — OFFICE VISIT - HEALTHEAST (OUTPATIENT)
Dept: FAMILY MEDICINE | Facility: CLINIC | Age: 50
End: 2020-10-02

## 2020-10-02 DIAGNOSIS — H91.91 DECREASED HEARING OF RIGHT EAR: ICD-10-CM

## 2020-10-02 DIAGNOSIS — Z01.818 PRE-OP EVALUATION: ICD-10-CM

## 2020-10-02 DIAGNOSIS — L98.9 BENIGN SKIN LESION OF MULTIPLE SITES: ICD-10-CM

## 2020-10-02 DIAGNOSIS — I10 ESSENTIAL HYPERTENSION: ICD-10-CM

## 2020-10-02 DIAGNOSIS — H93.11 TINNITUS, RIGHT: ICD-10-CM

## 2020-10-02 DIAGNOSIS — M17.31 POST-TRAUMATIC OSTEOARTHRITIS OF RIGHT KNEE: ICD-10-CM

## 2020-10-02 DIAGNOSIS — M17.2 BILATERAL POST-TRAUMATIC OSTEOARTHRITIS OF KNEE: Primary | ICD-10-CM

## 2020-10-02 LAB
ANION GAP SERPL CALCULATED.3IONS-SCNC: 14 MMOL/L (ref 5–18)
BASOPHILS # BLD AUTO: 0.1 THOU/UL (ref 0–0.2)
BASOPHILS NFR BLD AUTO: 1 % (ref 0–2)
BUN SERPL-MCNC: 13 MG/DL (ref 8–22)
CALCIUM SERPL-MCNC: 9.7 MG/DL (ref 8.5–10.5)
CHLORIDE BLD-SCNC: 98 MMOL/L (ref 98–107)
CO2 SERPL-SCNC: 28 MMOL/L (ref 22–31)
CREAT SERPL-MCNC: 1.19 MG/DL (ref 0.7–1.3)
EOSINOPHIL # BLD AUTO: 0.2 THOU/UL (ref 0–0.4)
EOSINOPHIL NFR BLD AUTO: 3 % (ref 0–6)
ERYTHROCYTE [DISTWIDTH] IN BLOOD BY AUTOMATED COUNT: 13.1 % (ref 11–14.5)
GFR SERPL CREATININE-BSD FRML MDRD: >60 ML/MIN/1.73M2
GLUCOSE BLD-MCNC: 112 MG/DL (ref 70–125)
HCT VFR BLD AUTO: 44.9 % (ref 40–54)
HGB BLD-MCNC: 15.3 G/DL (ref 14–18)
IMM GRANULOCYTES # BLD: 0 THOU/UL
IMM GRANULOCYTES NFR BLD: 0 %
LYMPHOCYTES # BLD AUTO: 1.8 THOU/UL (ref 0.8–4.4)
LYMPHOCYTES NFR BLD AUTO: 20 % (ref 20–40)
MCH RBC QN AUTO: 30.4 PG (ref 27–34)
MCHC RBC AUTO-ENTMCNC: 34.1 G/DL (ref 32–36)
MCV RBC AUTO: 89 FL (ref 80–100)
MONOCYTES # BLD AUTO: 0.6 THOU/UL (ref 0–0.9)
MONOCYTES NFR BLD AUTO: 7 % (ref 2–10)
NEUTROPHILS # BLD AUTO: 6.1 THOU/UL (ref 2–7.7)
NEUTROPHILS NFR BLD AUTO: 69 % (ref 50–70)
PLATELET # BLD AUTO: 315 THOU/UL (ref 140–440)
PMV BLD AUTO: 10.2 FL (ref 8.5–12.5)
POTASSIUM BLD-SCNC: 3.7 MMOL/L (ref 3.5–5)
RBC # BLD AUTO: 5.04 MILL/UL (ref 4.4–6.2)
SODIUM SERPL-SCNC: 140 MMOL/L (ref 136–145)
WBC: 8.8 THOU/UL (ref 4–11)

## 2020-10-02 ASSESSMENT — MIFFLIN-ST. JEOR: SCORE: 2197.11

## 2020-10-06 ENCOUNTER — COMMUNICATION - HEALTHEAST (OUTPATIENT)
Dept: FAMILY MEDICINE | Facility: CLINIC | Age: 50
End: 2020-10-06

## 2020-10-12 ENCOUNTER — AMBULATORY - HEALTHEAST (OUTPATIENT)
Dept: LAB | Facility: CLINIC | Age: 50
End: 2020-10-12

## 2020-10-12 DIAGNOSIS — Z11.59 ENCOUNTER FOR SCREENING FOR OTHER VIRAL DISEASES: ICD-10-CM

## 2020-10-14 ENCOUNTER — ANESTHESIA EVENT (OUTPATIENT)
Dept: SURGERY | Facility: CLINIC | Age: 50
End: 2020-10-14
Payer: COMMERCIAL

## 2020-10-14 ENCOUNTER — COMMUNICATION - HEALTHEAST (OUTPATIENT)
Dept: SCHEDULING | Facility: CLINIC | Age: 50
End: 2020-10-14

## 2020-10-14 NOTE — ANESTHESIA PREPROCEDURE EVALUATION
"Anesthesia Pre-Procedure Evaluation    Patient: Aydin Aldridge   MRN:     [id number] Gender:   male   Age:    50 year old :      1970        Preoperative Diagnosis: Post-traumatic osteoarthritis of right knee [M17.31]   Procedure(s):  Right total knee arthroplasty  INJECTION, STEROID LEFT KNEE     LABS:  CBC:   Lab Results   Component Value Date    WBC 7.8 2018    HGB 15.0 2018    HCT 43.5 2018     2018     BMP: No results found for: NA, POTASSIUM, CHLORIDE, CO2, BUN, CR, GLC  COAGS: No results found for: PTT, INR, FIBR  POC: No results found for: BGM, HCG, HCGS  OTHER:   Lab Results   Component Value Date    CRP 6.4 2018    SED 6 2018        Preop Vitals    BP Readings from Last 3 Encounters:   04 140/76   04 138/82   03 132/90    Pulse Readings from Last 3 Encounters:   04 84   04 84   03 62      Resp Readings from Last 3 Encounters:   No data found for Resp    SpO2 Readings from Last 3 Encounters:   No data found for SpO2      Temp Readings from Last 1 Encounters:   04 37.3  C (99.1  F) (Oral)    Ht Readings from Last 1 Encounters:   20 1.9 m (6' 2.8\")      Wt Readings from Last 1 Encounters:   20 125.2 kg (276 lb)    Estimated body mass index is 34.68 kg/m  as calculated from the following:    Height as of 20: 1.9 m (6' 2.8\").    Weight as of 20: 125.2 kg (276 lb).     LDA:        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       Past Surgical History:   Procedure Laterality Date     C OPEN RX ANKLE DISLOCATN+FIXATN        Allergies   Allergen Reactions     Nickel Rash        Anesthesia Evaluation     . Pt has had prior anesthetic. Type: Regional and MAC    No history of anesthetic complications          ROS/MED HX    ENT/Pulmonary:     (+)TIMOTEO risk factors obese, , . .    Neurologic:     (+)neuropathy - Sciatica, hx of numbness right " knee, migraines,     Cardiovascular:     (+) hypertension----. : . . . :. .       METS/Exercise Tolerance:     Hematologic:  - neg hematologic  ROS       Musculoskeletal:   (+) arthritis,  other musculoskeletal- Hx low back injury      GI/Hepatic:     (+) GERD Asymptomatic on medication,       Renal/Genitourinary:  - ROS Renal section negative       Endo:     (+) thyroid problem (nodule) .      Psychiatric:  - neg psychiatric ROS       Infectious Disease: Comment: covid negative 10/12 - neg infectious disease ROS       Malignancy:      - no malignancy   Other:    (+) H/O Chronic Pain,  - neg other ROS                     PHYSICAL EXAM:   Mental Status/Neuro: A/A/O   Airway: Facies: Feasible  Mallampati: Not Assessed  Mouth/Opening: Full  TM distance: > 6 cm  Neck ROM: Full   Respiratory: Auscultation: CTAB     Resp. Rate: Normal     Resp. Effort: Normal      CV: Rhythm: Regular  Rate: Age appropriate  Heart: Normal Sounds  Edema: None   Comments:      Dental: Normal Dentition                Assessment:   ASA SCORE: 2    H&P: History and physical reviewed and following examination; no interval change.   Smoking Status:  Non-Smoker/Unknown        Plan:   Anes. Type:  Spinal; MAC; Peripheral Nerve Block; For Post-op pain in coordination with surgeon     Block Details: Adductor C.   Pre-Medication: None   Induction:  N/a   Airway: Native Airway   Access/Monitoring: PIV   Maintenance: N/a     Postop Plan:   Postop Pain: Regional  Postop Sedation/Airway: Not planned  Disposition: Outpatient     PONV Management:   Adult Risk Factors:, Non-Smoker   Prevention: Ondansetron     CONSENT: Direct conversation   Plan and risks discussed with: Patient   Blood Products: Consent Deferred (Minimal Blood Loss)       Comments for Plan/Consent:  51 yo for  Right total knee arthroplasty under SPINAL/MAC/RA.  Anesthesia risks and benefits discussed. Questions answered. Patient understands and agrees to proceed with anesthesia plan.                     Bry Woo, DO

## 2020-10-15 ENCOUNTER — HOSPITAL ENCOUNTER (OUTPATIENT)
Facility: CLINIC | Age: 50
Discharge: HOME | End: 2020-10-16
Attending: ORTHOPAEDIC SURGERY | Admitting: ORTHOPAEDIC SURGERY
Payer: COMMERCIAL

## 2020-10-15 ENCOUNTER — APPOINTMENT (OUTPATIENT)
Dept: PHYSICAL THERAPY | Facility: CLINIC | Age: 50
End: 2020-10-15
Attending: ORTHOPAEDIC SURGERY
Payer: COMMERCIAL

## 2020-10-15 ENCOUNTER — APPOINTMENT (OUTPATIENT)
Dept: GENERAL RADIOLOGY | Facility: CLINIC | Age: 50
End: 2020-10-15
Attending: ORTHOPAEDIC SURGERY
Payer: COMMERCIAL

## 2020-10-15 ENCOUNTER — ANESTHESIA (OUTPATIENT)
Dept: SURGERY | Facility: CLINIC | Age: 50
End: 2020-10-15
Payer: COMMERCIAL

## 2020-10-15 DIAGNOSIS — Z96.651 S/P TKR (TOTAL KNEE REPLACEMENT), RIGHT: Primary | ICD-10-CM

## 2020-10-15 DIAGNOSIS — M17.31 POST-TRAUMATIC OSTEOARTHRITIS OF RIGHT KNEE: ICD-10-CM

## 2020-10-15 DIAGNOSIS — M17.2 POST-TRAUMATIC OSTEOARTHRITIS OF BOTH KNEES: ICD-10-CM

## 2020-10-15 DIAGNOSIS — Z98.890 STATUS POST KNEE SURGERY: Primary | ICD-10-CM

## 2020-10-15 LAB
ABO + RH BLD: NORMAL
ABO + RH BLD: NORMAL
BLD GP AB SCN SERPL QL: NORMAL
BLOOD BANK CMNT PATIENT-IMP: NORMAL
GLUCOSE SERPL-MCNC: 105 MG/DL (ref 70–99)
HGB BLD-MCNC: 14.5 G/DL (ref 13.3–17.7)
POTASSIUM SERPL-SCNC: 3.4 MMOL/L (ref 3.4–5.3)
SPECIMEN EXP DATE BLD: NORMAL

## 2020-10-15 PROCEDURE — 250N000013 HC RX MED GY IP 250 OP 250 PS 637: Performed by: ANESTHESIOLOGY

## 2020-10-15 PROCEDURE — 99225 PR SUBSEQUENT OBSERVATION CARE,LEVEL II: CPT | Performed by: HOSPITALIST

## 2020-10-15 PROCEDURE — 250N000013 HC RX MED GY IP 250 OP 250 PS 637: Performed by: ORTHOPAEDIC SURGERY

## 2020-10-15 PROCEDURE — 250N000011 HC RX IP 250 OP 636: Performed by: ORTHOPAEDIC SURGERY

## 2020-10-15 PROCEDURE — 250N000013 HC RX MED GY IP 250 OP 250 PS 637: Performed by: NURSE PRACTITIONER

## 2020-10-15 PROCEDURE — 73560 X-RAY EXAM OF KNEE 1 OR 2: CPT | Mod: 26 | Performed by: RADIOLOGY

## 2020-10-15 PROCEDURE — 250N000013 HC RX MED GY IP 250 OP 250 PS 637: Performed by: STUDENT IN AN ORGANIZED HEALTH CARE EDUCATION/TRAINING PROGRAM

## 2020-10-15 PROCEDURE — C1776 JOINT DEVICE (IMPLANTABLE): HCPCS | Performed by: ORTHOPAEDIC SURGERY

## 2020-10-15 PROCEDURE — 99207 PR CDG-CODE CATEGORY CHANGED: CPT | Performed by: HOSPITALIST

## 2020-10-15 PROCEDURE — 36415 COLL VENOUS BLD VENIPUNCTURE: CPT | Performed by: ANESTHESIOLOGY

## 2020-10-15 PROCEDURE — 370N000002 HC ANESTHESIA TECHNICAL FEE, EACH ADDTL 15 MIN: Performed by: ORTHOPAEDIC SURGERY

## 2020-10-15 PROCEDURE — 360N000040 HC SURGERY LEVEL 6 1ST 30 MIN - UMMC: Performed by: ORTHOPAEDIC SURGERY

## 2020-10-15 PROCEDURE — 370N000001 HC ANESTHESIA TECHNICAL FEE, 1ST 30 MIN: Performed by: ORTHOPAEDIC SURGERY

## 2020-10-15 PROCEDURE — 258N000003 HC RX IP 258 OP 636: Performed by: ORTHOPAEDIC SURGERY

## 2020-10-15 PROCEDURE — 97530 THERAPEUTIC ACTIVITIES: CPT | Mod: GP | Performed by: PHYSICAL THERAPIST

## 2020-10-15 PROCEDURE — 97110 THERAPEUTIC EXERCISES: CPT | Mod: GP | Performed by: PHYSICAL THERAPIST

## 2020-10-15 PROCEDURE — 250N000011 HC RX IP 250 OP 636: Performed by: ANESTHESIOLOGY

## 2020-10-15 PROCEDURE — 250N000011 HC RX IP 250 OP 636: Performed by: STUDENT IN AN ORGANIZED HEALTH CARE EDUCATION/TRAINING PROGRAM

## 2020-10-15 PROCEDURE — 272N000001 HC OR GENERAL SUPPLY STERILE: Performed by: ORTHOPAEDIC SURGERY

## 2020-10-15 PROCEDURE — 999N000140 HC STATISTIC PRE-PROCEDURE ASSESSMENT III: Performed by: ORTHOPAEDIC SURGERY

## 2020-10-15 PROCEDURE — 250N000009 HC RX 250: Performed by: STUDENT IN AN ORGANIZED HEALTH CARE EDUCATION/TRAINING PROGRAM

## 2020-10-15 PROCEDURE — 86900 BLOOD TYPING SEROLOGIC ABO: CPT | Performed by: ANESTHESIOLOGY

## 2020-10-15 PROCEDURE — 82947 ASSAY GLUCOSE BLOOD QUANT: CPT | Performed by: ANESTHESIOLOGY

## 2020-10-15 PROCEDURE — 97161 PT EVAL LOW COMPLEX 20 MIN: CPT | Mod: GP | Performed by: PHYSICAL THERAPIST

## 2020-10-15 PROCEDURE — 86850 RBC ANTIBODY SCREEN: CPT | Performed by: ANESTHESIOLOGY

## 2020-10-15 PROCEDURE — 761N000003 HC RECOVERY PHASE 1 LEVEL 2 FIRST HR: Performed by: ORTHOPAEDIC SURGERY

## 2020-10-15 PROCEDURE — 999N000065 XR KNEE PORT RT 1/2 VW: Mod: RT

## 2020-10-15 PROCEDURE — 84132 ASSAY OF SERUM POTASSIUM: CPT | Performed by: ANESTHESIOLOGY

## 2020-10-15 PROCEDURE — 258N000001 HC RX 258: Performed by: ORTHOPAEDIC SURGERY

## 2020-10-15 PROCEDURE — 85018 HEMOGLOBIN: CPT | Performed by: ANESTHESIOLOGY

## 2020-10-15 PROCEDURE — 250N000009 HC RX 250: Performed by: ORTHOPAEDIC SURGERY

## 2020-10-15 PROCEDURE — 250N000011 HC RX IP 250 OP 636: Performed by: NURSE PRACTITIONER

## 2020-10-15 PROCEDURE — 761N000004 HC RECOVERY PHASE 1 LEVEL 2 EA ADDTL HR: Performed by: ORTHOPAEDIC SURGERY

## 2020-10-15 PROCEDURE — 86901 BLOOD TYPING SEROLOGIC RH(D): CPT | Performed by: ANESTHESIOLOGY

## 2020-10-15 PROCEDURE — 278N000051 HC OR IMPLANT GENERAL: Performed by: ORTHOPAEDIC SURGERY

## 2020-10-15 PROCEDURE — 360N000041 HC SURGERY LEVEL 6 EA 15 ADDTL MIN - UMMC: Performed by: ORTHOPAEDIC SURGERY

## 2020-10-15 PROCEDURE — 258N000003 HC RX IP 258 OP 636: Performed by: STUDENT IN AN ORGANIZED HEALTH CARE EDUCATION/TRAINING PROGRAM

## 2020-10-15 DEVICE — POSTERIOR STABILIZED FEMORAL
Type: IMPLANTABLE DEVICE | Site: KNEE | Status: FUNCTIONAL
Brand: TRIATHLON

## 2020-10-15 DEVICE — UNIVERSAL TIBIAL BASEPLATE
Type: IMPLANTABLE DEVICE | Site: KNEE | Status: FUNCTIONAL
Brand: TRIATHLON

## 2020-10-15 DEVICE — PATELLA
Type: IMPLANTABLE DEVICE | Site: KNEE | Status: FUNCTIONAL
Brand: TRIATHLON

## 2020-10-15 DEVICE — BONE CEMENT SIMPLEX W/TOBRAMYCIN 6197-9-001: Type: IMPLANTABLE DEVICE | Site: KNEE | Status: FUNCTIONAL

## 2020-10-15 DEVICE — TIBIAL BEARING INSERT - PS
Type: IMPLANTABLE DEVICE | Site: KNEE | Status: FUNCTIONAL
Brand: TRIATHLON

## 2020-10-15 RX ORDER — CEFAZOLIN SODIUM 2 G/100ML
2 INJECTION, SOLUTION INTRAVENOUS EVERY 8 HOURS
Status: COMPLETED | OUTPATIENT
Start: 2020-10-15 | End: 2020-10-16

## 2020-10-15 RX ORDER — DOCUSATE SODIUM 100 MG/1
100 CAPSULE, LIQUID FILLED ORAL 2 TIMES DAILY
Status: DISCONTINUED | OUTPATIENT
Start: 2020-10-15 | End: 2020-10-16 | Stop reason: HOSPADM

## 2020-10-15 RX ORDER — NALOXONE HYDROCHLORIDE 0.4 MG/ML
.1-.4 INJECTION, SOLUTION INTRAMUSCULAR; INTRAVENOUS; SUBCUTANEOUS
Status: DISCONTINUED | OUTPATIENT
Start: 2020-10-15 | End: 2020-10-15 | Stop reason: HOSPADM

## 2020-10-15 RX ORDER — TRIAMCINOLONE ACETONIDE 40 MG/ML
INJECTION, SUSPENSION INTRA-ARTICULAR; INTRAMUSCULAR PRN
Status: DISCONTINUED | OUTPATIENT
Start: 2020-10-15 | End: 2020-10-15 | Stop reason: HOSPADM

## 2020-10-15 RX ORDER — FENTANYL CITRATE 50 UG/ML
25-50 INJECTION, SOLUTION INTRAMUSCULAR; INTRAVENOUS
Status: DISCONTINUED | OUTPATIENT
Start: 2020-10-15 | End: 2020-10-15 | Stop reason: HOSPADM

## 2020-10-15 RX ORDER — ACETAMINOPHEN 325 MG/1
650 TABLET ORAL EVERY 4 HOURS PRN
Status: DISCONTINUED | OUTPATIENT
Start: 2020-10-18 | End: 2020-10-16 | Stop reason: HOSPADM

## 2020-10-15 RX ORDER — ASPIRIN 81 MG/1
162 TABLET ORAL DAILY
Qty: 60 TABLET | Refills: 0 | Status: SHIPPED | OUTPATIENT
Start: 2020-10-15

## 2020-10-15 RX ORDER — ONDANSETRON 2 MG/ML
4 INJECTION INTRAMUSCULAR; INTRAVENOUS EVERY 30 MIN PRN
Status: DISCONTINUED | OUTPATIENT
Start: 2020-10-15 | End: 2020-10-15 | Stop reason: HOSPADM

## 2020-10-15 RX ORDER — KETOROLAC TROMETHAMINE 30 MG/ML
15 INJECTION, SOLUTION INTRAMUSCULAR; INTRAVENOUS EVERY 6 HOURS
Status: DISCONTINUED | OUTPATIENT
Start: 2020-10-15 | End: 2020-10-16

## 2020-10-15 RX ORDER — CYCLOBENZAPRINE HCL 10 MG
10 TABLET ORAL EVERY 8 HOURS PRN
Status: DISCONTINUED | OUTPATIENT
Start: 2020-10-15 | End: 2020-10-16 | Stop reason: HOSPADM

## 2020-10-15 RX ORDER — AMOXICILLIN 250 MG
1-2 CAPSULE ORAL 2 TIMES DAILY
Qty: 30 TABLET | Refills: 0 | Status: SHIPPED | OUTPATIENT
Start: 2020-10-15 | End: 2021-12-10

## 2020-10-15 RX ORDER — OMEPRAZOLE 40 MG/1
40 CAPSULE, DELAYED RELEASE ORAL DAILY
COMMUNITY
End: 2021-10-08

## 2020-10-15 RX ORDER — TRANEXAMIC ACID 650 MG/1
1950 TABLET ORAL ONCE
Status: COMPLETED | OUTPATIENT
Start: 2020-10-15 | End: 2020-10-15

## 2020-10-15 RX ORDER — HYDROMORPHONE HYDROCHLORIDE 1 MG/ML
0.2 INJECTION, SOLUTION INTRAMUSCULAR; INTRAVENOUS; SUBCUTANEOUS
Status: DISCONTINUED | OUTPATIENT
Start: 2020-10-15 | End: 2020-10-16 | Stop reason: HOSPADM

## 2020-10-15 RX ORDER — ONDANSETRON 4 MG/1
4 TABLET, ORALLY DISINTEGRATING ORAL EVERY 6 HOURS PRN
Status: DISCONTINUED | OUTPATIENT
Start: 2020-10-15 | End: 2020-10-16 | Stop reason: HOSPADM

## 2020-10-15 RX ORDER — FLUMAZENIL 0.1 MG/ML
0.2 INJECTION, SOLUTION INTRAVENOUS
Status: DISCONTINUED | OUTPATIENT
Start: 2020-10-15 | End: 2020-10-15 | Stop reason: HOSPADM

## 2020-10-15 RX ORDER — HYDROMORPHONE HYDROCHLORIDE 1 MG/ML
0.4 INJECTION, SOLUTION INTRAMUSCULAR; INTRAVENOUS; SUBCUTANEOUS
Status: DISCONTINUED | OUTPATIENT
Start: 2020-10-15 | End: 2020-10-16 | Stop reason: HOSPADM

## 2020-10-15 RX ORDER — PROPOFOL 10 MG/ML
INJECTION, EMULSION INTRAVENOUS PRN
Status: DISCONTINUED | OUTPATIENT
Start: 2020-10-15 | End: 2020-10-15

## 2020-10-15 RX ORDER — SODIUM CHLORIDE, SODIUM LACTATE, POTASSIUM CHLORIDE, CALCIUM CHLORIDE 600; 310; 30; 20 MG/100ML; MG/100ML; MG/100ML; MG/100ML
INJECTION, SOLUTION INTRAVENOUS CONTINUOUS
Status: DISCONTINUED | OUTPATIENT
Start: 2020-10-15 | End: 2020-10-16 | Stop reason: HOSPADM

## 2020-10-15 RX ORDER — FAMOTIDINE 20 MG/1
20 TABLET, FILM COATED ORAL 2 TIMES DAILY
Status: DISCONTINUED | OUTPATIENT
Start: 2020-10-15 | End: 2020-10-16 | Stop reason: HOSPADM

## 2020-10-15 RX ORDER — LIDOCAINE 40 MG/G
CREAM TOPICAL
Status: DISCONTINUED | OUTPATIENT
Start: 2020-10-15 | End: 2020-10-16 | Stop reason: HOSPADM

## 2020-10-15 RX ORDER — LOSARTAN POTASSIUM 50 MG/1
50 TABLET ORAL DAILY
COMMUNITY
End: 2021-08-31

## 2020-10-15 RX ORDER — MULTIPLE VITAMINS W/ MINERALS TAB 9MG-400MCG
1 TAB ORAL DAILY
Status: DISCONTINUED | OUTPATIENT
Start: 2020-10-15 | End: 2020-10-16 | Stop reason: HOSPADM

## 2020-10-15 RX ORDER — LOSARTAN POTASSIUM 50 MG/1
50 TABLET ORAL DAILY
Status: DISCONTINUED | OUTPATIENT
Start: 2020-10-15 | End: 2020-10-16 | Stop reason: HOSPADM

## 2020-10-15 RX ORDER — ONDANSETRON 4 MG/1
4 TABLET, ORALLY DISINTEGRATING ORAL EVERY 30 MIN PRN
Status: DISCONTINUED | OUTPATIENT
Start: 2020-10-15 | End: 2020-10-15 | Stop reason: HOSPADM

## 2020-10-15 RX ORDER — SODIUM CHLORIDE, SODIUM LACTATE, POTASSIUM CHLORIDE, CALCIUM CHLORIDE 600; 310; 30; 20 MG/100ML; MG/100ML; MG/100ML; MG/100ML
INJECTION, SOLUTION INTRAVENOUS CONTINUOUS
Status: DISCONTINUED | OUTPATIENT
Start: 2020-10-15 | End: 2020-10-15 | Stop reason: HOSPADM

## 2020-10-15 RX ORDER — PROPOFOL 10 MG/ML
INJECTION, EMULSION INTRAVENOUS CONTINUOUS PRN
Status: DISCONTINUED | OUTPATIENT
Start: 2020-10-15 | End: 2020-10-15

## 2020-10-15 RX ORDER — OXYCODONE HYDROCHLORIDE 10 MG/1
10 TABLET ORAL EVERY 4 HOURS PRN
Status: DISCONTINUED | OUTPATIENT
Start: 2020-10-15 | End: 2020-10-16 | Stop reason: HOSPADM

## 2020-10-15 RX ORDER — CEFAZOLIN SODIUM IN 0.9 % NACL 3 G/100 ML
3 INTRAVENOUS SOLUTION, PIGGYBACK (ML) INTRAVENOUS
Status: COMPLETED | OUTPATIENT
Start: 2020-10-15 | End: 2020-10-15

## 2020-10-15 RX ORDER — NALOXONE HYDROCHLORIDE 0.4 MG/ML
.1-.4 INJECTION, SOLUTION INTRAMUSCULAR; INTRAVENOUS; SUBCUTANEOUS
Status: DISCONTINUED | OUTPATIENT
Start: 2020-10-15 | End: 2020-10-16 | Stop reason: HOSPADM

## 2020-10-15 RX ORDER — POLYETHYLENE GLYCOL 3350 17 G/17G
1 POWDER, FOR SOLUTION ORAL DAILY
Qty: 7 PACKET | Refills: 0 | Status: SHIPPED | OUTPATIENT
Start: 2020-10-15 | End: 2021-12-10

## 2020-10-15 RX ORDER — MULTIVIT-MIN/IRON/FOLIC ACID/K 18-600-40
500 CAPSULE ORAL EVERY MORNING
COMMUNITY

## 2020-10-15 RX ORDER — BISACODYL 10 MG
10 SUPPOSITORY, RECTAL RECTAL DAILY PRN
Status: DISCONTINUED | OUTPATIENT
Start: 2020-10-15 | End: 2020-10-16 | Stop reason: HOSPADM

## 2020-10-15 RX ORDER — POLYETHYLENE GLYCOL 3350 17 G/17G
17 POWDER, FOR SOLUTION ORAL DAILY
Status: DISCONTINUED | OUTPATIENT
Start: 2020-10-16 | End: 2020-10-16 | Stop reason: HOSPADM

## 2020-10-15 RX ORDER — HYDROMORPHONE HYDROCHLORIDE 1 MG/ML
.3-.5 INJECTION, SOLUTION INTRAMUSCULAR; INTRAVENOUS; SUBCUTANEOUS EVERY 10 MIN PRN
Status: DISCONTINUED | OUTPATIENT
Start: 2020-10-15 | End: 2020-10-15 | Stop reason: HOSPADM

## 2020-10-15 RX ORDER — CEFAZOLIN SODIUM 1 G/3ML
1 INJECTION, POWDER, FOR SOLUTION INTRAMUSCULAR; INTRAVENOUS SEE ADMIN INSTRUCTIONS
Status: DISCONTINUED | OUTPATIENT
Start: 2020-10-15 | End: 2020-10-15 | Stop reason: HOSPADM

## 2020-10-15 RX ORDER — ACETAMINOPHEN 325 MG/1
650 TABLET ORAL EVERY 4 HOURS
Qty: 100 TABLET | Refills: 0 | Status: SHIPPED | OUTPATIENT
Start: 2020-10-15 | End: 2023-04-27

## 2020-10-15 RX ORDER — BUPIVACAINE HYDROCHLORIDE 2.5 MG/ML
INJECTION, SOLUTION INFILTRATION; PERINEURAL PRN
Status: DISCONTINUED | OUTPATIENT
Start: 2020-10-15 | End: 2020-10-15 | Stop reason: HOSPADM

## 2020-10-15 RX ORDER — OXYCODONE HCL 5 MG/5 ML
5-10 SOLUTION, ORAL ORAL EVERY 4 HOURS PRN
Status: DISCONTINUED | OUTPATIENT
Start: 2020-10-15 | End: 2020-10-15

## 2020-10-15 RX ORDER — HYDRALAZINE HYDROCHLORIDE 20 MG/ML
10 INJECTION INTRAMUSCULAR; INTRAVENOUS EVERY 4 HOURS PRN
Status: DISCONTINUED | OUTPATIENT
Start: 2020-10-15 | End: 2020-10-16 | Stop reason: HOSPADM

## 2020-10-15 RX ORDER — MEPERIDINE HYDROCHLORIDE 25 MG/ML
12.5 INJECTION INTRAMUSCULAR; INTRAVENOUS; SUBCUTANEOUS
Status: DISCONTINUED | OUTPATIENT
Start: 2020-10-15 | End: 2020-10-15 | Stop reason: HOSPADM

## 2020-10-15 RX ORDER — BUPIVACAINE HYDROCHLORIDE AND EPINEPHRINE 5; 5 MG/ML; UG/ML
INJECTION, SOLUTION PERINEURAL PRN
Status: DISCONTINUED | OUTPATIENT
Start: 2020-10-15 | End: 2020-10-15 | Stop reason: HOSPADM

## 2020-10-15 RX ORDER — ACETAMINOPHEN 325 MG/1
975 TABLET ORAL ONCE
Status: COMPLETED | OUTPATIENT
Start: 2020-10-15 | End: 2020-10-15

## 2020-10-15 RX ORDER — CELECOXIB 200 MG/1
400 CAPSULE ORAL ONCE
Status: COMPLETED | OUTPATIENT
Start: 2020-10-15 | End: 2020-10-15

## 2020-10-15 RX ORDER — BUPIVACAINE HYDROCHLORIDE 2.5 MG/ML
INJECTION, SOLUTION EPIDURAL; INFILTRATION; INTRACAUDAL PRN
Status: DISCONTINUED | OUTPATIENT
Start: 2020-10-15 | End: 2020-10-15

## 2020-10-15 RX ORDER — ONDANSETRON 2 MG/ML
4 INJECTION INTRAMUSCULAR; INTRAVENOUS EVERY 6 HOURS PRN
Status: DISCONTINUED | OUTPATIENT
Start: 2020-10-15 | End: 2020-10-16 | Stop reason: HOSPADM

## 2020-10-15 RX ORDER — MAGNESIUM HYDROXIDE 1200 MG/15ML
LIQUID ORAL PRN
Status: DISCONTINUED | OUTPATIENT
Start: 2020-10-15 | End: 2020-10-15 | Stop reason: HOSPADM

## 2020-10-15 RX ORDER — OXYCODONE HYDROCHLORIDE 5 MG/1
5-10 TABLET ORAL EVERY 4 HOURS PRN
Qty: 30 TABLET | Refills: 0 | Status: SHIPPED | OUTPATIENT
Start: 2020-10-15 | End: 2020-10-16

## 2020-10-15 RX ORDER — OXYCODONE HYDROCHLORIDE 5 MG/1
5 TABLET ORAL EVERY 4 HOURS PRN
Status: DISCONTINUED | OUTPATIENT
Start: 2020-10-15 | End: 2020-10-16 | Stop reason: HOSPADM

## 2020-10-15 RX ORDER — SODIUM CHLORIDE, SODIUM LACTATE, POTASSIUM CHLORIDE, CALCIUM CHLORIDE 600; 310; 30; 20 MG/100ML; MG/100ML; MG/100ML; MG/100ML
INJECTION, SOLUTION INTRAVENOUS CONTINUOUS PRN
Status: DISCONTINUED | OUTPATIENT
Start: 2020-10-15 | End: 2020-10-15

## 2020-10-15 RX ORDER — AMOXICILLIN 250 MG
1 CAPSULE ORAL 2 TIMES DAILY
Status: DISCONTINUED | OUTPATIENT
Start: 2020-10-15 | End: 2020-10-16 | Stop reason: HOSPADM

## 2020-10-15 RX ORDER — ACETAMINOPHEN 325 MG/1
975 TABLET ORAL EVERY 8 HOURS
Status: DISCONTINUED | OUTPATIENT
Start: 2020-10-15 | End: 2020-10-16 | Stop reason: HOSPADM

## 2020-10-15 RX ORDER — ASPIRIN 81 MG/1
162 TABLET ORAL DAILY
Status: DISCONTINUED | OUTPATIENT
Start: 2020-10-16 | End: 2020-10-16 | Stop reason: HOSPADM

## 2020-10-15 RX ORDER — PROCHLORPERAZINE MALEATE 10 MG
10 TABLET ORAL EVERY 6 HOURS PRN
Status: DISCONTINUED | OUTPATIENT
Start: 2020-10-15 | End: 2020-10-16 | Stop reason: HOSPADM

## 2020-10-15 RX ORDER — BUPIVACAINE HYDROCHLORIDE 7.5 MG/ML
INJECTION, SOLUTION INTRASPINAL PRN
Status: DISCONTINUED | OUTPATIENT
Start: 2020-10-15 | End: 2020-10-15

## 2020-10-15 RX ORDER — DEXAMETHASONE SODIUM PHOSPHATE 4 MG/ML
INJECTION, SOLUTION INTRA-ARTICULAR; INTRALESIONAL; INTRAMUSCULAR; INTRAVENOUS; SOFT TISSUE PRN
Status: DISCONTINUED | OUTPATIENT
Start: 2020-10-15 | End: 2020-10-15

## 2020-10-15 RX ADMIN — ONDANSETRON 4 MG: 2 INJECTION INTRAMUSCULAR; INTRAVENOUS at 11:18

## 2020-10-15 RX ADMIN — PHENYLEPHRINE HYDROCHLORIDE 100 MCG: 10 INJECTION INTRAVENOUS at 08:35

## 2020-10-15 RX ADMIN — PROPOFOL 20 MG: 10 INJECTION, EMULSION INTRAVENOUS at 09:46

## 2020-10-15 RX ADMIN — MIDAZOLAM 1 MG: 1 INJECTION INTRAMUSCULAR; INTRAVENOUS at 07:05

## 2020-10-15 RX ADMIN — Medication 3 G: at 07:45

## 2020-10-15 RX ADMIN — CYCLOBENZAPRINE 10 MG: 10 TABLET, FILM COATED ORAL at 22:45

## 2020-10-15 RX ADMIN — PHENYLEPHRINE HYDROCHLORIDE 100 MCG: 10 INJECTION INTRAVENOUS at 08:42

## 2020-10-15 RX ADMIN — FENTANYL CITRATE 50 MCG: 50 INJECTION INTRAMUSCULAR; INTRAVENOUS at 07:05

## 2020-10-15 RX ADMIN — BUPIVACAINE HYDROCHLORIDE 20 ML: 2.5 INJECTION, SOLUTION EPIDURAL; INFILTRATION; INTRACAUDAL at 07:15

## 2020-10-15 RX ADMIN — CELECOXIB 400 MG: 200 CAPSULE ORAL at 06:31

## 2020-10-15 RX ADMIN — ACETAMINOPHEN 975 MG: 325 TABLET, FILM COATED ORAL at 14:05

## 2020-10-15 RX ADMIN — PHENYLEPHRINE HYDROCHLORIDE 0.2 MCG/KG/MIN: 10 INJECTION INTRAVENOUS at 08:45

## 2020-10-15 RX ADMIN — BUPIVACAINE HYDROCHLORIDE IN DEXTROSE 1.8 ML: 7.5 INJECTION, SOLUTION SUBARACHNOID at 07:48

## 2020-10-15 RX ADMIN — CEFAZOLIN SODIUM 2 G: 2 INJECTION, SOLUTION INTRAVENOUS at 17:22

## 2020-10-15 RX ADMIN — ACETAMINOPHEN 975 MG: 325 TABLET, FILM COATED ORAL at 23:10

## 2020-10-15 RX ADMIN — TRANEXAMIC ACID 1950 MG: 650 TABLET ORAL at 06:31

## 2020-10-15 RX ADMIN — OXYCODONE HYDROCHLORIDE 10 MG: 10 TABLET ORAL at 22:46

## 2020-10-15 RX ADMIN — PROPOFOL 100 MCG/KG/MIN: 10 INJECTION, EMULSION INTRAVENOUS at 07:53

## 2020-10-15 RX ADMIN — SODIUM CHLORIDE, POTASSIUM CHLORIDE, SODIUM LACTATE AND CALCIUM CHLORIDE: 600; 310; 30; 20 INJECTION, SOLUTION INTRAVENOUS at 07:53

## 2020-10-15 RX ADMIN — OXYCODONE HYDROCHLORIDE 5 MG: 5 TABLET ORAL at 17:22

## 2020-10-15 RX ADMIN — DEXAMETHASONE SODIUM PHOSPHATE 2 MG: 4 INJECTION, SOLUTION INTRAMUSCULAR; INTRAVENOUS at 08:23

## 2020-10-15 RX ADMIN — MIDAZOLAM 2 MG: 1 INJECTION INTRAMUSCULAR; INTRAVENOUS at 07:39

## 2020-10-15 RX ADMIN — PROPOFOL 30 MG: 10 INJECTION, EMULSION INTRAVENOUS at 08:08

## 2020-10-15 RX ADMIN — DOCUSATE SODIUM 100 MG: 100 CAPSULE, LIQUID FILLED ORAL at 20:19

## 2020-10-15 RX ADMIN — SODIUM CHLORIDE, POTASSIUM CHLORIDE, SODIUM LACTATE AND CALCIUM CHLORIDE: 600; 310; 30; 20 INJECTION, SOLUTION INTRAVENOUS at 22:45

## 2020-10-15 RX ADMIN — ACETAMINOPHEN 975 MG: 325 TABLET, FILM COATED ORAL at 06:30

## 2020-10-15 RX ADMIN — OXYCODONE HYDROCHLORIDE 5 MG: 5 SOLUTION ORAL at 13:19

## 2020-10-15 RX ADMIN — DEXMEDETOMIDINE 20 MCG: 100 INJECTION, SOLUTION, CONCENTRATE INTRAVENOUS at 07:15

## 2020-10-15 RX ADMIN — CEFAZOLIN 1 G: 1 INJECTION, POWDER, FOR SOLUTION INTRAMUSCULAR; INTRAVENOUS at 09:40

## 2020-10-15 RX ADMIN — KETOROLAC TROMETHAMINE 15 MG: 30 INJECTION, SOLUTION INTRAMUSCULAR; INTRAVENOUS at 18:21

## 2020-10-15 ASSESSMENT — MIFFLIN-ST. JEOR: SCORE: 2206.5

## 2020-10-15 NOTE — PROGRESS NOTES
[facility]     Hospitalist Consultation    Date of Admission:  10/15/2020  Date of Consult (When I saw the patient): 10/15/20    Assessment & Plan     # S/P RTKA  -per primary team  -hydrate well  -good IS  -ankle pumps  -pain and DVT prophylaxis management per orthopedics  # Essential hypertension: PTA on triamteren-hydrochlorothiazide 75-50 mg qd and losartan 50 mg every day. Resume if BP consistently high. Restart losartan. Hold diuretics.   # GERD: Ct Prilosec 40 mg daily.     DVT Prophylaxis: Per primary team  Code Status: Full Code    Disposition: Per primary team.    Paul Parnell MD     Reason for Consult   Reason for consult:  Right total knee arthroplasty    Primary Care Physician   Arun Whitley    Chief Complaint      SP RTKA    History of Present Illness   Aydin Aldridge is a 50 year old male with Hx of GERD and HT underwent R TKA. Doing well. No chest pain or sob. Pain controlled. No vomiting.     Past Medical History      I have reviewed this patient's medical history and updated it with pertinent information if needed.   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  Past Surgical History:   Procedure Laterality Date     C OPEN RX ANKLE DISLOCATN+FIXATN       Prior to Admission Medications      Prior to Admission Medications   Prescriptions Last Dose Informant Patient Reported? Taking?   LOSARTAN POTASSIUM PO   Yes No   Multiple Vitamins-Minerals (MULTIVITAMIN ADULT PO) 10/12/2020  Yes No   hylan (SYNVISC) 16 MG/2ML injection   No No   Si mLs (16 mg) by INTRA-ARTICULAR route once a week   omeprazole (PRILOSEC) 40 MG DR capsule 10/15/2020 at 0500  Yes Yes   Sig: Take 40 mg by mouth daily   triamterene-hydrochlorothiazide (MAXZIDE) 75-50 MG per tablet  10/14/2020 at 0900  Yes Yes   Sig: Take 1 tablet by mouth daily      Facility-Administered Medications Last Administration Doses Remaining   hylan (SYNVISC ONE) injection 48 mg 12/12/2019  8:37 AM    hylan (SYNVISC ONE) injection 48 mg 12/12/2019  8:37 AM    hylan (SYNVISC ONE) injection 48 mg 12/5/2019 10:01 AM    hylan (SYNVISC ONE) injection 48 mg 12/5/2019 10:01 AM    hylan (SYNVISC ONE) injection 48 mg 12/19/2019  9:16 AM    hylan (SYNVISC ONE) injection 48 mg 12/19/2019  9:16 AM    hylan (SYNVISC) injection 16 mg None recorded 1   hylan (SYNVISC) injection 16 mg 5/16/2019  8:06 AM    lidocaine (PF) (XYLOCAINE) 1 % injection 2 mL 3/7/2019 10:33 AM    lidocaine (PF) (XYLOCAINE) 1 % injection 2 mL 3/7/2019 10:33 AM    lidocaine (PF) (XYLOCAINE) 1 % injection 2 mL 11/21/2019  8:34 AM    lidocaine (PF) (XYLOCAINE) 1 % injection 2 mL 11/21/2019  8:34 AM    lidocaine (PF) (XYLOCAINE) 1 % injection 2 mL 7/27/2020 12:02 PM    triamcinolone (KENALOG-40) injection 40 mg 3/7/2019 10:33 AM    triamcinolone (KENALOG-40) injection 40 mg 3/7/2019 10:33 AM    triamcinolone (KENALOG-40) injection 40 mg 11/21/2019  8:34 AM    triamcinolone (KENALOG-40) injection 40 mg 11/21/2019  8:34 AM    triamcinolone (KENALOG-40) injection 40 mg 7/27/2020 12:02 PM         Allergies   Allergies   Allergen Reactions     Nickel Rash     Social History      I have reviewed this patient's social history and updated it with pertinent information if needed. Aydin Aldridge  reports that he has never smoked. He has never used smokeless tobacco. He reports that he does not drink alcohol or use drugs.    Family History      I have reviewed this patient's family history and updated it with pertinent information if needed.   Family History   Problem Relation Age of Onset     Hypertension Father      Cancer - colorectal Maternal Grandmother      Cancer Paternal Grandmother         skin     Review of Systems      The 10 point Review of Systems is negative  other than noted in the HPI or here.     Physical Exam      Temp: 98.2  F (36.8  C) Temp src: Oral BP: (!) 158/87 Pulse: 106   Resp: 14 SpO2: 98 % O2 Device: None (Room air) Oxygen Delivery: 3 LPM  Vital Signs with Ranges  Temp:  [97.5  F (36.4  C)-98.7  F (37.1  C)] 98.2  F (36.8  C)  Pulse:  [] 106  Resp:  [12-35] 14  BP: ()/(47-99) 158/87  SpO2:  [91 %-100 %] 98 %  277 lbs 8.95 oz    Constitutional: Awake, alert, cooperative, no apparent distress.  Eyes: Conjunctiva and pupils examined and normal.  HEENT: Moist mucous membranes, normal dentition.  Respiratory: Clear to auscultation bilaterally, no crackles or wheezing.  Cardiovascular: Regular rate and rhythm, normal S1 and S2, and no murmur noted.  GI: Soft, non-distended, non-tender, normal bowel sounds.  Lymph/Hematologic: No anterior cervical or supraclavicular adenopathy.  Skin: No rashes, no cyanosis, no edema.  Musculoskeletal: SP RTKA arthroplasty  Neurologic: Cranial nerves 2-12 intact, normal strength and sensation.  Psychiatric: Alert, oriented to person, place and time, no obvious anxiety or depression.    Data      -Data reviewed today: All pertinent laboratory and imaging results from this encounter were reviewed.    Recent Labs   Lab 10/15/20  0614   HGB 14.5   POTASSIUM 3.4   *       Recent Results (from the past 24 hour(s))   XR Knee Port Right 1/2 Views    Narrative    EXAM: XR KNEE PORT RT 1/2 VW  10/15/2020 11:15 AM      HISTORY: PACU POST OP TOTAL KNEE    COMPARISON: 9/20/2020    FINDINGS: Portable postoperative AP and crosstable lateral views of  the right knee.    New total right knee arthroplasty. Components appear well seated.  Postsurgical subcutaneous gas. Chronic deformity of the distal femur.  Surgical drain noted. Foci of soft tissue mineralization lateral to  the distal femur and proximal to the fibular styloid, likely  postsurgical.       Impression    IMPRESSION: New total right knee arthroplasty.         YEVGENIY  (Clint) CRISTHIAN SHELTON MD

## 2020-10-15 NOTE — PROGRESS NOTES
"   10/15/20 1501   Quick Adds   Type of Visit Initial PT Evaluation       Present no   Living Environment   People in home spouse;child(dennys), dependent;other relative(s)   Current Living Arrangements house   Home Accessibility stairs within home;stairs to enter home   Number of Stairs, Main Entrance 2   Stair Railings, Main Entrance railing on left side (ascending)   Number of Stairs, Within Home, Primary 6   Stair Railings, Within Home, Primary railing on right side (ascending)   Transportation Anticipated family or friend will provide   Self-Care   Usual Activity Tolerance moderate   Regular Exercise Other (see comments)   Equipment Currently Used at Home none   Disability/Function   Hearing Difficulty or Deaf yes   Patient's preferred means of communication verbal   Describe hearing loss hearing loss on right side   Use of hearing assistive devices none   Were auxiliary aids offered? no   Wear Glasses or Blind yes   Vision Management   (glasses)   Concentrating, Remembering or Making Decisions Difficulty no   Difficulty Communicating no   Difficulty Eating/Swallowing no   Walking or Climbing Stairs Difficulty no   Dressing/Bathing Difficulty no   Toileting no   Doing Errands Independently Difficulty (such as shopping) no   Fall history within last six months no   General Information   Onset of Illness/Injury or Date of Surgery 10/15/20   Referring Physician Tyrese Chang MD   Patient/Family Therapy Goals Statement (PT) \"walk out with a cane\"   Pertinent History of Current Problem (include personal factors and/or comorbidities that impact the POC) Aydin Aldridge is a 50 year old male s/p R TKA   Existing Precautions/Restrictions fall   Weight-Bearing Status - LUE full weight-bearing   Weight-Bearing Status - RUE full weight-bearing   Weight-Bearing Status - LLE full weight-bearing   Weight-Bearing Status - RLE weight-bearing as tolerated   Heart Disease Risk Factors Overweight;Lack of " physical activity;High blood pressure   General Observations PCD, CPM, BP cuff, fluid IVs   Cognition   Orientation Status (Cognition) oriented to;person;place;time;situation   Affect/Mental Status (Cognition) WNL   Follows Commands (Cognition) WNL   Pain Assessment   Patient Currently in Pain Yes, see Vital Sign flowsheet   Integumentary/Edema   Integumentary/Edema Comments Normal post-op swelling on R LE   Posture    Posture Protracted shoulders;Forward head position;Kyphosis   Range of Motion (ROM)   ROM Comment R LE AAROM 0-11-73   Strength   Strength Comments Not formally assessed, WNL for functional mobility   Bed Mobility   Comment (Bed Mobility) Supine <> sit via sit pivot, HOB elevated, SBA provided   Transfers   Transfer Safety Comments Sit <> stand from elevated bed height to FWW, CGA   Gait/Stairs (Locomotion)   Comment (Gait/Stairs) Pt able to take 2 small steps forward using FWW, CGA. Pt anxious about WB on R LE declined to go further   Balance   Balance Comments SBA seated and standing balance   Sensory Examination   Sensory Perception Comments Pt reports numbness and tingling in R LE   Clinical Impression   Criteria for Skilled Therapeutic Intervention yes, treatment indicated   PT Diagnosis (PT) impaired functional mobility   Influenced by the following impairments s/p R TKA   Functional limitations due to impairments bed mobility, transfers, gait, and stairs   Clinical Presentation Stable/Uncomplicated   Clinical Presentation Rationale No significant comorbidites affecting POC   Clinical Decision Making (Complexity) low complexity   Therapy Frequency (PT) 2x/day   Predicted Duration of Therapy Intervention (days/wks) 2   Planned Therapy Interventions (PT) bed mobility training;gait training;home exercise program;ROM (range of motion);stair training;strengthening;transfer training   Anticipated Equipment Needs at Discharge (PT) other (see comments)  (has walker, crutches, shower chair, and cane at  "home)   Risk & Benefits of therapy have been explained evaluation/treatment results reviewed;care plan/treatment goals reviewed;patient;spouse/significant other   PT Discharge Planning    PT Discharge Recommendation (DC Rec) home with assist;home with outpatient physical therapy   PT Rationale for DC Rec Pt requesting OP PT at [facility] near [place]. mobilizing appropriately post-op, anxious about movement but demonstrates ability to mobilize well for stage in recovery. Has sufficient support at home from family   PT Brief overview of current status  SBA bed mobility, CGA transfers and gait   Therapy Certification   Start of care date 10/15/20   Certification date from 10/15/20   Certification date to 10/17/20   Medical Diagnosis s/p right TKA   [facility] AM-PAC  \"6 Clicks\" V.2 Basic Mobility Inpatient Short Form   1. Turning from your back to your side while in a flat bed without using bedrails? 4 - None   2. Moving from lying on your back to sitting on the side of a flat bed without using bedrails? 4 - None   3. Moving to and from a bed to a chair (including a wheelchair)? 3 - A Little   4. Standing up from a chair using your arms (e.g., wheelchair, or bedside chair)? 4 - None   5. To walk in hospital room? 3 - A Little   6. Climbing 3-5 steps with a railing? 3 - A Little   Basic Mobility Raw Score (Score out of 24.Lower scores equate to lower levels of function) 21   Total Evaluation Time   Total Evaluation Time (Minutes) 11     "

## 2020-10-15 NOTE — OR NURSING
PACU to Inpatient Nursing Handoff    Patient Aydin Aldridge is a 50 year old male who speaks English.   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    Anesthesia Spinal   Dermatome Level Dermatomes Left: S1  Dermatomes Right: S1   Preop Meds acetaminophen (Tylenol) - time given: 975mg @ 0630  celecoxib (Celebrex) - time given: 400mg @ 0631  versed 3mg & fentanyl 50mcg @ 0739. Also rec'd kenalog 40mg @ 0755. - time given: 0755   Nerve block Adductor canal.  Location:right. Med:bupivacaine and epinephrine. Time given: 0742   Intraop Meds dexamethasone (Decadron)  dexmedetomidine (Precedex): 20 mcg total  transexemic acid.   Local Meds Yes - Local Cocktail (morphine, ropivacaine, epinephrine, Toradol)   Antibiotics cefazolin (Ancef) - last given at 0940     Pain Patient Currently in Pain: yes('a little', continues to be very drowsy/defferred analgesic)  Comfort: negligible pain   PACU meds  ondansetron (Zofran): 4 mg (total dose) last given at 1118    PCA / epidural No   Capnography Respiratory Monitoring (EtCO2): 37 mmHg   Telemetry ECG Rhythm: Normal sinus rhythm   Inpatient Telemetry Monitor Ordered? No        Labs Glucose Lab Results   Component Value Date     10/15/2020       Hgb Lab Results   Component Value Date    HGB 14.5 10/15/2020       INR No results found for: INR   PACU Imaging Completed     Wound/Incision Incision/Surgical Site 10/15/20 Right;Anterior;Midline Knee (Active)   Incision Assessment UTV 10/15/20 1100   Closure Adhesive strip(s) 10/15/20 1100   Incision Drainage Amount None 10/15/20 1100   Dressing Intervention Clean, dry, intact 10/15/20 1100   Number of days: 0      CMS        Equipment continuous passive  "motion machine (CPM)   Other LDA       IV Access Peripheral IV 10/15/20 Left Hand (Active)   Site Assessment WDL 10/15/20 1100   Line Status Infusing 10/15/20 1100   Phlebitis Scale 0-->no symptoms 10/15/20 1100   Infiltration Scale 0 10/15/20 1100   Number of days: 0      Blood Products Not applicable EBL 50 mL   Intake/Output Date 10/15/20 0700 - 10/16/20 0659   Shift 1708-1451 0927-1931 7813-6476 24 Hour Total   INTAKE   I.V. 1000   1000   Shift Total(mL/kg) 1000(7.94)   1000(7.94)   OUTPUT   Shift Total(mL/kg)       Weight (kg) 125.9 125.9 125.9 125.9      Drains / Vieira Closed/Suction Drain Right Knee Accordion 10 Cymraes (Active)   Site Description UTV 10/15/20 1100   Dressing Status Normal: Clean, Dry & Intact 10/15/20 1100   Drainage Appearance Bloody/Bright Red 10/15/20 1100   Status To bulb suction 10/15/20 1100   Number of days: 0      Time of void PreOp Void Prior to Procedure: 0622 (10/15/20 0621)    PostOp      Diapered? No   Bladder Scan     PO    not yet.     Vitals    B/P: 136/84  T: 97.7  F (36.5  C)    Temp src: Axillary  P:  Pulse: 85 (10/15/20 1115)          R: 20  O2:  SpO2: 97 %    O2 Device: Nasal cannula (10/15/20 1115)    Oxygen Delivery: 3 LPM (10/15/20 1115)         Family/support present significant other   Patient belongings     Patient transported on bed   DC meds/scripts (obs/outpt) Not applicable   Inpatient Pain Meds Released? Yes       Special needs/considerations Not diagnoses/probably sleep apnea present.    Tasks needing completion Monitor bladder. Bladder scanned 300-320ml at noon, continue to monitor patient. Also, may benefit from a bed extension due to height 6'3\" and CPM.          Leonie Dodd RN  [facility] [phone]  "

## 2020-10-15 NOTE — ANESTHESIA POSTPROCEDURE EVALUATION
Anesthesia POST Procedure Evaluation    Patient: Aydin Aldridge   MRN:     [id number] Gender:   male   Age:    50 year old :      1970        Preoperative Diagnosis: Post-traumatic osteoarthritis of right knee [M17.31]   Procedure(s):  Right total knee arthroplasty  INJECTION, STEROID LEFT KNEE   Postop Comments: No value filed.     Anesthesia Type: Spinal, MAC, Peripheral Nerve Block          Postop Pain Control: Uneventful            Sign Out: Well controlled pain   PONV: No   Neuro/Psych: Uneventful            Sign Out: Acceptable/Baseline neuro status   Airway/Respiratory: Uneventful            Sign Out: Acceptable/Baseline resp. status   CV/Hemodynamics: Uneventful            Sign Out: Acceptable CV status   Other NRE: NONE   DID A NON-ROUTINE EVENT OCCUR? No    Event details/Postop Comments:  Patient tolerated procedure well. Ready for discharge to floor.          Last Anesthesia Record Vitals:  CRNA VITALS  10/15/2020 0945 - 10/15/2020 1045      10/15/2020             NIBP:  90/47    Pulse:  84    Temp:  36.5  C (97.7  F)    SpO2:  95 %    EKG:  Sinus rhythm          Last PACU Vitals:  Vitals Value Taken Time   /95 10/15/20 1200   Temp 36.4  C (97.5  F) 10/15/20 1130   Pulse 98 10/15/20 1212   Resp 25 10/15/20 1212   SpO2 98 % 10/15/20 1212   Temp src     NIBP 90/47 10/15/20 1025   Pulse 84 10/15/20 1025   SpO2 95 % 10/15/20 1025   Resp     Temp 36.5  C (97.7  F) 10/15/20 1025   Ht Rate     Temp 2     Vitals shown include unvalidated device data.      Electronically Signed By: Ruddy Monzon MD, October 15, 2020, 12:13 PM

## 2020-10-15 NOTE — PLAN OF CARE
Patient vital signs are at baseline: Yes  Patient able to ambulate as they were prior to admission or with assist devices provided by therapies during their stay:  Yes  Patient MUST void prior to discharge:  Yes  Patient able to tolerate oral intake:  Yes  Pain has adequate pain control using Oral analgesics:  Yes    Pt arrived to unit at 1245 and was oriented to room and call light  VS: VSS   O2: >90% on RA; refused capnography   Output: Voided 500; first PVR 21   Last BM: 10/14   Activity: WBAT; 1A with FWW and gait belt; worked with PT this afternoon   Up for meals? Declined   Skin: Incision/drain R knee   Pain: 5mg oxycodone q4h   CMS: Intact after spinal wore off   Dressing: ACE with small amount of drainage around hemovac   Diet: Regular   LDA: L hand PIV infusing   Equipment: PCDs, IV pole, gait belt, FWW, CPM   Plan: TBD   Additional Info: Pt received spinal anesthesia + bupivicaine block + cocktail. EBL 50

## 2020-10-15 NOTE — ANESTHESIA PROCEDURE NOTES
Peripheral Nerve Block Procedure Note      Staff -   Anesthesiologist:  Ruddy Monzon MD  Resident/Fellow: Bry Woo DO  Performed By: resident  Procedure performed by resident/CRNA in presence of a teaching physician.    Location: Pre-op  Procedure Start/Stop TImes:      10/15/2020 7:00 AM     10/15/2020 7:15 AM    patient identified, IV checked, site marked, risks and benefits discussed, informed consent, monitors and equipment checked, pre-op evaluation, at physician/surgeon's request and post-op pain management      Correct Patient: Yes      Correct Position: Yes      Correct Site: Yes      Correct Procedure: Yes      Correct Laterality:  Yes    Site Marked:  Yes  Procedure details:     Procedure:  Adductor canal    ASA:  2    Diagnosis:  Postoperative pain relief    Laterality:  Right    Position:  Supine    Sterile Prep: chloraprep, mask and sterile gloves      Local skin infiltration:  None    Needle:  Insulated    Needle gauge:  21    Needle length (mm):  110    Ultrasound: Yes      Ultrasound used to identify targeted nerve, plexus, or vascular structure and placed a needle adjacent to it      Permanent Image entered into patiient's record      Abnormal pain on injection: No      Blood Aspirated: No      Paresthesias:  No    Bleeding at site: No      Bolus via:  Needle    Infusion Method:  Single Shot    Complications:  None  Assessment/Narrative:     Injection made incrementally with aspirations every (mL):  5

## 2020-10-15 NOTE — PLAN OF CARE
[facility]      OUTPATIENT PHYSICAL THERAPY EVALUATION  PLAN OF TREATMENT FOR OUTPATIENT REHABILITATION  (COMPLETE FOR INITIAL CLAIMS ONLY)  Patient's Last Name, First Name, M.I.  YOB: 1970  Aydin Aldridge                           Provider's Name  [facility] Medical Record No.  [id number]                               Onset Date:  10/15/20   Start of Care Date:  10/15/20      Type:     _X_PT   ___OT   ___SLP Medical Diagnosis:  s/p right TKA                        PT Diagnosis:  impaired functional mobility   Visits from SOC:  1   _________________________________________________________________________________  Plan of Treatment/Functional Goals    Planned Interventions: bed mobility training, gait training, home exercise program, ROM (range of motion), stair training, strengthening, transfer training     Goals: See Physical Therapy Goals on Care Plan in [employer] electronic health record.    Therapy Frequency: 2x/day  Predicted Duration of Therapy Intervention: 2  _________________________________________________________________________________    I CERTIFY THE NEED FOR THESE SERVICES FURNISHED UNDER        THIS PLAN OF TREATMENT AND WHILE UNDER MY CARE     (Physician co-signature of this document indicates review and certification of the therapy plan).              Certification date from: 10/15/20, Certification date to: 10/17/20    Referring Physician: Tyrese Chang MD            Initial Assessment        See Physical Therapy evaluation dated 10/15/20 in Epic electronic health record.

## 2020-10-15 NOTE — OR NURSING
Aydin is coughing frequently. He said this cough is normal for him. Dr Monzon talking with patient about this

## 2020-10-15 NOTE — OP NOTE
Procedure Date: 10/15/2020      PREOPERATIVE DIAGNOSES:   1. Advanced tricompartmental osteoarthritis, posttraumatic, right knee.   2. Early osteoarthritis, left knee.      POSTOPERATIVE DIAGNOSES:   1. Advanced tricompartmental osteoarthritis, posttraumatic, right knee.   2. Early osteoarthritis, left knee.      PROCEDURES PERFORMED:   1. Complex primary right total knee arthroplasty.   2. Left knee corticosteroid injection.      INDICATION FOR SURGERY:  Aydin has a complex history dating to a childhood open fracture of the right distal femur causing growth arrest and penetration of the quadriceps with the femoral shaft.  This resulted in a severe quadriceps contracture and a shortening of the leg.  This required a right femoral lengthening using a retrograde lengthening over nail technique performed 15 years ago by myself.  Ultimately, severe osteoarthritis has developed in the patellofemoral and the tibiofemoral joint.  There is severe intrinsic deformity of the joint and contracture.  Risks, goals and options were discussed on multiple occasions and he wished to proceed with surgery.      DESCRIPTION OF PROCEDURE:  Under subarachnoid block anesthesia, the patient was placed supine.  The right knee, thigh and leg were prepped and draped in the usual fashion and a pause for the cause occurred with all people present in agreement.  We exsanguinated the limb and inflated the tourniquet to 300 mmHg and proceeded with a straight midline incision connecting 2 prior incisions.  This was extended proximally and distally for better exposure due to the severe quadriceps contracture.  We everted the patella and found it to be unusually thick at 30 mm with distorted anatomy and severe chondral wear.  We resected half the infrapatellar fat pad and resected the patella down to a 14 mm thickness for a 38 mm asymmetric patellar button.  We established computer navigation with the [employer] system that showed an 8.5 degree flexion  contracture with further flexion achieved to 101 degrees.  Kinematic curve was abnormal.  We navigated the distal femoral cut and the proximal tibial cut, and then we did a 4-in-1 cut block on the femur and because of severe abnormalities of the cruciate ligaments and severe scarring and synovial pannus, we did a posterior cruciate sacrificing femoral component of size 6 with a size 7 universal tibial component.  We prepared the surfaces, removed the menisci and injected the posterior capsule in the usual fashion and we prepared the tibial tray with appropriate rotation.  We trialed a 6 femur and a 7 tibia and found a 9 mm insert provided full extension and flexion to 118 degrees maximum.  Patellar tracking was ideal.  We pulse lavaged all surfaces and in 2 separate batches did the tibial and femoral cementation with antibiotic bone cement and then the patellar cementation.  We held all surfaces in compression until polymerization was complete.      The left knee was prepped with chlorhexidine laterally and 20 mg of Kenalog and 2 mL of plain lidocaine was instilled in the joint with a Band-Aid applied and no problems noted.      SURGEON:  Latricia Chang MD.      FIRST ASSISTANT: David Maher MD      A 22 modifier code is added.  The complex prior history, the contracture and the deformity required particularly significant preoperative planning, templating and computer graphics, as well as intraoperative time and expertise.  A second assistant, Kim Carlos, was required for further retraction because of the contractures.      Addendum #[id number] added lg 10/15/20                     LATRICIA CHANG MD             D: 10/15/2020   T: 10/15/2020   MT: VICTOR HUGO      Name:     QUAN GRAHAM   MRN:      [id number]        Account:        [id number]   :      1970           Procedure Date: 10/15/2020      Document: [id number]

## 2020-10-15 NOTE — ANESTHESIA CARE TRANSFER NOTE
Patient: Aydin Aldridge    Procedure(s):  Right total knee arthroplasty  INJECTION, STEROID LEFT KNEE    Diagnosis: Post-traumatic osteoarthritis of right knee [M17.31]  Diagnosis Additional Information: No value filed.    Anesthesia Type:   Spinal, MAC, Peripheral Nerve Block     Note:  Airway :Face Mask  Patient transferred to:PACU  Comments: Oxygen via facemask at 6 liters per minute to PACU. Oxygen tubing connected to wall O2 in PACU, SpO2, NiBP, and EKG monitors and alarms on and functioning, report on patient's clinical status given to PACU RN, RN questions answered.     Bry Woo DO.  Anesthesiology Resident CA-2, PGY-3  Pager [phone]  Handoff Report: Identifed the Patient, Identified the Reponsible Provider, Reviewed the pertinent medical history, Discussed the surgical course, Reviewed Intra-OP anesthesia mangement and issues during anesthesia, Set expectations for post-procedure period and Allowed opportunity for questions and acknowledgement of understanding      Vitals: (Last set prior to Anesthesia Care Transfer)    CRNA VITALS  10/15/2020 0945 - 10/15/2020 1025      10/15/2020             NIBP:  90/47    Pulse:  84    Temp:  36.5  C (97.7  F)    SpO2:  95 %    EKG:  Sinus rhythm                Electronically Signed By: Bry Woo DO  October 15, 2020  10:25 AM

## 2020-10-15 NOTE — LETTER
Health Information Management Services               Recipient:  Adwoa PerdoAmy         Sender:  Leonie Kimble RN, BSN  Care Coordinator, [facility]  Phone [phone]  Pager [phone]          Date: October 16, 2020  Patient Name:  Aydin Aldridge  Routing Message:      Outpatient PT referral.  Please contact patient for appointment.  Hospital discharge date 10/16/2020        The documents accompanying this notice contain confidential information belonging to the sender.  This information is intended only for the use of the individual or entity named above.  The authorized recipient of this information is prohibited from disclosing this information to any other party and is required to destroy the information after its stated need has been fulfilled, unless otherwise required by state law.      If you are not the intended recipient, you are hereby notified that any disclosure, copy, distribution or action taken in reliance on the contents of these documents is strictly prohibited.  If you have received this document in error, please return it by fax to [phone] with a note on the cover sheet explaining why you are returning it (e.g. not your patient, not your provider, etc.).  If you need further assistance, please call [facility] Centralized Transcription at [phone].  Documents may also be returned by mail to [employer], , [facility] [address] [place], [place] [address].

## 2020-10-15 NOTE — BRIEF OP NOTE
[facility] Brief Operative Note    Pre-operative diagnosis: Post-traumatic osteoarthritis of right knee [M17.31]   Post-operative diagnosis Same as preoperative diagnosis   Procedure: Procedure(s):  Right total knee arthroplasty  INJECTION, STEROID LEFT KNEE   Surgeon(s): Tyrese Chang MD, Max DO [Assistant - Recon Fellow]  Danni Trejo [Assistant]   Estimated blood loss: 50    Specimens: * No specimens in log *   Findings: Distorted anatomy of distal femur secondary to childhood right femur fracture and subsequent lengthening. Significant, grade 4, end stage, bone on bone, osteoarthritis of all three compartments.       Plan: Ortho Primary  Monitoring: q2h CMS checks until block wears off  Activity: Adlib  Weight bearing status: WBAT RLE  Range of motion: right knee ROM as tolerated. See CPM instructions  Antibiotics: Ancef x24 hours perioperatively.  Diet: Begin with clear fluids and progress diet as tolerated.  DVT prophylaxis: Mechanical + ASA  Bracing/Splinting: None  Elevation: Elevate RLE on pillows to keep at level of heart as much as possible.  Wound Care: Keep dressings in place until POD2  Pain management: PO pain control  X-rays: PACU  Physical Therapy: ROM, ADL's.  Labs: Trend Hgb on POD #1.  Consults: PT + Med Mng  Follow-up: Clinic with Dr. Chang in 2 weeks  Disposition: Pending progress with therapies, pain control on orals, and medical stability, anticipate discharge to home on POD #1.

## 2020-10-15 NOTE — PHARMACY-ADMISSION MEDICATION HISTORY
Admission medication history interview status for the 10/15/2020 admission is complete. See Epic admission navigator for allergy information, pharmacy, prior to admission medications and immunization status.     Medication history interview sources:  Surescripts & patient    Changes made to PTA medication list (reason)  Added: Vitamin C 500mg daily  Deleted: NA  Changed: Losartan changed to 50mg daily    Additional medication history information (including reliability of information, actions taken by pharmacist): reliable information      Prior to Admission medications    Medication Sig Last Dose Taking? Auth Provider   acetaminophen (TYLENOL) 325 MG tablet Take 2 tablets (650 mg) by mouth every 4 hours  Yes Tyrese Chang MD   Ascorbic Acid (VITAMIN C) 500 MG CAPS Take 500 mg by mouth every morning 10/14/2020 at Unknown time Yes Unknown, Entered By History   aspirin 81 MG EC tablet Take 2 tablets (162 mg) by mouth daily  Yes Tyrese Chang MD   losartan (COZAAR) 50 MG tablet Take 50 mg by mouth daily 10/14/2020 at Unknown time Yes Unknown, Entered By History   omeprazole (PRILOSEC) 40 MG DR capsule Take 40 mg by mouth daily 10/15/2020 at 0500 Yes Reported, Patient   oxyCODONE (ROXICODONE) 5 MG tablet Take 1-2 tablets (5-10 mg) by mouth every 4 hours as needed for pain (Moderate to Severe)  Yes Tyrese Chang MD   polyethylene glycol (MIRALAX) 17 g packet Take 17 g by mouth daily  Yes Tyrese Chang MD   senna-docusate (SENOKOT-S/PERICOLACE) 8.6-50 MG tablet Take 1-2 tablets by mouth 2 times daily Take while on oral narcotics to prevent or treat constipation.  Yes Tyrese Chang MD   triamterene-hydrochlorothiazide (MAXZIDE) 75-50 MG per tablet Take 1 tablet by mouth daily 10/14/2020 at 0900 Yes Reported, Patient   Multiple Vitamins-Minerals (MULTIVITAMIN ADULT PO)  10/12/2020  Reported, Patient         Medication history completed by: Kusum Olivo [facility]

## 2020-10-15 NOTE — OR NURSING
Patient has had nausea after surgery in the past. He wants water as soon as he can. He gets a very dry throat and coughs.

## 2020-10-16 ENCOUNTER — APPOINTMENT (OUTPATIENT)
Dept: PHYSICAL THERAPY | Facility: CLINIC | Age: 50
End: 2020-10-16
Attending: ORTHOPAEDIC SURGERY
Payer: COMMERCIAL

## 2020-10-16 VITALS
DIASTOLIC BLOOD PRESSURE: 94 MMHG | BODY MASS INDEX: 34.51 KG/M2 | OXYGEN SATURATION: 98 % | HEART RATE: 82 BPM | WEIGHT: 277.56 LBS | RESPIRATION RATE: 17 BRPM | TEMPERATURE: 98.3 F | HEIGHT: 75 IN | SYSTOLIC BLOOD PRESSURE: 158 MMHG

## 2020-10-16 LAB
ANION GAP SERPL CALCULATED.3IONS-SCNC: 6 MMOL/L (ref 3–14)
BUN SERPL-MCNC: 19 MG/DL (ref 7–30)
CALCIUM SERPL-MCNC: 8.4 MG/DL (ref 8.5–10.1)
CHLORIDE SERPL-SCNC: 103 MMOL/L (ref 94–109)
CO2 SERPL-SCNC: 28 MMOL/L (ref 20–32)
CREAT SERPL-MCNC: 1.07 MG/DL (ref 0.66–1.25)
GFR SERPL CREATININE-BSD FRML MDRD: 81 ML/MIN/{1.73_M2}
GLUCOSE SERPL-MCNC: 129 MG/DL (ref 70–99)
HGB BLD-MCNC: 12.4 G/DL (ref 13.3–17.7)
POTASSIUM SERPL-SCNC: 3.5 MMOL/L (ref 3.4–5.3)
SODIUM SERPL-SCNC: 137 MMOL/L (ref 133–144)

## 2020-10-16 PROCEDURE — 97110 THERAPEUTIC EXERCISES: CPT | Mod: GP | Performed by: PHYSICAL THERAPIST

## 2020-10-16 PROCEDURE — 80048 BASIC METABOLIC PNL TOTAL CA: CPT | Performed by: ORTHOPAEDIC SURGERY

## 2020-10-16 PROCEDURE — 99225 PR SUBSEQUENT OBSERVATION CARE,LEVEL II: CPT | Performed by: HOSPITALIST

## 2020-10-16 PROCEDURE — 250N000011 HC RX IP 250 OP 636: Performed by: ORTHOPAEDIC SURGERY

## 2020-10-16 PROCEDURE — 97116 GAIT TRAINING THERAPY: CPT | Mod: GP | Performed by: PHYSICAL THERAPIST

## 2020-10-16 PROCEDURE — 250N000013 HC RX MED GY IP 250 OP 250 PS 637: Performed by: HOSPITALIST

## 2020-10-16 PROCEDURE — 97530 THERAPEUTIC ACTIVITIES: CPT | Mod: GP | Performed by: PHYSICAL THERAPIST

## 2020-10-16 PROCEDURE — 99207 PR CDG-CODE CATEGORY CHANGED: CPT | Performed by: HOSPITALIST

## 2020-10-16 PROCEDURE — 250N000013 HC RX MED GY IP 250 OP 250 PS 637: Performed by: ORTHOPAEDIC SURGERY

## 2020-10-16 PROCEDURE — 85018 HEMOGLOBIN: CPT | Performed by: ORTHOPAEDIC SURGERY

## 2020-10-16 PROCEDURE — 36415 COLL VENOUS BLD VENIPUNCTURE: CPT | Performed by: ORTHOPAEDIC SURGERY

## 2020-10-16 PROCEDURE — 99214 OFFICE O/P EST MOD 30 MIN: CPT | Performed by: HOSPITALIST

## 2020-10-16 PROCEDURE — 99207 PR CDG-CHARGE REQUIRED MANUAL ENTRY: CPT | Performed by: HOSPITALIST

## 2020-10-16 RX ORDER — OXYCODONE HYDROCHLORIDE 5 MG/1
5-10 TABLET ORAL EVERY 4 HOURS PRN
Qty: 30 TABLET | Refills: 0 | Status: SHIPPED | OUTPATIENT
Start: 2020-10-16 | End: 2020-11-02

## 2020-10-16 RX ORDER — TRIAMTERENE/HYDROCHLOROTHIAZID 37.5-25 MG
1 TABLET ORAL DAILY
Status: DISCONTINUED | OUTPATIENT
Start: 2020-10-16 | End: 2020-10-16 | Stop reason: HOSPADM

## 2020-10-16 RX ORDER — METOCLOPRAMIDE 10 MG/1
10 TABLET ORAL 3 TIMES DAILY PRN
Status: DISCONTINUED | OUTPATIENT
Start: 2020-10-16 | End: 2020-10-16 | Stop reason: HOSPADM

## 2020-10-16 RX ADMIN — OMEPRAZOLE 40 MG: 20 CAPSULE, DELAYED RELEASE ORAL at 09:26

## 2020-10-16 RX ADMIN — ASPIRIN 162 MG: 81 TABLET, COATED ORAL at 09:32

## 2020-10-16 RX ADMIN — KETOROLAC TROMETHAMINE 15 MG: 30 INJECTION, SOLUTION INTRAMUSCULAR; INTRAVENOUS at 09:34

## 2020-10-16 RX ADMIN — LOSARTAN POTASSIUM 50 MG: 50 TABLET, FILM COATED ORAL at 09:24

## 2020-10-16 RX ADMIN — TRIAMTERENE AND HYDROCHLOROTHIAZIDE 1 TABLET: 37.5; 25 TABLET ORAL at 10:59

## 2020-10-16 RX ADMIN — ACETAMINOPHEN 975 MG: 325 TABLET, FILM COATED ORAL at 05:18

## 2020-10-16 RX ADMIN — OXYCODONE HYDROCHLORIDE 10 MG: 10 TABLET ORAL at 05:18

## 2020-10-16 RX ADMIN — CEFAZOLIN SODIUM 2 G: 2 INJECTION, SOLUTION INTRAVENOUS at 01:58

## 2020-10-16 RX ADMIN — METOCLOPRAMIDE 10 MG: 10 TABLET ORAL at 11:00

## 2020-10-16 RX ADMIN — OXYCODONE HYDROCHLORIDE 5 MG: 5 TABLET ORAL at 11:02

## 2020-10-16 RX ADMIN — DOCUSATE SODIUM 100 MG: 100 CAPSULE, LIQUID FILLED ORAL at 09:25

## 2020-10-16 RX ADMIN — POLYETHYLENE GLYCOL 3350 17 G: 17 POWDER, FOR SOLUTION ORAL at 09:27

## 2020-10-16 NOTE — PLAN OF CARE
Patient vital signs are at baseline: Yes  Patient able to ambulate as they were prior to admission or with assist devices provided by therapies during their stay:  Yes  Patient MUST void prior to discharge:  Yes  Patient able to tolerate oral intake:  Yes  Pain has adequate pain control using Oral analgesics:  Yes     VS: VSS   O2: >90% on RA; refused capnography   Output: Voiding without difficulty   Last BM: 10/14   Activity: WBAT. 1 assist with walker   Skin: Intact ex incision   Pain: Oxycodone per pt request   CMS: Intact    Dressing: ACE with small amount of drainage around hemovac   Diet: Regular   LDA: L hand PIV infusing   Plan: TBD. Possible discharge home 10/16    Additional Info:

## 2020-10-16 NOTE — PLAN OF CARE
Problem: Adult Inpatient Plan of Care  Goal: Absence of Hospital-Acquired Illness or Injury  Intervention: Identify and Manage Fall Risk  Recent Flowsheet Documentation  Taken 10/16/2020 0900 by Dionicio Ngo  Safety Promotion/Fall Prevention:    assistive device/personal items within reach    clutter free environment maintained    fall prevention program maintained    nonskid shoes/slippers when out of bed    safety round/check completed  Intervention: Prevent Skin Injury  Recent Flowsheet Documentation  Taken 10/16/2020 0900 by Dionicio Ngo  Body Position: position changed independently  Intervention: Prevent and Manage VTE (Venous Thromboembolism) Risk  Recent Flowsheet Documentation  Taken 10/16/2020 0900 by Dionicio Ngo  VTE Prevention/Management:    pneumatic compression device    ambulation promoted    anticoagulant therapy maintained    fluids promoted  Intervention: Prevent Infection  Recent Flowsheet Documentation  Taken 10/16/2020 0900 by Dionicio Ngo  Infection Prevention:    equipment surfaces disinfected    single patient room provided  Goal: Optimal Comfort and Wellbeing  Intervention: Provide Person-Centered Care  Recent Flowsheet Documentation  Taken 10/16/2020 0900 by Dionicio Ngo  Trust Relationship/Rapport:    care explained    choices provided    thoughts/feelings acknowledged     Problem: Bleeding (Knee Arthroplasty)  Goal: Absence of Bleeding  Intervention: Monitor and Manage Bleeding  Recent Flowsheet Documentation  Taken 10/16/2020 0900 by Dionicio Ngo  Bleeding Management: dressing monitored     Problem: Bowel Motility Impaired (Knee Arthroplasty)  Goal: Effective Bowel Elimination  Intervention: Enhance Bowel Motility and Elimination  Recent Flowsheet Documentation  Taken 10/16/2020 0900 by Dionicio Ngo  Bowel Elimination Management: toileting offered  Bowel Elimination Promotion:    ambulation promoted    adequate fluid intake promoted     Problem: Joint Function Impaired (Knee  Arthroplasty)  Goal: Optimal Functional Ability  Intervention: Protect Joint Integrity  Recent Flowsheet Documentation  Taken 10/16/2020 0900 by Dionicio Ngo  Positioning: Knee Arthroplasty: positioned in full extension  ROM: Knee Replacement:    active flexion/extension encouraged    gravity-assisted extension promoted  Intervention: Promote Optimal Functional Status  Recent Flowsheet Documentation  Taken 10/16/2020 0900 by Dionicio Ngo  Self-Care Promotion:    independence encouraged    BADL personal objects within reach    BADL personal routines maintained  Adaptive Equipment Use: used with assistance  Assistive Device Utilized:    walker    gait belt  Activity Management: activity adjusted per tolerance     Problem: Ongoing Anesthesia Effects (Knee Arthroplasty)  Goal: Anesthesia/Sedation Recovery  Intervention: Optimize Anesthesia Recovery  Recent Flowsheet Documentation  Taken 10/16/2020 0900 by Dionicio Ngo  Safety Promotion/Fall Prevention:    assistive device/personal items within reach    clutter free environment maintained    fall prevention program maintained    nonskid shoes/slippers when out of bed    safety round/check completed  Patient Tolerance (IS): delisa Ngo on 10/16/2020 at 12:38 PM

## 2020-10-16 NOTE — PROGRESS NOTES
Orthopaedic Surgery Progress Note 10/16/2020    IE: No acute events overnight. AFVSS.     S: Pain well controlled. Denies numbness or tingling. Denies chest pain, SOB, nausea/vomiting. Tolerating diet. +flatus. Voiding spontaneously.  Up with nursing. Up with PT who anticipates eventual home discharge. Care plan reviewed and questions answered.     O:  Temp: 98.3  F (36.8  C) Temp src: Oral BP: (!) 158/94 Pulse: 82   Resp: 17 SpO2: 98 % O2 Device: None (Room air) Oxygen Delivery: 3 LPM    Exam:  Gen: No acute distress, resting comfortably in bed.  Resp: Non-labored breathing  MSK:  RLE:  - Dressings c/d/i. Drain to be removed today.   - SILT femoral/tibial/sural/saphenous/DP/SP nerves  - Fires TA, EHL, FHL, GaSC  - PT/DP pulses 2+, foot wwp    Drain output:   Output by Drain (mL) 10/14/20 0700 - 10/14/20 1459 10/14/20 1500 - 10/14/20 2259 10/14/20 2300 - 10/15/20 0659 10/15/20 0700 - 10/15/20 1459 10/15/20 1500 - 10/15/20 2259 10/15/20 2300 - 10/16/20 0659 10/16/20 0700 - 10/16/20 0744   Closed/Suction Drain Right Knee [address]    150 100         Recent Labs   Lab 10/16/20  0619 10/15/20  0614   HGB 12.4* 14.5     Assessment: Aydin Aldridge is a 50 year old male s/p R TKA on 10/15 with Dr. Chang. Doing well.    Plan:  Plan: Ortho Primary  Monitoring: q2h CMS checks until block wears off  Activity: Adlib  Weight bearing status: WBAT RLE  Range of motion: right knee ROM as tolerated. See CPM instructions  Antibiotics: Ancef x24 hours perioperatively.  Diet: Begin with clear fluids and progress diet as tolerated.  DVT prophylaxis: Mechanical + ASA  Bracing/Splinting: None  Elevation: Elevate RLE on pillows to keep at level of heart as much as possible.  Wound Care: Keep dressings in place until POD2  Pain management: PO pain control  X-rays: PACU  Physical Therapy: ROM, ADL's.  Labs: Trend Hgb on POD #1.  Consults: PT + Med Mng  Follow-up: Clinic with Dr. Chang in 2 weeks  Disposition: Pending progress with  therapies, pain control on orals, and medical stability, anticipate discharge to home on POD #1.    Patient discussed with Dr Chang.    Lizette Lopez MD 10/16/2020  Orthopedic Surgery, PGY4  Pager: [phone]      Please page me directly with any questions/concerns prior to paging the orthopaedic surgery resident on call on M-F between 7AM-4PM. Thanks!

## 2020-10-16 NOTE — PROGRESS NOTES
[facility], [place], [place]  Internal Medicine Progress Note    Assessment and Plans:     Aydin Aldridge is a 50 year old male who was admitted on 10/15/2020.     # S/P RTKA  -per primary team  -hydrate well  -good IS  -ankle pumps  -pain and DVT prophylaxis management per orthopedics  # Essential hypertension: PTA on triamteren-hydrochlorothiazide 75-50 mg qd and losartan 50 mg every day.  -Ct Losartan 50 mg po daily  -resume triamterene hydrochlorothiazide 37.5-25 mg po daily. Encourage to drink though   # GERD: Ct Prilosec 40 mg daily.   # Hiccups: Start reglan.     DVT Prophylaxis: per ortho  Code Status: Full Code  Disposition: per ortho      Paul Parnell MD  Text Page  (7am - 5pm, M-F)    Subjective:      Overnight Events reviewed, Chart Reviewed.   He had some hiccups.   No nausea or vomiting.   No cough, or chest pain.   No fevers.        -Data reviewed today: I reviewed all new labs and imaging results over the last 24 hours.     Exam:       Temp: 98.3  F (36.8  C) Temp src: Oral BP: (!) 158/94 Pulse: 82   Resp: 17 SpO2: 98 % O2 Device: None (Room air) Oxygen Delivery: 3 LPM  Vitals:    10/15/20 0539   Weight: 125.9 kg (277 lb 9 oz)     Vital Signs with Ranges  Temp:  [97.5  F (36.4  C)-99.9  F (37.7  C)] 98.3  F (36.8  C)  Pulse:  [] 82  Resp:  [14-26] 17  BP: (122-162)/(68-95) 158/94  SpO2:  [94 %-99 %] 98 %  I/O last 3 completed shifts:  In: 1330 [P.O.:100; I.V.:1230]  Out: 1050 [Urine:800; Drains:250]    Constitutional: No distress noted, Alert, Answering questions appropriately  Respiratory: AE is goog on both sides, no wheezing onr crackles  Cardiovascular: S1S2 normal, no new murmur  GI: Soft, non tender  Skin/Integumen: No rash      Medications     lactated ringers 75 mL/hr at 10/15/20 2245       acetaminophen  975 mg Oral Q8H     aspirin  162 mg Oral Daily     docusate sodium  100 mg Oral BID     famotidine  20  mg Oral BID    Or     famotidine  20 mg Intravenous BID     losartan  50 mg Oral Daily     multivitamin w/minerals  1 tablet Oral Daily     omeprazole  40 mg Oral Daily     polyethylene glycol  17 g Oral Daily     senna-docusate  1 tablet Oral BID     sodium chloride (PF)  3 mL Intracatheter Q8H     triamterene-HCTZ  1 tablet Oral Daily       Data   Recent Labs   Lab 10/16/20  0619 10/15/20  0614   HGB 12.4* 14.5     --    POTASSIUM 3.5 3.4   CHLORIDE 103  --    CO2 28  --    BUN 19  --    CR 1.07  --    ANIONGAP 6  --    SONAM 8.4*  --    * 105*       Recent Results (from the past 24 hour(s))   XR Knee Port Right 1/2 Views    Narrative    EXAM: XR KNEE PORT RT 1/2 VW  10/15/2020 11:15 AM      HISTORY: PACU POST OP TOTAL KNEE    COMPARISON: 9/20/2020    FINDINGS: Portable postoperative AP and crosstable lateral views of  the right knee.    New total right knee arthroplasty. Components appear well seated.  Postsurgical subcutaneous gas. Chronic deformity of the distal femur.  Surgical drain noted. Foci of soft tissue mineralization lateral to  the distal femur and proximal to the fibular styloid, likely  postsurgical.       Impression    IMPRESSION: New total right knee arthroplasty.         YEVGENIY SHELTON MD (Joe)

## 2020-10-16 NOTE — PLAN OF CARE
OT: Orders received and acknowledged. Per PT, no skilled OT needs indicated. Will complete OT orders.

## 2020-10-16 NOTE — PLAN OF CARE
Physical Therapy Discharge Summary    Reason for therapy discharge:    Discharged to home with outpatient therapy.    Progress towards therapy goal(s). See goals on Care Plan in [facility] electronic health record for goal details.  Goals met    Therapy recommendation(s):    Continued therapy is recommended.  Rationale/Recommendations:  To improve strength, ROM, and overall functional independence.

## 2020-10-16 NOTE — PLAN OF CARE
VS: Temp: 97.7  F (36.5  C) Temp src: Oral BP: (!) 151/92 Pulse: 96   Resp: 17 SpO2: 97 % O2 Device: None (Room air)    O2: Stable on RA. Pt has TIMOTEO but refuses to use a CPAP both at home and in the hospital   Output: Voiding spontaneously and without difficulty   Last BM: 10/14/20   Activity: Up SBA with walker and gait belt   Skin: Intact ex incision   Pain: Managed with oxycodone prn and scheduled tylenol and scheduled toradol    CMS: Intact    Dressing: Long ACE wrap with smaller ACE wrap reinforcement around the knee.     ACE wrap not removed for skin check this shift due to excess drainage. Necessary to keep in place    Diet: Regular   LDA: PIV L hand infusing    Equipment: IV pump and pole, CPM, personal belongings    Plan: Continue to monitor throughout shift and intervene when necessary. Possible discharge home today (10/16) pending therapies, pain control and medical stability   Additional Info:      Patient vital signs are at baseline: Yes  Patient able to ambulate as they were prior to admission or with assist devices provided by therapies during their stay:  Yes  Patient MUST void prior to discharge:  Yes  Patient able to tolerate oral intake:  Yes  Pain has adequate pain control using Oral analgesics:  Yes

## 2020-10-16 NOTE — PLAN OF CARE
Patient A&O x4, lungs sound clear, Bowel sound active - passing gas. Last BM was 10/14/20, Denied CP, lightheadedness, dizziness, numbness, tingling and SOB, iv removed, drinking well and voiding spontaneously without difficulties, able to wiggle toes, dressing changed and drain removed, CMS intact, pain tolerable and taking oxycodone for pain, ice pack applied to knee,  incentive spirometer encouraged and done several times, CPM on and tolerating well. Passed PT. heels elevated off bed, demonstrates the ability to use call light appropriately, will continue to monitor patient and possible discharge today.

## 2020-10-16 NOTE — PLAN OF CARE
Care Management Assessment and Discharge Consult    General Information   ,                   Advance Care Planning:            Communication Assessment  Patient's communication style: spoken language (English or Bilingual)  Hearing Difficulty or Deaf: yes Wear Glasses or Blind: yes    Cognitive  Cognitive/Neuro/Behavioral: WDL  Level of Consciousness: alert  Arousal Level: arouses to voice  Orientation: oriented x 4     Best Language: 0 - No aphasia  Speech: fluent, clear    Living Environment:   People in home: spouse     Current living Arrangements: house          Family/Social Support:  Care provided by:    Provides care for:             Description of Support System:                        Current Resources:   Skilled Home Care Services:    Community Resources:    Equipment currently used at home: none  Supplies currently used at home:      Employment:  Employment Status:          Financial/Environmental Concerns:            Lifestyle & Psychosocial Needs:        Socioeconomic History    Marital status:      Spouse name: Not on file    Number of children: Not on file    Years of education: Not on file    Highest education level: Not on file   Occupational History    Occupation: Tech systems     Tobacco Use    Smoking status: Never Smoker    Smokeless tobacco: Never Used   Substance and Sexual Activity    Alcohol use: No    Drug use: No    Sexual activity: Not Currently       Functional Status:  Prior to admission patient needed assistance:          Mental Health Status:  WDL                            Values/Beliefs:  Spiritual, Cultural Beliefs, Uatsdin Practices, Values that affect care: no                 Discharge Planning:  Expected Discharge Date: 10/16/20     Concerns to be Addressed: all concerns addressed in this encounter       Anticipated Discharge Disposition: Outpatient Rehab (PT, OT, SLP, Cardiac or Pulmonary)  Anticipated Discharge Services:    Anticipated Discharge DME:       Patient/family educated on Medicare website which has current facility and service quality ratings:    Referrals Placed by CM/SW: Community Resources  Education Provided on the Discharge Plan:    Patient/Family in Agreement with the Plan: yes     Disposition Comments:      Selected Continued Care - Admitted Since 10/15/2020    No services have been selected for the patient.         Additional Information:  Per patient request referral sent to Amy Singh for outpatient physical therapy. No further discharge concerns at this time. RNCC available as needed.    [address] [place] [address]  Fax: [phone]  Phone: [phone]      Leonie Kimble RN, BSN  Care Coordinator, [facility] Orth[facility]  Phone [phone]  Pager [phone]

## 2020-10-16 NOTE — DISCHARGE SUMMARY
ORTHOPAEDIC SURGERY DISCHARGE SUMMARY     Date of Admission: 10/15/2020  Date of Discharge: 10/16/2020  3:33 PM  Disposition: Home  Staff Physician: Tyrese Chang MD  Primary Care Provider: Arun Whitley    DISCHARGE DIAGNOSIS:  Post-traumatic osteoarthritis of right knee [M17.31]    PROCEDURES: Procedure(s):  Right total knee arthroplasty  INJECTION, STEROID LEFT KNEE on 10/15/2020    BRIEF HISTORY:  Aydin has a complex history dating to a childhood open fracture of the right distal femur causing growth arrest and penetration of the quadriceps with the femoral shaft.  This resulted in a severe quadriceps contracture and a shortening of the leg.  This required a right femoral lengthening using a retrograde lengthening over nail technique performed 15 years ago by myself.  Ultimately, severe osteoarthritis has developed in the patellofemoral and the tibiofemoral joint.  There is severe intrinsic deformity of the joint and contracture.  Risks, goals and options were discussed on multiple occasions and he wished to proceed with surgery.     HOSPITAL COURSE:    The patient was admitted following the above listed procedures for pain control and rehabilitation. Aydin Aldridge did well post-operatively. Medicine was consulted post operatively to aid in management of medical co-morbidities. The patient received routine nursing cares and at the time of discharge was medically stable. Vital signs were stable throughout admission. The patient is tolerating a regular diet and is voiding spontaneously. All PT/OT goals have been met for safe mobility. Pain is now controlled on oral medications which will be available on discharge. Stool softeners have been used while taking pain medications to help prevent constipation. Aydin Aldridge is deemed medically safe to discharge.     Antibiotics:  ancef given periop and 24 hours postop.   DVT prophylaxis:  Aspirin 162 mg daily initiated after surgery and will be continued  for 4 weeks.   PT Progress:  Has met PT/OT goals for safe mobility.    Weight bearing:  WBAT   Pain Meds:  Weaned off all IV pain meds by discharge.  Inpatient Events: No significant events or complications.     PHYSICAL EXAM:    Gen: No acute distress, resting comfortably in bed.  Resp: Non-labored breathing  MSK:  RLE:  - Dressings c/d/i. Drain to be removed today.   - SILT femoral/tibial/sural/saphenous/DP/SP nerves  - Fires TA, EHL, FHL, GaSC  - PT/DP pulses 2+, foot wwp    FOLLOWUP:    Clinic with Dr. Chang in 2 weeks    Orthopaedic Surgery appointments are at the [facility] ([address] [place] [address]). Call [phone] to schedule a follow-up appointment at this location with your provider.     PLANNED DISCHARGE ORDERS:      Discharge Medication List as of 10/16/2020  2:28 PM      START taking these medications    Details   acetaminophen (TYLENOL) 325 MG tablet Take 2 tablets (650 mg) by mouth every 4 hours, Disp-100 tablet, R-0, E-Prescribe      aspirin 81 MG EC tablet Take 2 tablets (162 mg) by mouth daily, Disp-60 tablet, R-0, E-Prescribe      polyethylene glycol (MIRALAX) 17 g packet Take 17 g by mouth daily, Disp-7 packet, R-0, E-Prescribe      senna-docusate (SENOKOT-S/PERICOLACE) 8.6-50 MG tablet Take 1-2 tablets by mouth 2 times daily Take while on oral narcotics to prevent or treat constipation., Disp-30 tablet, R-0, E-PrescribeWhile taking narcotics         CONTINUE these medications which have CHANGED    Details   oxyCODONE (ROXICODONE) 5 MG tablet Take 1-2 tablets (5-10 mg) by mouth every 4 hours as needed for moderate to severe pain, Disp-30 tablet, R-0, E-PrescribeDischarge today         CONTINUE these medications which have NOT CHANGED    Details   Ascorbic Acid (VITAMIN C) 500 MG CAPS Take 500 mg by mouth every morning, Historical      losartan (COZAAR) 50 MG tablet Take 50 mg by mouth daily, Historical      Multiple Vitamins-Minerals (MULTIVITAMIN ADULT  "PO) Historical      omeprazole (PRILOSEC) 40 MG DR capsule Take 40 mg by mouth daily, Historical      triamterene-hydrochlorothiazide (MAXZIDE) 75-50 MG per tablet Take 1 tablet by mouth daily, Historical         STOP taking these medications       hylan (SYNVISC) 16 MG/2ML injection Comments:   Reason for Stopping:                 Discharge Procedure Orders   Physical Therapy Referral   Standing Status: Future   Referral Priority: Routine Referral Type: Rehab Therapy Physical Therapy   Number of Visits Requested: 1     Reason for your hospital stay   Order Comments: You stayed in the hospital overnight following your knee surgery     Contact Surgeon Team   Order Comments: You may experience symptoms that require follow-up before your scheduled appointment. Refer to the \"Stoplight Tool\" for instructions on when to contact your Surgeon Team if you are concerned about pain control, blood clots, constipation, or if you are unable to urinate.     Orthopedic Urgent Care   Order Comments: If you are not able to reach your Surgeon Team and you need immediate care, go to the Orthopedic Urgent Care at your Surgeon's office.  Do NOT go to the Emergency Room unless you have shortness of breath, chest pain, or other signs of a medical emergency.     Call 911   Order Comments: Call 911 immediately if you experience sudden-onset chest pain, arm weakness/numbness, slurred speech, or shortness of breath     Breathing exercises   Order Comments: Perform breathing exercises using your Incentive Spirometer 10 times per hour while awake for 2 weeks.     Fever Management   Order Comments: A low grade fever can be expected after surgery.  Use acetaminophen (TYLENOL) as needed for fever management.  Contact your Surgeon Team if you have a fever greater than 101.5 F, chills, and/or night sweats.     Constipation management   Order Comments: Constipation (hard, dry bowel movements) is expected after surgery due to the combination of being " less active, the anesthetic, and the opioid pain medication.  You can do the following to help reduce constipation:  ~  FLUIDS:  Drink clear liquids (water or Gatorade), or juice (apple/prune).  ~  DIET:  Eat a fiber rich diet.    ~  ACTIVITY:  Get up and move around several times a day.  Increase your activity as you are able.  MEDICATIONS:  Reduce the risk of constipation by starting medications before you are constipated.  You can take Miralax   (1 packet as directed) and/or a stool softener (Senokot 1-2 tablets 1-2 times a day).  If you already have constipation and these medications are not working, you can get magnesium citrate and use as directed.  If you continue to have constipation you can try an over the counter suppository or enema.  Call your Surgeon Team if it has been greater than 3 days since your last bowel movement.     Reduced Urine Output   Order Comments: Changes in the amount of fluids you drank before and after surgery may result in problems urinating.  It is important to stay well-hydrated after surgery and drink plenty of water. If it has been greater than 8 hours since you have urinated despite drinking plenty of water, call your Surgeon Team.     Anticoagulation - aspirin   Order Comments: Take the aspirin as prescribed for a total of four weeks after surgery.  This is given to help minimize your risk of blood clot.     Exercises to prevent blood clots   Order Comments: Unless otherwise directed by your Surgeon team, perform the following exercises at least three times per day for the first four weeks after surgery to prevent blood clots in your legs: 1) Point and flex your feet (Ankle Pumps), 2) Move your ankle around in big circles, 3) Wiggle your toes, 4) Walk, even for short distances, several times a day, will help decrease the risk of blood clots.     Pain after Surgery   Order Comments: Pain after surgery is normal and expected.  You will   have some amount of pain for several weeks  "after surgery.  Your pain will improve with time.  There are several things you can do to help reduce your pain including: rest, ice, elevation, and using pain medications as needed. Contact your Surgeon Team if you have pain that persists or worsens after surgery despite rest, ice, elevation, and taking your medication(s) as prescribed. Contact your Surgeon Team if you have new numbness, tingling, or weakness in your operative extremity.     Swelling after Surgery   Order Comments: Swelling and/or bruising of the surgical extremity is common and may persist for several months after surgery. In addition to frequent icing and elevation, gentle compressive support with an ACE wrap or tubigrip may help with swelling. Apply compression regularly, removing at least twice daily to perform skin checks. Contact your Surgeon Team if your swelling increases and is NOT associated with an increase in your activity level, or if your swelling increases and is associated with redness and pain.     LOWER Extremity Elevation   Order Comments: Swelling is expected for several months after surgery. This type of swelling is usually associated with gravity and activity, and can be improved with elevation.   The best way to do this is to get your \"toes above your nose\" by laying down and placing several pillows lengthwise under your calf and heel. When elevating your leg keep your knee completely straight. Perform this elevation as often as possible especially for the first two weeks after surgery.     Cold therapy   Order Comments: Ice can be used to control swelling and discomfort after surgery. Place a thin towel over your operative site and apply the ice pack overtop. Leave ice pack in place for 20 minutes, then remove for 20 minutes. Repeat this 20 minutes on/20 minutes off routine as often as tolerated.     acetaminophen (TYLENOL) Instructions   Order Comments: You were discharged with acetaminophen (TYLENOL) for pain management " after surgery. Acetaminophen most effectively manages pain symptoms when it is taken on a schedule without missing doses (every four, six, or eight hours). Your Provider will prescribe a safe daily dose between 3000 - 4000 mg.  Do NOT exceed this daily dose. Most patients use acetaminophen for pain control for the first four weeks after surgery.  You can wean from this medication as your pain decreases.     NSAID Instructions   Order Comments: You were discharged with an anti-inflammatory medication for pain management to use in combination with acetaminophen (TYLENOL) and the narcotic pain medication.  Take this medication exactly as directed.  You should only take one anti-inflammatory at a time.  Some common anti-inflammatories include: ibuprofen (ADVIL, MOTRIN), naproxen (ALEVE, NAPROSYN), celecoxib (CELEBREX), meloxicam (MOBIC), ketorolac (TORADOL).  Take this medication with food and water.     Opioids - Tapering Instructions   Order Comments: In the first three days following surgery, your symptoms may warrant use of the narcotic pain medication every four to six hours as prescribed. This is normal. As your pain symptoms improve, focus your efforts on decreasing (tapering) use of narcotic medications. The most successful tapering strategy is to first, decrease the number of tablets you take every 4-6 hours to the minimum prescribed. Then, increase the amount of time between doses.  For example:  First, taper to   or 1 tablet every 4-6 hours.  Then, taper to   or 1 tablet every 6-8 hours.  Then, taper to   or 1 tablet every 8-10 hours.  Then, taper to   or 1 tablet every 10-12 hours.  Then, taper to   or 1 tablet at bedtime.  The bedtime dose can help with comfort during sleep and is typically the last dose to be discontinued after surgery.     Follow Up Care   Order Comments: Follow-up with your Surgeon Team in two weeks for wound check.     Activity - Total Knee Arthroplasty     Order Specific Question  Answer Comments   Is discharge order? Yes      Return to Driving   Order Comments: Driving is NOT permitted until directed by your provider. Under no circumstance are you permitted to drive while using narcotic pain medications.     Return to athletic activities   Order Comments: Activities such as swimming, bicycling, jogging, running, and stop-and-go sports should be avoided until permitted by your Provider.     Return to school/work   Order Comments: You may return to work/school when directed by your Provider.     Continuous Passive Motion Machine   Order Comments: Settings (degrees): 0 degrees to flexion tolerance with a goal of 90 degrees  Advance CPM (degrees): increments of 5 degrees every 30 minutes.  Perform for 6-8 hours daily for four weeks     Weight bearing as tolerated   Order Comments: Weight bearing as tolerated on your operative extremity.     Wound care   Order Comments: You have a clean dressing on your surgical wound. Dressing change instructions as follows: remove your dressing in 7 days, and leave incision open to air. Contact your Surgeon Team if you have increased redness, warmth around the surgical wound, and/or drainage from the surgical wound.     Shower with wound/dressing covered   Order Comments: You must COVER your dressing or incision with saran wrap (or any other non-permeable covering) to allow the incision to remain dry while showering.  You may shower 1 day after surgery as long as the surgical wound stays dry. Continue to cover your dressing or incision for showering until your first office visit.  You are strictly prohibited from soaking   or submerging the surgical wound underwater.     NO Tub bathing   Order Comments: Tub bathing, swimming, or any other activities that will cause your incision to be submerged in water should be avoided until allowed by your Surgeon.     Discharge Instructions   Order Comments: Keep dressing clean, dry and intact.   Take pain medicine only as  prescribed.  Please call the office with any new questions or concerns.   Change dressing or reinforce as needed with gauze and ace wrap.  You may allow full weight bearing, as tolerated, on the operative extremity.     Opioid Instructions (Less than 65 years)   Order Comments: You were discharged with an opioid medication (hydromorphone, oxycodone, hydrocodone, or tramadol). This medication should only be taken for breakthrough pain that is not controlled with acetaminophen (TYLENOL). If you rate your pain less than 3 you do not need this medication.  Pain rating 0-3:  You do not need this medication.  Pain rating 4-6:  Take 1 tablet every 4-6 hours as needed  Pain rating 7-10:  Take 2 tablets every 4-6 hours as needed.  Do not exceed 6 tablets per day     Regular Diet Adult     Order Specific Question Answer Comments   Is discharge order? Yes      Assign Questionnaire Series to Patient       Lizette Lopez MD   Orthopaedic Surgery, PGY-4  Pager: [phone]

## 2020-10-16 NOTE — PROGRESS NOTES
.Pt. discharged at 2:50 to home. Pt. was accompanied by his wife, and left with personal belongings. Pt. received complete discharge paperwork andmedications as filled by discharge pharmacy. Pt. was given times of last dose for all discharge medications in writing on discharge medication sheets.  Discharge teaching included, pain management, activity restrictions, dressing changes, and signs and symptoms of infection. Pt. to follow up with ortho  in the follow up appointment. Pt. had no further questions at the time of discharge and no unmet needs were identified.

## 2020-10-17 NOTE — INTERIM SUMMARY
Service Date: 10/16/2020      INPATIENT PROGRESS NOTE      SUBJECTIVE:  Aydin reports that he is doing well following his total knee arthroplasty yesterday.        OBJECTIVE:  He is demonstrating full extension and flexion to 45 degrees.  Intact neurocirculatory status is noted.  His vital signs are stable.        PLAN:  He is instructed to work particularly on extension and likely will discharge today.         LATRICIA BARRY MD             D: 10/16/2020   T: 10/16/2020   MT: RIC      Name:     AYDIN GRAHAM   MRN:      [id number]        Account:      [id number]   :      1970           Service Date: 10/16/2020      Document: [id number]

## 2020-10-19 ENCOUNTER — TELEPHONE (OUTPATIENT)
Dept: ORTHOPEDICS | Facility: CLINIC | Age: 50
End: 2020-10-19

## 2020-10-19 NOTE — TELEPHONE ENCOUNTER
[facility] Call Center    Phone Message    May a detailed message be left on voicemail: yes     Reason for Call: Other: Please fax the PT order from Dr. Chang to [facility] at fax number [phone], please call wife Tonia when this has been done     Action Taken: Message routed to:  [facility] ([facility]): Ortho    Travel Screening: Not Applicable

## 2020-10-19 NOTE — TELEPHONE ENCOUNTER
Called Yojana back and let her know I faxed over the orders for physical therapy over to [facility] at the number provided below

## 2020-11-02 ENCOUNTER — OFFICE VISIT (OUTPATIENT)
Dept: ORTHOPEDICS | Facility: CLINIC | Age: 50
End: 2020-11-02
Payer: COMMERCIAL

## 2020-11-02 ENCOUNTER — ANCILLARY PROCEDURE (OUTPATIENT)
Dept: GENERAL RADIOLOGY | Facility: CLINIC | Age: 50
End: 2020-11-02
Attending: NURSE PRACTITIONER
Payer: COMMERCIAL

## 2020-11-02 DIAGNOSIS — Z96.651 AFTERCARE FOLLOWING RIGHT KNEE JOINT REPLACEMENT SURGERY: Primary | ICD-10-CM

## 2020-11-02 DIAGNOSIS — M17.31 POST-TRAUMATIC OSTEOARTHRITIS OF RIGHT KNEE: ICD-10-CM

## 2020-11-02 DIAGNOSIS — Z96.651 S/P TKR (TOTAL KNEE REPLACEMENT), RIGHT: ICD-10-CM

## 2020-11-02 DIAGNOSIS — Z47.1 AFTERCARE FOLLOWING RIGHT KNEE JOINT REPLACEMENT SURGERY: Primary | ICD-10-CM

## 2020-11-02 PROCEDURE — 73560 X-RAY EXAM OF KNEE 1 OR 2: CPT | Mod: RT | Performed by: RADIOLOGY

## 2020-11-02 PROCEDURE — 99024 POSTOP FOLLOW-UP VISIT: CPT | Performed by: ORTHOPAEDIC SURGERY

## 2020-11-02 RX ORDER — OXYCODONE HYDROCHLORIDE 5 MG/1
5-10 TABLET ORAL EVERY 4 HOURS PRN
Qty: 30 TABLET | Refills: 0 | Status: SHIPPED | OUTPATIENT
Start: 2020-11-02 | End: 2020-11-23

## 2020-11-02 NOTE — PROGRESS NOTES
"DOS: R TKA 10/15/2020    Aydin is seen today in follow up of his right total knee replacement. His incision is healing nicely without any drainage. Sutures were removed from the navigation site without difficulty. ROM:-. Able to SLR. Using crutches for ambulating. CPM set to \"112 degrees\".  Pain is well controlled during the day but takes oxycodone at night to \"help sleep\". Alignment is near neutral. Ecchymosis noted to posterior leg. + CMS    Xrays taken today show ideal position of the tibial and femoral components without lucency or change in alignment.    Dx:  1. R TKA    Plan:  1. Continue PT, elevation, and edema control.  2. Refill oxycodone  3. Follow up in 4-6 weeks for a full length and a right knee lateral on arrival.     I, Dr. Tyrese Chang, agree with above documentation and plan of care.  "

## 2020-11-02 NOTE — LETTER
"    11/2/2020         RE: Aydin Aldridge  [address]  [facility] [address]        Dear Colleague,    Thank you for referring your patient, Aydin Aldridge, to the [facility] [place]. Please see a copy of my visit note below.    DOS: R TKA 10/15/2020    Aydin is seen today in follow up of his right total knee replacement. His incision is healing nicely without any drainage. Sutures were removed from the navigation site without difficulty. ROM:-. Able to SLR. Using crutches for ambulating. CPM set to \"112 degrees\".  Pain is well controlled during the day but takes oxycodone at night to \"help sleep\". Alignment is near neutral. Ecchymosis noted to posterior leg. + CMS    Xrays taken today show ideal position of the tibial and femoral components without lucency or change in alignment.    Dx:  1. R TKA    Plan:  1. Continue PT, elevation, and edema control.  2. Refill oxycodone  3. Follow up in 4-6 weeks for a full length and a right knee lateral on arrival.     I, Dr. Tyrese Chang, agree with above documentation and plan of care.    "

## 2020-11-18 ENCOUNTER — OFFICE VISIT - HEALTHEAST (OUTPATIENT)
Dept: AUDIOLOGY | Facility: CLINIC | Age: 50
End: 2020-11-18

## 2020-11-18 ENCOUNTER — OFFICE VISIT - HEALTHEAST (OUTPATIENT)
Dept: OTOLARYNGOLOGY | Facility: CLINIC | Age: 50
End: 2020-11-18

## 2020-11-18 ENCOUNTER — RECORDS - HEALTHEAST (OUTPATIENT)
Dept: ADMINISTRATIVE | Facility: OTHER | Age: 50
End: 2020-11-18

## 2020-11-18 DIAGNOSIS — R04.0 EPISTAXIS: ICD-10-CM

## 2020-11-18 DIAGNOSIS — R29.898 TMJ CLICK: ICD-10-CM

## 2020-11-18 DIAGNOSIS — H90.11 CONDUCTIVE HEARING LOSS OF RIGHT EAR WITH UNRESTRICTED HEARING OF LEFT EAR: ICD-10-CM

## 2020-11-18 DIAGNOSIS — R06.83 SNORING: ICD-10-CM

## 2020-11-18 DIAGNOSIS — K21.9 LARYNGOPHARYNGEAL REFLUX (LPR): ICD-10-CM

## 2020-11-18 DIAGNOSIS — H90.2 CONDUCTIVE HEARING LOSS, UNILATERAL: ICD-10-CM

## 2020-11-18 DIAGNOSIS — H93.13 TINNITUS OF BOTH EARS: ICD-10-CM

## 2020-11-22 ENCOUNTER — HEALTH MAINTENANCE LETTER (OUTPATIENT)
Age: 50
End: 2020-11-22

## 2020-11-23 ENCOUNTER — TELEPHONE (OUTPATIENT)
Dept: ORTHOPEDICS | Facility: CLINIC | Age: 50
End: 2020-11-23

## 2020-11-23 DIAGNOSIS — M17.31 POST-TRAUMATIC OSTEOARTHRITIS OF RIGHT KNEE: ICD-10-CM

## 2020-11-23 RX ORDER — OXYCODONE HYDROCHLORIDE 5 MG/1
5-10 TABLET ORAL EVERY 4 HOURS PRN
Qty: 30 TABLET | Refills: 0 | Status: SHIPPED | OUTPATIENT
Start: 2020-11-23 | End: 2021-12-10

## 2020-11-23 NOTE — TELEPHONE ENCOUNTER
Aydin underwent Right TKA on 10/15/20, was scheduled for clinic appt next Monday 11/30/20.  Due to COVID we are limiting in clinic visits and asked Aydin to either have virtual visit or come Thursday 12/3/20.  Aydin wanted to see Giuliano in clinic and appt will be arranged.  Pt also stated he is getting low on Oxycodone, taking before P.T at sometimes at bedtime, last filled #30 on 11/2/20.  Rx will be renewed.  Tanvi Neil RN

## 2020-12-03 ENCOUNTER — ANCILLARY PROCEDURE (OUTPATIENT)
Dept: GENERAL RADIOLOGY | Facility: CLINIC | Age: 50
End: 2020-12-03
Attending: ORTHOPAEDIC SURGERY
Payer: COMMERCIAL

## 2020-12-03 ENCOUNTER — OFFICE VISIT (OUTPATIENT)
Dept: ORTHOPEDICS | Facility: CLINIC | Age: 50
End: 2020-12-03
Payer: COMMERCIAL

## 2020-12-03 DIAGNOSIS — Z96.651 HX OF TOTAL KNEE ARTHROPLASTY, RIGHT: Primary | ICD-10-CM

## 2020-12-03 DIAGNOSIS — Z96.651 S/P TKR (TOTAL KNEE REPLACEMENT), RIGHT: ICD-10-CM

## 2020-12-03 PROCEDURE — 99024 POSTOP FOLLOW-UP VISIT: CPT | Performed by: NURSE PRACTITIONER

## 2020-12-03 PROCEDURE — 73560 X-RAY EXAM OF KNEE 1 OR 2: CPT | Mod: XU | Performed by: RADIOLOGY

## 2020-12-03 PROCEDURE — 77073 BONE LENGTH STUDIES: CPT | Performed by: RADIOLOGY

## 2020-12-03 RX ORDER — GABAPENTIN 300 MG/1
CAPSULE ORAL
Qty: 30 CAPSULE | Refills: 0 | Status: SHIPPED | OUTPATIENT
Start: 2020-12-03 | End: 2021-01-12

## 2020-12-03 NOTE — NURSING NOTE
Reason For Visit:   Chief Complaint   Patient presents with     Surgical Followup     6 week POP Right TKA DOS 10/15/20        There were no vitals taken for this visit.    Pain Assessment  Patient Currently in Pain: Yes  0-10 Pain Scale: 5  Primary Pain Location: Knee(Right)  Pain Descriptors: Aching, Constant, Sore  Aggravating Factors: Movement    Marni Cook ATC

## 2020-12-03 NOTE — LETTER
"    12/3/2020         RE: Aydin Aldridge  [address]  [facility] [address]        Dear Colleague,    Thank you for referring your patient, Aydin Aldridge, to the [facility] [place]. Please see a copy of my visit note below.    DOS: R TKA 10/15/2020    Aydin is seen today nearly 6 weeks post op of his right total knee replacement. He reports \"slow progress\" and continued \"night achiness\". His incision is healed. He is seeing a physical therapist 2x weekly. He has a mild effusion without specific focal areas of tenderness. No calf pain. + CMS. ROM:-6-110 today. He continues to use 2 crutches when \"walking around outside of his house\". He reports that his knee \" clunks\" at times and feels unstable at times.  He is able to SLR independently. VMO weakness noted. Alignment is neutral.     Xrays taken today show ideal position of the total knee replacement with neutral alignment and minimal unchanged leg length difference.    Dx:  1. R TKA    Plan:  1. I have encouraged Aydin to wean away from his crutches and continued to advance strengthening exercises with his therapist. I have explained that the knee clunk or click is normal and should resolve with time but it is not harmful.   2. Follow up in 4-6 weeks for a 2v right knee xray on arrival.    Romina Hartman, APRN CNP    "

## 2020-12-03 NOTE — PROGRESS NOTES
"DOS: R TKA 10/15/2020    Aydin is seen today nearly 6 weeks post op of his right total knee replacement. He reports \"slow progress\" and continued \"night achiness\". His incision is healed. He is seeing a physical therapist 2x weekly. He has a mild effusion without specific focal areas of tenderness. No calf pain. + CMS. ROM:-6-110 today. He continues to use 2 crutches when \"walking around outside of his house\". He reports that his knee \" clunks\" at times and feels unstable at times.  He is able to SLR independently. VMO weakness noted. Alignment is neutral.     Xrays taken today show ideal position of the total knee replacement with neutral alignment and minimal unchanged leg length difference.    Dx:  1. R TKA    Plan:  1. I have encouraged Aydin to wean away from his crutches and continued to advance strengthening exercises with his therapist. I have explained that the knee clunk or click is normal and should resolve with time but it is not harmful.   2. Follow up in 4-6 weeks for a 2v right knee xray on arrival.  "

## 2020-12-07 DIAGNOSIS — Z96.651 HX OF TOTAL KNEE ARTHROPLASTY, RIGHT: Primary | ICD-10-CM

## 2020-12-08 ENCOUNTER — HOSPITAL ENCOUNTER (OUTPATIENT)
Dept: CT IMAGING | Facility: HOSPITAL | Age: 50
Discharge: HOME | End: 2020-12-08
Attending: OTOLARYNGOLOGY

## 2020-12-08 DIAGNOSIS — H90.2 CONDUCTIVE HEARING LOSS, UNILATERAL: ICD-10-CM

## 2020-12-09 ENCOUNTER — COMMUNICATION - HEALTHEAST (OUTPATIENT)
Dept: OTOLARYNGOLOGY | Facility: CLINIC | Age: 50
End: 2020-12-09

## 2020-12-22 ENCOUNTER — RECORDS - HEALTHEAST (OUTPATIENT)
Dept: ADMINISTRATIVE | Facility: OTHER | Age: 50
End: 2020-12-22

## 2021-01-04 ENCOUNTER — ANCILLARY PROCEDURE (OUTPATIENT)
Dept: GENERAL RADIOLOGY | Facility: CLINIC | Age: 51
End: 2021-01-04
Attending: NURSE PRACTITIONER
Payer: COMMERCIAL

## 2021-01-04 ENCOUNTER — ANCILLARY PROCEDURE (OUTPATIENT)
Dept: GENERAL RADIOLOGY | Facility: CLINIC | Age: 51
End: 2021-01-04
Attending: ORTHOPAEDIC SURGERY
Payer: COMMERCIAL

## 2021-01-04 ENCOUNTER — OFFICE VISIT (OUTPATIENT)
Dept: ORTHOPEDICS | Facility: CLINIC | Age: 51
End: 2021-01-04
Payer: COMMERCIAL

## 2021-01-04 VITALS — WEIGHT: 277 LBS | HEIGHT: 75 IN | BODY MASS INDEX: 34.44 KG/M2

## 2021-01-04 DIAGNOSIS — Z96.651 STATUS POST TOTAL RIGHT KNEE REPLACEMENT: Primary | ICD-10-CM

## 2021-01-04 DIAGNOSIS — M25.512 CHRONIC PAIN IN LEFT SHOULDER: ICD-10-CM

## 2021-01-04 DIAGNOSIS — Z96.651 HX OF TOTAL KNEE ARTHROPLASTY, RIGHT: ICD-10-CM

## 2021-01-04 DIAGNOSIS — Z96.651 STATUS POST TOTAL RIGHT KNEE REPLACEMENT: ICD-10-CM

## 2021-01-04 DIAGNOSIS — G89.29 CHRONIC PAIN IN LEFT SHOULDER: ICD-10-CM

## 2021-01-04 PROCEDURE — 99024 POSTOP FOLLOW-UP VISIT: CPT | Performed by: ORTHOPAEDIC SURGERY

## 2021-01-04 PROCEDURE — 73560 X-RAY EXAM OF KNEE 1 OR 2: CPT | Mod: RT | Performed by: RADIOLOGY

## 2021-01-04 PROCEDURE — 73030 X-RAY EXAM OF SHOULDER: CPT | Mod: LT | Performed by: RADIOLOGY

## 2021-01-04 ASSESSMENT — MIFFLIN-ST. JEOR: SCORE: 2203.91

## 2021-01-04 NOTE — PROGRESS NOTES
"DOS: 10/15/2020 R TKA    Aydin is seen today in follow up of his total knee replacement. He reports progress in his therapy and feels he is \"turning the corner\". Incision is well healed. ROM:0-124. Quad strength is 5/5. No calf pain. Taking neurontin at night for sleep which \"has really helped\". He has a mild effusion without any focal areas of tenderness.    Xrays taken today show ideal position of the total knee replacement with idea alignment.    Dx:  1. R TKA    Plan:  1. Aydin can continue to advance his activities as tolerated.   2. Follow up in 3-6 months for a 2v xray of his right knee on arrival.     I, Dr. Tyrese Chang, agree with above documentation and plan of care.  "

## 2021-01-04 NOTE — LETTER
"    1/4/2021         RE: Aydin Aldridge  [address]  [place] [address]        Dear Colleague,    Thank you for referring your patient, Aydin Aldridge, to the [facility] [place]. Please see a copy of my visit note below.    DOS: 10/15/2020 R TKA    Aydin is seen today in follow up of his total knee replacement. He reports progress in his therapy and feels he is \"turning the corner\". Incision is well healed. ROM:0-124. Quad strength is 5/5. No calf pain. Taking neurontin at night for sleep which \"has really helped\". He has a mild effusion without any focal areas of tenderness.    Xrays taken today show ideal position of the total knee replacement with idea alignment.    Dx:  1. R TKA    Plan:  1. Aydin can continue to advance his activities as tolerated.   2. Follow up in 3-6 months for a 2v xray of his right knee on arrival.     I, Dr. Tyrese Chang, agree with above documentation and plan of care.    "

## 2021-01-06 NOTE — TELEPHONE ENCOUNTER
RECORDS RECEIVED FROM: left shoulder. Imaging in [facility]   DATE RECEIVED: Jan 20, 2021     NOTES STATUS DETAILS   OFFICE NOTE from referring provider Internal    OFFICE NOTE from other specialist N/A    DISCHARGE SUMMARY from hospital N/A    DISCHARGE REPORT from the ER N/A    OPERATIVE REPORT N/A    MEDICATION LIST Internal    IMPLANT RECORD/STICKER N/A    LABS     CBC/DIFF N/A    CULTURES N/A    INJECTIONS DONE IN RADIOLOGY N/A    MRI Internal    CT SCAN N/A    XRAYS (IMAGES & REPORTS) Internal    TUMOR     PATHOLOGY  Slides & report N/A    01/06/21   1:10 PM   Complete  Bianca Greene CMA

## 2021-01-08 ENCOUNTER — ANCILLARY PROCEDURE (OUTPATIENT)
Dept: MRI IMAGING | Facility: CLINIC | Age: 51
End: 2021-01-08
Attending: NURSE PRACTITIONER
Payer: COMMERCIAL

## 2021-01-08 DIAGNOSIS — G89.29 CHRONIC PAIN IN LEFT SHOULDER: ICD-10-CM

## 2021-01-08 DIAGNOSIS — M25.512 CHRONIC PAIN IN LEFT SHOULDER: ICD-10-CM

## 2021-01-08 PROCEDURE — 73221 MRI JOINT UPR EXTREM W/O DYE: CPT | Mod: LT | Performed by: RADIOLOGY

## 2021-01-12 DIAGNOSIS — Z96.651 HX OF TOTAL KNEE ARTHROPLASTY, RIGHT: ICD-10-CM

## 2021-01-12 RX ORDER — GABAPENTIN 300 MG/1
CAPSULE ORAL
Qty: 30 CAPSULE | Refills: 0 | Status: SHIPPED | OUTPATIENT
Start: 2021-01-12 | End: 2021-12-10

## 2021-01-12 NOTE — TELEPHONE ENCOUNTER
Fax was received from pharmacy, pt taking at [facility] for continued nerve pain s/p RTKA on 10/15/20.  Rx renewed per plan.  Tanvi Neil RN

## 2021-01-20 ENCOUNTER — PRE VISIT (OUTPATIENT)
Dept: ORTHOPEDICS | Facility: CLINIC | Age: 51
End: 2021-01-20

## 2021-01-20 ENCOUNTER — OFFICE VISIT (OUTPATIENT)
Dept: ORTHOPEDICS | Facility: CLINIC | Age: 51
End: 2021-01-20
Payer: COMMERCIAL

## 2021-01-20 VITALS — BODY MASS INDEX: 34.43 KG/M2 | WEIGHT: 276.9 LBS | HEIGHT: 75 IN

## 2021-01-20 DIAGNOSIS — M71.9 DISORDER OF BURSAE AND TENDONS IN SHOULDER REGION: Primary | ICD-10-CM

## 2021-01-20 DIAGNOSIS — M67.919 DISORDER OF BURSAE AND TENDONS IN SHOULDER REGION: Primary | ICD-10-CM

## 2021-01-20 PROCEDURE — 99214 OFFICE O/P EST MOD 30 MIN: CPT | Mod: GC | Performed by: ORTHOPAEDIC SURGERY

## 2021-01-20 ASSESSMENT — MIFFLIN-ST. JEOR: SCORE: 2203.5

## 2021-01-20 NOTE — PROGRESS NOTES
"CHIEF CONCERN:  Left shoulder pain    HISTORY OF PRESENT ILLNESS:  50M RHD with h/o HTN who presents per Dr. Chang for assessment of L shoulder pain. Per MrLeo Aldridge he has had L shoulder pain for many years. He decided to do something about it recently. He localizes his pain to the anterior shoulder and \"deep.\" He says that sleeping on the L shoulder is most provocating for his pain. Notes that he always has some low grade pain. Takes OTC pain medication as well as Gabapentin at night; reports little benefit from these meds although the majority of his pain is in his R leg. He has not tried PT or injections for his shoulder symptoms.     He sees Dr. Chang for R knee issues - deformity as a child after an accident that required lengthening procedure and most recently R TKA with Dr. Chang on 10/15/20.     Ambulates with a cane.       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        Past Surgical History:   Procedure Laterality Date     C OPEN RX ANKLE DISLOCATN+FIXATN  1983     INJECT STEROID (LOCATION) Left 10/15/2020    Procedure: INJECTION, STEROID LEFT KNEE;  Surgeon: Tyrese Chang MD;  [facility]     OPTICAL TRACKING SYSTEM ARTHROPLASTY KNEE Right 10/15/2020    Procedure: Right total knee arthroplasty;  Surgeon: Tyrese Chang MD;  [facility]   - Thyroidectomy (1/2 of the thyroid); not cancer, per patient  - Deviated septum w/ ENT    Current Outpatient Medications   Medication Sig Dispense Refill     acetaminophen (TYLENOL) 325 MG tablet Take 2 tablets (650 mg) by mouth every 4 hours 100 tablet 0     Ascorbic Acid (VITAMIN C) 500 MG CAPS Take 500 mg by mouth every morning       aspirin 81 MG EC tablet Take 2 tablets (162 mg) by mouth daily 60 tablet 0     gabapentin (NEURONTIN) 300 MG capsule 1 po q hs 30 capsule 0     losartan (COZAAR) 50 MG tablet Take 50 mg by mouth daily       Multiple Vitamins-Minerals (MULTIVITAMIN ADULT PO)    "     omeprazole (PRILOSEC) 40 MG DR capsule Take 40 mg by mouth daily       oxyCODONE (ROXICODONE) 5 MG tablet Take 1-2 tablets (5-10 mg) by mouth every 4 hours as needed for moderate to severe pain 30 tablet 0     polyethylene glycol (MIRALAX) 17 g packet Take 17 g by mouth daily 7 packet 0     senna-docusate (SENOKOT-S/PERICOLACE) 8.6-50 MG tablet Take 1-2 tablets by mouth 2 times daily Take while on oral narcotics to prevent or treat constipation. 30 tablet 0     triamterene-hydrochlorothiazide (MAXZIDE) 75-50 MG per tablet Take 1 tablet by mouth daily            Allergies   Allergen Reactions     Nickel Rash       SOCIAL HISTORY:    Lives [facility] in a house with wife, kids, mother in law.  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 Fair amount of RLE pain that prevents him from driving.  Hobbies:  and enjoys [employer] activities. Netflix and video games.  EtOH: denies  Nicotine: denies.    FAMILY HISTORY: Reviewed in EMR      REVIEW OF SYSTEMS: Positive for that noted in past medical history and history of present illness and otherwise reviewed in EMR     PHYSICAL EXAM:    General: NAD  HEENT:  Resp:   LUE:  Inspection: no gross deformity  ROM:   - PROM: Abduction to 180 degrees  - AROM: FF to 180 degrees, Abduction to 120 degrees, ER to 60 degrees, IR to T9   *Symmetric on R side  Palpation: Sensation is intact to light touch in the median, radial, ulnar, musculocutaneous, and axillary nerve distributions. Tenderness at AC joint and intertubercular groove.  Strength: 5/5 strength with deltoid, triceps, biceps, wrist flexors, wrist extensors, , FPL, EPL, and IO on manual muscle testing  Circulation: 2+ radial pulse, fingers wwp, cap refill < 2sec     Special tests:    Biceps:    - + TTP at bicipital groove   - Speeds: -   - Yergasons: -   - Paras Sign: -      AC joint:   - + TTP at AC joint   - Cross-body adduction: -      Rotator Cuff   - Enrique's test (Supra): -   - ER at side  (Infra): -   - IR lag sign (Subscap): -   - Belly press (superior Subscap): -   - Lift off (inferior Subscap): mildly +      Labrum/SLAP   - [place]: +      Impingement:   - Neer: mildly +   - Enriquez: -     IMAGING:  MRI, L shoulder, 1/8/21  Overall, rotator cuff musculature without tears, edema. Some mild fatty infiltration of teres minor. Some inflammation around the biceps tendon in the groove.    XR, L shoulder, 1/20/21  No fractures, dislocations. No evidence of GH joint arthritis. Mild to moderate AC joint arthritis.    ASSESSMENT:  1. Left shoulder long head biceps disease  2. L AC joint arthritis    PLAN:  -- PT referral placed and printed so he can take to [facility]   -- US guided L biceps groove injection. Will check for availability with sports colleagues for today  -- F/up with us PRN pending response to injection and PT    Lovely Trujillo MD, PGY-1  Orthopedics    I have personally examined this patient and have reviewed the clinical presentation and progress note with the resident.  I agree with the treatment plan as outlined.  The plan was formulated with the resident on the day of the resident's note.

## 2021-01-20 NOTE — NURSING NOTE
"Reason For Visit:   Chief Complaint   Patient presents with     Consult     Left shoulder pain. Ref. Dr. Chang       PCP: Arun Whitley  Ref: Dr. Chang    ?  No  Occupation None.  Currently working? No.    Date of injury: None  Type of injury: Chronic.  Date of surgery: NA  Type of surgery: NA.  Smoker: No  Request smoking cessation information: No    Right hand dominant    SANE score  Affected shoulder: Left  Right shoulder SANE: 100  Left shoulder SANE: 90    Ht 1.9 m (6' 2.8\")   Wt 125.6 kg (276 lb 14.4 oz)   BMI 34.79 kg/m        Pain Assessment  Patient Currently in Pain: Yes(pain comes and goes)  0-10 Pain Scale: 3  Primary Pain Location: Shoulder(Left)  Pain Descriptors: Other (comment)(Deep pain, popping)  Aggravating Factors: Movement    Marni Cook ATC        "

## 2021-01-20 NOTE — LETTER
"    1/20/2021         RE: Aydin Aldridge  [address]  [place] [address]        Dear Colleague,    Thank you for referring your patient, Aydin Aldridge, to the [facility] [place]. Please see a copy of my visit note below.    CHIEF CONCERN:  Left shoulder pain    HISTORY OF PRESENT ILLNESS:  50M RHD with h/o HTN who presents per Dr. Chang for assessment of L shoulder pain. Per Mr. Aldridge he has had L shoulder pain for many years. He decided to do something about it recently. He localizes his pain to the anterior shoulder and \"deep.\" He says that sleeping on the L shoulder is most provocating for his pain. Notes that he always has some low grade pain. Takes OTC pain medication as well as Gabapentin at night; reports little benefit from these meds although the majority of his pain is in his R leg. He has not tried PT or injections for his shoulder symptoms.     He sees Dr. Chang for R knee issues - deformity as a child after an accident that required lengthening procedure and most recently R TKA with Dr. Chang on 10/15/20.     Ambulates with a cane.       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        Past Surgical History:   Procedure Laterality Date     C OPEN RX ANKLE DISLOCATN+FIXATN  1983     INJECT STEROID (LOCATION) Left 10/15/2020    Procedure: INJECTION, STEROID LEFT KNEE;  Surgeon: Tyrese Chang MD;  [facility]     OPTICAL TRACKING SYSTEM ARTHROPLASTY KNEE Right 10/15/2020    Procedure: Right total knee arthroplasty;  Surgeon: Tyrese Chang MD;  [facility]   - Thyroidectomy (1/2 of the thyroid); not cancer, per patient  - Deviated septum w/ ENT    Current Outpatient Medications   Medication Sig Dispense Refill     acetaminophen (TYLENOL) 325 MG tablet Take 2 tablets (650 mg) by mouth every 4 hours 100 tablet 0     Ascorbic Acid (VITAMIN C) 500 MG CAPS Take 500 mg by mouth every " morning       aspirin 81 MG EC tablet Take 2 tablets (162 mg) by mouth daily 60 tablet 0     gabapentin (NEURONTIN) 300 MG capsule 1 po q hs 30 capsule 0     losartan (COZAAR) 50 MG tablet Take 50 mg by mouth daily       Multiple Vitamins-Minerals (MULTIVITAMIN ADULT PO)        omeprazole (PRILOSEC) 40 MG DR capsule Take 40 mg by mouth daily       oxyCODONE (ROXICODONE) 5 MG tablet Take 1-2 tablets (5-10 mg) by mouth every 4 hours as needed for moderate to severe pain 30 tablet 0     polyethylene glycol (MIRALAX) 17 g packet Take 17 g by mouth daily 7 packet 0     senna-docusate (SENOKOT-S/PERICOLACE) 8.6-50 MG tablet Take 1-2 tablets by mouth 2 times daily Take while on oral narcotics to prevent or treat constipation. 30 tablet 0     triamterene-hydrochlorothiazide (MAXZIDE) 75-50 MG per tablet Take 1 tablet by mouth daily            Allergies   Allergen Reactions     Nickel Rash       SOCIAL HISTORY:    Lives [facility] in a house with wife, kids, mother in law.  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 Fair amount of RLE pain that prevents him from driving.  Hobbies:  and enjoys Taoist activities. Netflix and video games.  EtOH: denies  Nicotine: denies.    FAMILY HISTORY: Reviewed in EMR      REVIEW OF SYSTEMS: Positive for that noted in past medical history and history of present illness and otherwise reviewed in EMR     PHYSICAL EXAM:    General: NAD  HEENT:  Resp:   LUE:  Inspection: no gross deformity  ROM:   - PROM: Abduction to 180 degrees  - AROM: FF to 180 degrees, Abduction to 120 degrees, ER to 60 degrees, IR to T9   *Symmetric on R side  Palpation: Sensation is intact to light touch in the median, radial, ulnar, musculocutaneous, and axillary nerve distributions. Tenderness at AC joint and intertubercular groove.  Strength: 5/5 strength with deltoid, triceps, biceps, wrist flexors, wrist extensors, , FPL, EPL, and IO on manual muscle testing  Circulation: 2+ radial  pulse, fingers wwp, cap refill < 2sec     Special tests:    Biceps:    - + TTP at bicipital groove   - Speeds: -   - Yergasons: -   - Paras Sign: -      AC joint:   - + TTP at AC joint   - Cross-body adduction: -      Rotator Cuff   - Enrique's test (Supra): -   - ER at side (Infra): -   - IR lag sign (Subscap): -   - Belly press (superior Subscap): -   - Lift off (inferior Subscap): mildly +      Labrum/SLAP   - [place]: +      Impingement:   - Neer: mildly +   - Enriqeuz: -     IMAGING:  MRI, L shoulder, 1/8/21  Overall, rotator cuff musculature without tears, edema. Some mild fatty infiltration of teres minor. Some inflammation around the biceps tendon in the groove.    XR, L shoulder, 1/20/21  No fractures, dislocations. No evidence of GH joint arthritis. Mild to moderate AC joint arthritis.    ASSESSMENT:  1. Left shoulder long head biceps disease  2. L AC joint arthritis    PLAN:  -- PT referral placed and printed so he can take to [facility]   -- US guided L biceps groove injection. Will check for availability with sports colleagues for today  -- F/up with us PRN pending response to injection and PT    Lovely Trujillo MD, PGY-1  Orthopedics    I have personally examined this patient and have reviewed the clinical presentation and progress note with the resident.  I agree with the treatment plan as outlined.  The plan was formulated with the resident on the day of the resident's note.           Letitia Manning MD

## 2021-01-22 ENCOUNTER — OFFICE VISIT (OUTPATIENT)
Dept: ORTHOPEDICS | Facility: CLINIC | Age: 51
End: 2021-01-22
Payer: COMMERCIAL

## 2021-01-22 DIAGNOSIS — M75.22 TENDINITIS OF LONG HEAD OF BICEPS BRACHII OF LEFT SHOULDER: Primary | ICD-10-CM

## 2021-01-22 PROCEDURE — 76942 ECHO GUIDE FOR BIOPSY: CPT | Performed by: FAMILY MEDICINE

## 2021-01-22 PROCEDURE — 20550 NJX 1 TENDON SHEATH/LIGAMENT: CPT | Mod: LT | Performed by: FAMILY MEDICINE

## 2021-01-22 PROCEDURE — 99207 PR DROP WITH A PROCEDURE: CPT | Performed by: FAMILY MEDICINE

## 2021-01-22 RX ADMIN — TRIAMCINOLONE ACETONIDE 20 MG: 40 INJECTION, SUSPENSION INTRA-ARTICULAR; INTRAMUSCULAR at 15:01

## 2021-01-22 RX ADMIN — LIDOCAINE HYDROCHLORIDE 2 ML: 10 INJECTION, SOLUTION EPIDURAL; INFILTRATION; INTRACAUDAL; PERINEURAL at 15:01

## 2021-01-22 NOTE — LETTER
2021      RE: Aydin Aldridge  [address]  [place] [address]       PROCEDURE ENCOUNTER    [facility]  Orthopedics  Robert HAMEEDLeo DO Thom  2021     Name: Aydin Aldridge  MRN: [id number]  Age: 50 year old  : 1970    Referring provider:   Diagnosis: Proximal biceps tendinopathy of left shoulder    Left long head of biceps tendon sheath Injection - Ultrasound Guided  The patient was informed of the risks and the benefits of the procedure and a written consent was signed.  The patient s left anterior shoulder was prepped with chlorhexidine in sterile fashion.   20 mg of triamcinolone suspension was drawn up into a 5 mL syringe with 3 mL of 1% lidocaine.  3 mL of 1% lidocaine with 27-gauge 1.5 in needle for superficial anesthesia.  Injection was performed using sterile technique.  Initially, a wheal was made with syringe containing 1% lidocaine and into tissue overlying biceps tendon sheath.  Under ultrasound guidance a 1.5in 25-gauge needle was used to enter the tendon sheath at the level of bicipital groove of humeral head.  Needle placement was visualized and documented with ultrasound.  Ultrasound was necessary to ensure that steroid did not enter the tendon itself which could potentially cause tendon rupture.  Injection performed long axis to the probe.  Injection solution visualized within the joint space.  Images were permanently stored for the patient's record.  There were no complications. The patient tolerated the procedure well. There was negligible bleeding.   The patient was instructed to ice the hand upon leaving clinic and refrain from overuse over the next 3 days.   The patient was instructed to call or go to the emergency room with any unusual pain, swelling, redness, or if otherwise concerned.  A follow up appointment will be scheduled to evaluate response to the injection, and to assess range of motion and pain.  Robert Tomas DO [facility]  Primary Care Sports  Medicine  [facility]          Large Joint Injection/Arthocentesis    Date/Time: 1/22/2021 3:01 PM  Performed by: Robert Tomas DO  Authorized by: Robert Tomas DO     Needle Size:  22 G  Guidance: ultrasound    Approach:  Anterior  Location:  Shoulder   Location comment:  Left bicipital groove injection        Medications:  20 mg triamcinolone 40 MG/ML; 2 mL lidocaine (PF) 1 %  Outcome:  Tolerated well, no immediate complications  Procedure discussed: discussed risks, benefits, and alternatives    Consent Given by:  Patient  Timeout: timeout called immediately prior to procedure    Prep: patient was prepped and draped in usual sterile fashion     Scribed by Hope Shea ATC for Dr. Tomas on 1/22/21 at 3PM, based on the provider s statements to me.      Robert Tomas DO

## 2021-01-22 NOTE — NURSING NOTE
[address] [address]  Dept: [phone]  ______________________________________________________________________________    Patient: Aydin Aldridge   : 1970   MRN: [id number]   2021    INVASIVE PROCEDURE SAFETY CHECKLIST    Date: 21   Procedure: Left bicipital groove injection with Kenalog under ultrasound guidance  Patient Name: Aydin Aldridge  MRN: [id number]  YOB: 1970    Action: Complete sections as appropriate. Any discrepancy results in a HARD COPY until resolved.     PRE PROCEDURE:  Patient ID verified with 2 identifiers (name and  or MRN): Yes  Procedure and site verified with patient/designee (when able): Yes  Accurate consent documentation in medical record: Yes  H&P (or appropriate assessment) documented in medical record: Yes  H&P must be up to 20 days prior to procedure and updates within 24 hours of procedure as applicable: NA  Relevant diagnostic and radiology test results appropriately labeled and displayed as applicable: Yes  Procedure site(s) marked with provider initials: NA    TIMEOUT:  Time-Out performed immediately prior to starting procedure, including verbal and active participation of all team members addressing the following:Yes  * Correct patient identify  * Confirmed that the correct side and site are marked  * An accurate procedure consent form  * Agreement on the procedure to be done  * Correct patient position  * Relevant images and results are properly labeled and appropriately displayed  * The need to administer antibiotics or fluids for irrigation purposes during the procedure as applicable   * Safety precautions based on patient history or medication use    DURING PROCEDURE: Verification of correct person, site, and procedures any time the responsibility for care of the patient is transferred to another member of the care team.       Prior to injection, verified  patient identity using patient's name and date of birth.  Due to injection administration, patient instructed to remain in clinic for 15 minutes  afterwards, and to report any adverse reaction to me immediately.    Tendon sheath injection was performed.     Drug Amount Wasted:  Yes: 20 mg/ml   Vial/Syringe: Single dose vial  Expiration Date:  9/22      Hope Shea ATC  January 22, 2021

## 2021-01-22 NOTE — PROGRESS NOTES
PROCEDURE ENCOUNTER    [facility]  Orthopedics  Robert Tomas DO  2021     Name: Aydin Aldridge  MRN: [id number]  Age: 50 year old  : 1970    Referring provider:   Diagnosis: Proximal biceps tendinopathy of left shoulder    Left long head of biceps tendon sheath Injection - Ultrasound Guided  The patient was informed of the risks and the benefits of the procedure and a written consent was signed.  The patient s left anterior shoulder was prepped with chlorhexidine in sterile fashion.   20 mg of triamcinolone suspension was drawn up into a 5 mL syringe with 3 mL of 1% lidocaine.  3 mL of 1% lidocaine with 27-gauge 1.5 in needle for superficial anesthesia.  Injection was performed using sterile technique.  Initially, a wheal was made with syringe containing 1% lidocaine and into tissue overlying biceps tendon sheath.  Under ultrasound guidance a 1.5in 25-gauge needle was used to enter the tendon sheath at the level of bicipital groove of humeral head.  Needle placement was visualized and documented with ultrasound.  Ultrasound was necessary to ensure that steroid did not enter the tendon itself which could potentially cause tendon rupture.  Injection performed long axis to the probe.  Injection solution visualized within the joint space.  Images were permanently stored for the patient's record.  There were no complications. The patient tolerated the procedure well. There was negligible bleeding.   The patient was instructed to ice the hand upon leaving clinic and refrain from overuse over the next 3 days.   The patient was instructed to call or go to the emergency room with any unusual pain, swelling, redness, or if otherwise concerned.  A follow up appointment will be scheduled to evaluate response to the injection, and to assess range of motion and pain.  Robert Tomas DO [facility]  Primary Care Sports Medicine  [facility]          Large Joint  Injection/Arthocentesis    Date/Time: 1/22/2021 3:01 PM  Performed by: Robert Tomas DO  Authorized by: Robert Tomas DO     Needle Size:  22 G  Guidance: ultrasound    Approach:  Anterior  Location:  Shoulder   Location comment:  Left bicipital groove injection        Medications:  20 mg triamcinolone 40 MG/ML; 2 mL lidocaine (PF) 1 %  Outcome:  Tolerated well, no immediate complications  Procedure discussed: discussed risks, benefits, and alternatives    Consent Given by:  Patient  Timeout: timeout called immediately prior to procedure    Prep: patient was prepped and draped in usual sterile fashion     Scribed by Hope Shea ATC for Dr. Tomas on 1/22/21 at 3PM, based on the provider s statements to me.

## 2021-01-23 RX ORDER — LIDOCAINE HYDROCHLORIDE 10 MG/ML
2 INJECTION, SOLUTION EPIDURAL; INFILTRATION; INTRACAUDAL; PERINEURAL
Status: DISCONTINUED | OUTPATIENT
Start: 2021-01-22 | End: 2022-03-14

## 2021-01-23 RX ORDER — TRIAMCINOLONE ACETONIDE 40 MG/ML
20 INJECTION, SUSPENSION INTRA-ARTICULAR; INTRAMUSCULAR
Status: DISCONTINUED | OUTPATIENT
Start: 2021-01-22 | End: 2022-03-14

## 2021-02-02 ENCOUNTER — TRANSFERRED RECORDS (OUTPATIENT)
Dept: HEALTH INFORMATION MANAGEMENT | Facility: CLINIC | Age: 51
End: 2021-02-02

## 2021-02-08 ENCOUNTER — VIRTUAL VISIT (OUTPATIENT)
Dept: ORTHOPEDICS | Facility: CLINIC | Age: 51
End: 2021-02-08
Payer: COMMERCIAL

## 2021-02-08 DIAGNOSIS — Z96.651 HX OF TOTAL KNEE ARTHROPLASTY, RIGHT: Primary | ICD-10-CM

## 2021-02-08 PROCEDURE — 99212 OFFICE O/P EST SF 10 MIN: CPT | Mod: 95 | Performed by: NURSE PRACTITIONER

## 2021-02-08 NOTE — PROGRESS NOTES
"DOS: R TKA 10/15/2020    Aydin was contacted today for a phone follow up of his total knee replacement. He reports he is progressing and feels his therapy is \"moving along nicely\". He still has occasional activity related swelling and has increased achiness after therapy. He is walking daily. He has minimal night pain. He states his incisions are all healed.    R TKA    Dr. Chang reassured Aydin that he will continue to progress in therapy and strength for up to a year. Long term antibiotic use for dental work was discussed and recommended.    He was given names of joint surgeons here of [facility] to help if needed for any follow up.    I, Dr. Tyrese Chang, agree with above documentation and plan of care.  "

## 2021-02-08 NOTE — LETTER
"    2/8/2021         RE: Aydin Aldridge  [address]  [facility] [address]        Dear Colleague,    Thank you for referring your patient, Aydin Aldridge, to the [facility] [place]. Please see a copy of my visit note below.    DOS: R TKA 10/15/2020    Aydin was contacted today for a phone follow up of his total knee replacement. He reports he is progressing and feels his therapy is \"moving along nicely\". He still has occasional activity related swelling and has increased achiness after therapy. He is walking daily. He has minimal night pain. He states his incisions are all healed.    R TKA    Dr. Chang reassured Aydin that he will continue to progress in therapy and strength for up to a year. Long term antibiotic use for dental work was discussed and recommended.    He was given names of joint surgeons here of [facility] to help if needed for any follow up.    I, Dr. Tyrese Chang, agree with above documentation and plan of care.    "

## 2021-03-18 ENCOUNTER — COMMUNICATION - HEALTHEAST (OUTPATIENT)
Dept: ADMINISTRATIVE | Facility: CLINIC | Age: 51
End: 2021-03-18

## 2021-05-12 ENCOUNTER — AMBULATORY - HEALTHEAST (OUTPATIENT)
Dept: OTOLARYNGOLOGY | Facility: CLINIC | Age: 51
End: 2021-05-12

## 2021-05-12 DIAGNOSIS — H90.2 CONDUCTIVE HEARING LOSS, UNILATERAL: ICD-10-CM

## 2021-05-12 DIAGNOSIS — H93.13 TINNITUS OF BOTH EARS: ICD-10-CM

## 2021-05-18 ENCOUNTER — OFFICE VISIT - HEALTHEAST (OUTPATIENT)
Dept: OTOLARYNGOLOGY | Facility: CLINIC | Age: 51
End: 2021-05-18

## 2021-05-18 ENCOUNTER — OFFICE VISIT - HEALTHEAST (OUTPATIENT)
Dept: AUDIOLOGY | Facility: CLINIC | Age: 51
End: 2021-05-18

## 2021-05-18 ENCOUNTER — RECORDS - HEALTHEAST (OUTPATIENT)
Dept: ADMINISTRATIVE | Facility: OTHER | Age: 51
End: 2021-05-18

## 2021-05-18 DIAGNOSIS — H90.2 CONDUCTIVE HEARING LOSS, UNILATERAL: ICD-10-CM

## 2021-05-18 DIAGNOSIS — H90.11 CONDUCTIVE HEARING LOSS OF RIGHT EAR WITH UNRESTRICTED HEARING OF LEFT EAR: ICD-10-CM

## 2021-05-18 DIAGNOSIS — H93.11 TINNITUS, RIGHT: ICD-10-CM

## 2021-05-28 NOTE — PROGRESS NOTES
PULMONARY CLINIC FOLLOW UP NOTE    History:     HPI: Aydin Aldridge is a 48 y.o. male, lifelong non-smoker who is here for follow-up of chronic cough that started around October 2018.    Interval History:   He notes that his GERD is better controlled by taking omeprazole in am and famotidine at night. He notes that he his cough is intermittent. His cough is a dry cough. It is worse in the morning/afternoon. It does not wake him up from sleep. He notes that overall it is better overall than 10/2018.  He notes that he used to have seasonal allergies and has used flonase in the past.  Patient notes that he has epistaxis approximately a month but notes the bleeding is minimal.  He does not see ENT.    PMHx/PSHx:   GERD  Hypertension  History of nasal surgery    Social Hx:   Lifelong non-smoker  Works as a     ROS: 10 point review of system done. Pertinent findings are noted in the HPI.    Exam/Data:   /78   Pulse 100   Resp 20   Wt (!) 274 lb 11.2 oz (124.6 kg)   SpO2 97% Comment: RA  BMI 35.27 kg/m  , Body mass index is 35.27 kg/m .  GEN: comfortable, NAD  HEENT: NCAT, EMOI, mmm  CVS: S1S2, RRR  Lung: good air entry bilaterally, no wheezing  Abd: soft, nt, + BS. No masses  Ext: no c/c/e  Neuro: nonfocal  Skin: no visible rash  Vasc: intact radial pulses bilaterally  Musculoskeletal: FROM all extremities  Psych: normal affect    Data:   Labs and Imaging personally reviewed.  Pertinent findings include:      Xr Chest 2 Views    Result Date: 5/8/2019  EXAM: XR CHEST 2 VIEWS LOCATION: [facility] DATE/TIME: 5/8/2019 7:49 AM INDICATION: cough COMPARISON: None. FINDINGS: Heart size and pulmonary vessels are normal. Lungs are clear. There is mild wall thickening along the lateral left chest likely related to old rib irregularities or mild subpleural fat deposition.    PFT's  PFT DATA  FEV1/FVC is 87 and is normal.  FEV1 is 96% predicted and is normal.  FVC is 86% predicted and is  normal.  There was no improvement in spirometry after a single inhaled dose of bronchodilator.  TLC is 90% predicted and is normal.  RV is 84% predicted and is normal.  DLCO is 114% predicted and is normal when it is not corrected for hemoglobin.  The flow volume loop is normal yes  Impression:  Full Pulmonary Function Test is normal.    Assessment/Plan:     Aydin Aldridge is a 48 y.o. male, lifelong non-smoker with history of GERD and hypertension who had a cough that started back in 10/2018 the setting of URI symptoms.  He was started on albuterol inhaler and then PPI.  PFTs as noted above are completely normal.  Chest x-ray done recently shows no obvious infiltrates or congestion.    Recommendations:  Continue PPI  Avoid eating late at night  Start azelastine nasal spray  Start pt on singulair  Avoiding flonase b/c of epistaxis  Consider methacholine challenge test if cough persists  Consider referral to ENT for epistaxis    FOLLOW UP: 4 months    Vaibhav Mclain MD  Pulmonary and Critical Care Medicine  Electronically Signed on 5/10/2019    Current Outpatient Medications   Medication Sig Dispense Refill     ferrous sulfate 325 (65 FE) MG tablet Take 1 tablet by mouth daily with breakfast.       losartan (COZAAR) 50 MG tablet Take 1 tablet (50 mg total) by mouth daily. 30 tablet 12     MULTIVITAMIN ORAL Take by mouth.       omeprazole (PRILOSEC) 40 MG capsule Take 1 capsule (40 mg total) by mouth daily. 90 capsule 3     triamterene-hydrochlorothiazide (MAXZIDE) 75-50 mg per tablet Take 1 tablet by mouth daily. 30 tablet 12     No current facility-administered medications for this visit.      Allergies   Allergen Reactions     Nickel Rash       Meds and Allergies: See EHR for the updated medication list and Allergies. These were reviewed.     Much or all of the text in this note was generated through the use of the Dragon Dictate voice-to-text software. Errors in spelling or words which seem out of context are  unintentional. Sound alike errors, in particular, may have escaped editing.

## 2021-05-28 NOTE — PROGRESS NOTES
Preoperative Exam    Scheduled Procedure: Hemorrhoidectomy  Surgery Date:  5/23/19  Surgery Location: [facility], fax [phone]    Surgeon:  Dr. Mccoy    Assessment/Plan:     1. Pre-op evaluation  - Basic Metabolic Panel; Future    2. External hemorrhoids    3.  (EGD)- GERD with Esophagitis   Improving GERD, but not so much with the coughing.   Plan- start trial of the PPI taking two times a day for the next 7-10 days  Follow up with the treating pulmonologist if no significant improvement in the cough to decide for the next step.     4. Essential hypertension  Normotensive.        Surgical Procedure Risk: Low (reported cardiac risk generally < 1%)  Have you had prior anesthesia?: Yes  Have you or any family members had a previous anesthesia reaction:  No  Do you or any family members have a history of a clotting or bleeding disorder?: No  Cardiac Risk Assessment: no increased risk for major cardiac complications    Patient approved for surgery with general or local anesthesia.        Functional Status: Independent  Patient plans to recover at home with family.     Subjective:      Aydin Aldridge is a 48 y.o. male with a history of problematic external hemorrhoids associated with bleeding presenting for a preoperative consultation   Undergoing aforementioned procedure.        Review of Systems  Allergy: reviewed  General : negative  Ophthalmic : negative  ENT : negative  Respiratory : chronic cough, no shortness of breath, or wheezing  Cardiovascular : no chest pain or dyspnea on exertion  Gastrointestinal : chronic GERD site to be improving on omeprazole 40 mg daily but not necessarily any better with the coughing.  His pulmonologist has suggested to him trying a higher dose of the PPI, no abdominal pain, change in bowel habits,   Genito-Urinary :  no dysuria, trouble voiding, or hematuria  Dermatological : Having a palpable lesion on his back for which he would like to have it  checked.  Musculoskeletal : Has a palpable protuberance on his right thumb with some discomfort.  No lower extremity swelling  Neurological : negative  Hematological and Lymphatic : negative  Endocrine : negative      Pertinent History  Do you have difficulty breathing or chest pain after walking up a flight of stairs: No  History of obstructive sleep apnea: No  Steroid use in the last 6 months: Yes: Prednisone  Frequent Aspirin/NSAID use: used to but has stopped   Prior Blood Transfusion: Yes   Prior Blood Transfusion Reaction: No  If for some reason prior to, during or after the procedure, if it is medically indicated, would you be willing to have a blood transfusion?:  There is no transfusion refusal.    Current Outpatient Medications   Medication Sig Dispense Refill     albuterol (PROAIR HFA;PROVENTIL HFA;VENTOLIN HFA) 90 mcg/actuation inhaler INHALE 2 PUFFS BY MOUTH EVERY 6 HOURS AS NEEDED FOR WHEEZING. USE WITH SPACER 18 g 3     benzonatate (TESSALON PERLES) 100 MG capsule Take 1 capsule (100 mg total) by mouth 3 (three) times a day as needed for cough. 30 capsule 0     ferrous sulfate 325 (65 FE) MG tablet Take 1 tablet by mouth daily with breakfast.       losartan (COZAAR) 50 MG tablet Take 1 tablet (50 mg total) by mouth daily. 30 tablet 12     MULTIVITAMIN ORAL Take by mouth.       omeprazole (PRILOSEC) 40 MG capsule Take 1 capsule (40 mg total) by mouth daily. 90 capsule 3     triamterene-hydrochlorothiazide (MAXZIDE) 75-50 mg per tablet Take 1 tablet by mouth daily. 30 tablet 12     No current facility-administered medications for this visit.         Allergies   Allergen Reactions     Nickel Rash       Patient Active Problem List   Diagnosis     Essential hypertension     External hemorrhoids      (EGD)- GERD with Esophagitis        Past Medical History:   Diagnosis Date     Family history of myocardial infarction     Brother younger     GERD (gastroesophageal reflux disease)      History of  esophagogastroduodenoscopy (EGD) 2/12/2019    GERD with Esophagitis      Hypertension      Right Testicular cyst        Past Surgical History:   Procedure Laterality Date     NASAL SEPTUM SURGERY       right leg surgery       THYROIDECTOMY, PARTIAL      Managed Endocrinology        Social History     Socioeconomic History     Marital status:      Spouse name: Not on file     Number of children: Not on file     Years of education: Not on file     Highest education level: Not on file   Occupational History     Not on file   Social Needs     Financial resource strain: Not on file     Food insecurity:     Worry: Not on file     Inability: Not on file     Transportation needs:     Medical: Not on file     Non-medical: Not on file   Tobacco Use     Smoking status: Never Smoker     Smokeless tobacco: Never Used   Substance and Sexual Activity     Alcohol use: No     Drug use: Not on file     Sexual activity: Not on file   Lifestyle     Physical activity:     Days per week: Not on file     Minutes per session: Not on file     Stress: Not on file   Relationships     Social connections:     Talks on phone: Not on file     Gets together: Not on file     Attends Christianity service: Not on file     Active member of club or organization: Not on file     Attends meetings of clubs or organizations: Not on file     Relationship status: Not on file     Intimate partner violence:     Fear of current or ex partner: Not on file     Emotionally abused: Not on file     Physically abused: Not on file     Forced sexual activity: Not on file   Other Topics Concern     Not on file   Social History Narrative     Not on file             Objective:     Vitals:    04/26/19 0855   BP: 118/82   Pulse: 67   SpO2: 96%   Weight: (!) 272 lb (123.4 kg)         Physical Exam:  General Appearance:    Alert, well hydrated, no distress,    Eyes:    PERRL, conjunctiva/corneas clear,    Throat:   Lips, mucosa, and tongue normal; teeth and gums normal    Neck:   Supple, symmetrical, trachea midline, no adenopathy;        thyroid:  No enlargement/tenderness/nodules; no carotid    bruit or JVD   Lungs:     Clear to auscultation bilaterally, respirations unlabored   Heart:    Regular rate and rhythm, S1 and S2 normal, no murmur, rub   or gallop   Abdomen:     Soft, non-tender, normal bowel sounds, no rebound or guarding, no masses, no organomegaly   Extremities:  Palpable tiny cyst (ganglion) of the right thumb DIP, extremities normal, atraumatic, no cyanosis or edema   Skin:   Skin color, texture, turgor normal, no rashes or lesions, the area of concern on his back reveals a few acneiform lesions with no cyst or other lesions          Labs:  Recent Results (from the past 168 hour(s))   Basic Metabolic Panel    Collection Time: 04/26/19  9:28 AM   Result Value Ref Range    Sodium 139 136 - 145 mmol/L    Potassium 3.3 (L) 3.5 - 5.0 mmol/L    Chloride 101 98 - 107 mmol/L    CO2 25 22 - 31 mmol/L    Anion Gap, Calculation 13 5 - 18 mmol/L    Glucose 125 70 - 125 mg/dL    Calcium 10.1 8.5 - 10.5 mg/dL    BUN 13 8 - 22 mg/dL    Creatinine 1.12 0.70 - 1.30 mg/dL    GFR MDRD Af Amer >60 >60 mL/min/1.73m2    GFR MDRD Non Af Amer >60 >60 mL/min/1.73m2         There is no immunization history on file for this patient.        Electronically signed by Valentin Whitley MD 04/26/19 8:50 AM

## 2021-05-29 NOTE — ANESTHESIA PROCEDURE NOTES
Spinal Block    Patient location during procedure: OR  Start time: 5/23/2019 1:43 PM  End time: 5/23/2019 1:48 PM  Reason for block: primary anesthetic    Staffing:  Performing  Anesthesiologist: Tyrese Shipman MD    Preanesthetic Checklist  Completed: patient identified, risks, benefits, and alternatives discussed, timeout performed, consent obtained, airway assessed, oxygen available, suction available, emergency drugs available and hand hygiene performed  Spinal Block  Patient position: sitting  Prep: Betadine  Patient monitoring: heart rate, continuous pulse ox and blood pressure  Approach: midline  Location: L2-3  Injection technique: single-shot  Needle type: pencil-tip   Needle gauge: 24 G    Assessment  Sensory level: T8

## 2021-05-29 NOTE — ANESTHESIA PREPROCEDURE EVALUATION
Anesthesia Evaluation      Patient summary reviewed   History of anesthetic complications     Airway   Mallampati: II  Neck ROM: full   Pulmonary - negative ROS and normal exam                          Cardiovascular - normal exam  Exercise tolerance: > or = 4 METS  (+) hypertension, ,     Rhythm: regular  Rate: normal,         Neuro/Psych - negative ROS     Endo/Other    (+) obesity,      GI/Hepatic/Renal    (+) GERD,             Dental - normal exam                        Anesthesia Plan  Planned anesthetic: spinal    ASA 2     Anesthetic plan and risks discussed with: patient and spouse    Post-op plan: routine recovery

## 2021-05-29 NOTE — ANESTHESIA CARE TRANSFER NOTE
Last vitals:   Vitals:    05/23/19 1419   BP: 139/77   Pulse: 83   Resp: 16   Temp: 36.4  C (97.5  F)   SpO2: 100%     Patient's level of consciousness is awake  Spontaneous respirations: yes  Maintains airway independently: yes  Dentition unchanged: yes  Oropharynx: oropharynx clear of all foreign objects    QCDR Measures:  ASA# 20 - Surgical Safety Checklist: WHO surgical safety checklist completed prior to induction    PQRS# 430 - Adult PONV Prevention: 4558F - Pt received => 2 anti-emetic agents (different classes) preop & intraop  ASA# 8 - Peds PONV Prevention: NA - Not pediatric patient, not GA or 2 or more risk factors NOT present  PQRS# 424 - Berna-op Temp Management: 4559F - At least one body temp DOCUMENTED => 35.5C or 95.9F within required timeframe  PQRS# 426 - PACU Transfer Protocol: - Transfer of care checklist used  ASA# 14 - Acute Post-op Pain: ASA14B - Patient did NOT experience pain >= 7 out of 10

## 2021-05-29 NOTE — PROGRESS NOTES
"Cardiology Progress Note    Assessment:  Hypertension, essential, good control  Family history of premature coronary artery disease    Plan:  Continue current antihypertensive medications  CT coronary calcium scan to determine long risk of cardiac events.  May consider statin therapy if  elevated    Subjective:   This is 48 y.o. male who comes in today follow-up visit.  He reports no new cardiac symptoms.  He has been compliant with cardiac medications.  He denies chest pains or heart palpitations    Review of Systems:   General: WNL  Eyes: WNL  Ears/Nose/Throat: WNL  Lungs: Snoring  Heart: WNL  Stomach: Heartburn  Bladder: WNL  Muscle/Joints: Joint Pain  Skin: WNL  Nervous System: WNL  Mental Health: WNL     Blood: WNL    Objective:   /76 (Patient Site: Left Arm, Patient Position: Sitting, Cuff Size: Adult Large)   Pulse 76   Resp 16   Ht 6' 2.41\" (1.89 m)   Wt (!) 263 lb (119.3 kg)   BMI 33.40 kg/m    Physical Exam:  GENERAL: no distress  NECK: No JVD  LUNGS: Clear to auscultation.  CARDIAC: regular rhythm, S1 & S2 normal.  No heaves, thrills, gallops or murmurs.  ABDOMEN: flat, negative hepatosplenomegaly, soft and non-tender.  EXTREMITIES: No evidence of cyanosis, clubbing or edema.    Current Outpatient Medications   Medication Sig Dispense Refill     azelastine (ASTELIN) 137 mcg (0.1 %) nasal spray 1 spray into each nostril 2 (two) times a day. Use in each nostril as directed 60 mL 12     ferrous sulfate 325 (65 FE) MG tablet Take 1 tablet by mouth daily with breakfast.       losartan (COZAAR) 50 MG tablet Take 1 tablet (50 mg total) by mouth daily. 30 tablet 12     montelukast (SINGULAIR) 10 mg tablet Take 1 tablet (10 mg total) by mouth at bedtime. 90 tablet 1     MULTIVITAMIN ORAL Take 1 tablet by mouth daily.              omeprazole (PRILOSEC) 40 MG capsule Take 1 capsule (40 mg total) by mouth daily. 90 capsule 3     triamterene-hydrochlorothiazide (MAXZIDE) 75-50 mg per tablet Take 1 tablet by " mouth daily. 30 tablet 12     diazePAM (VALIUM) 5 MG tablet Take 1 tablet (5 mg total) by mouth 3 (three) times a day as needed for muscle spasms. 12 tablet 0     famotidine (PEPCID) 40 MG tablet Take 40 mg by mouth every evening.       oxyCODONE-acetaminophen (PERCOCET/ENDOCET) 5-325 mg per tablet Take 1-2 tablets by mouth every 4 (four) hours as needed for pain (moderate pain). 40 tablet 0     No current facility-administered medications for this visit.        Cardiographics:    Echo: May 2017  1. Normal left ventricular size and systolic performance with a visually estimated ejection fraction of 55-60%.   2. No significant valvular heart disease is identified on this study.   3. Normal right ventricular size and systolic performance.      Stress test: May 2017    Exercise stress EKG is negative for inducible myocardial ischemia.    Moderate diastolic hypertension was noted before, during, and after exercise.    The patient's exercise capacity is mildly impaired.        Lab Results:       Lab Results   Component Value Date    CHOL 178 12/09/2016    CHOL 171 03/06/2015     Lab Results   Component Value Date    HDL 41 12/09/2016    HDL 37 (L) 03/06/2015     Lab Results   Component Value Date    LDLCALC 115 12/09/2016    LDLCALC 104 03/06/2015     Lab Results   Component Value Date    TRIG 110 12/09/2016    TRIG 148 03/06/2015     No results found for: BNP    Mayco Song)  MD Nina

## 2021-05-29 NOTE — PATIENT INSTRUCTIONS - HE
Aydin Aldridge,    It was a pleasure to see you today at the [facility].     My recommendations after this visit include:    Coronary calcium scan    ZAINA Wood MD, FACC, PATY

## 2021-05-29 NOTE — TELEPHONE ENCOUNTER
Please call patient and advise to  potassium tabs from the pharmacy ( prescription sent) and to sip on Gatorade until mid night. Recheck potassium in the morning.

## 2021-05-29 NOTE — TELEPHONE ENCOUNTER
Who is calling:   [facility]  Reason for Call:   Patient is to have surgery tomorrow and Surge Prep is stating the potassium is too low and this needs to be addressed prior to surgery.  Please call the Surgery prep at  [phone]  Date of last appointment with primary care:  4/26/2019  Okay to leave a detailed message: Yes

## 2021-05-29 NOTE — TELEPHONE ENCOUNTER
Spoke with pt, he states the nurse earlier told him he does not need to worry about taking anything and to eat greens, he states he has potassium at home and we do not need to send in a prescription.

## 2021-05-29 NOTE — ANESTHESIA POSTPROCEDURE EVALUATION
Patient: Aydin Aldridge  HEMORRHOIDECTOMY  Anesthesia type: spinal    Patient location: Phase II Recovery  Last vitals:   Vitals Value Taken Time   /70 5/23/2019  3:01 PM   Temp 36.4  C (97.5  F) 5/23/2019  2:19 PM   Pulse 76 5/23/2019  3:08 PM   Resp 26 5/23/2019  3:08 PM   SpO2 97 % 5/23/2019  3:08 PM   Vitals shown include unvalidated device data.  Post vital signs: stable  Level of consciousness: awake and responds to simple questions  Post-anesthesia pain: pain controlled  Post-anesthesia nausea and vomiting: no  Pulmonary: unassisted, return to baseline  Cardiovascular: stable and blood pressure at baseline  Hydration: adequate  Anesthetic events: no    QCDR Measures:  ASA# 11 - Berna-op Cardiac Arrest: ASA11B - Patient did NOT experience unanticipated cardiac arrest  ASA# 12 - Berna-op Mortality Rate: ASA12B - Patient did NOT die  ASA# 13 - PACU Re-Intubation Rate: NA - No ETT / LMA used for case  ASA# 10 - Composite Anes Safety: ASA10A - No serious adverse event    Additional Notes:  SAB receding as expected. No concerns at this time.

## 2021-05-30 ENCOUNTER — RECORDS - HEALTHEAST (OUTPATIENT)
Dept: ADMINISTRATIVE | Facility: CLINIC | Age: 51
End: 2021-05-30

## 2021-05-30 VITALS — WEIGHT: 270 LBS | BODY MASS INDEX: 34.65 KG/M2 | HEIGHT: 74 IN

## 2021-05-30 VITALS — BODY MASS INDEX: 34.65 KG/M2 | HEIGHT: 74 IN | WEIGHT: 270 LBS

## 2021-05-31 VITALS — WEIGHT: 266.5 LBS | BODY MASS INDEX: 34.2 KG/M2 | HEIGHT: 74 IN

## 2021-05-31 VITALS — WEIGHT: 263.2 LBS | BODY MASS INDEX: 33.78 KG/M2 | HEIGHT: 74 IN

## 2021-05-31 VITALS — BODY MASS INDEX: 34.15 KG/M2 | HEIGHT: 74 IN

## 2021-05-31 VITALS — BODY MASS INDEX: 33.38 KG/M2 | WEIGHT: 260 LBS

## 2021-05-31 VITALS — HEIGHT: 74 IN | BODY MASS INDEX: 33.32 KG/M2 | WEIGHT: 259.6 LBS

## 2021-05-31 VITALS — WEIGHT: 266 LBS | BODY MASS INDEX: 34.15 KG/M2

## 2021-06-01 VITALS — WEIGHT: 258 LBS | HEIGHT: 74 IN | BODY MASS INDEX: 33.11 KG/M2

## 2021-06-01 VITALS — WEIGHT: 272.7 LBS | BODY MASS INDEX: 35.01 KG/M2

## 2021-06-01 NOTE — PROGRESS NOTES
RESPIRATORY CARE NOTE    Methacholine challenge testing completed by patient. Good patient effort and cooperation. Albuterol 2.5 mg neb given for bronchodilation post methacholine. PT tolerated well after bronchodilation. Test will be scanned into Epic. The results of this test meet the ATS standards for acceptability and repeatability.  PT returned to baseline and left in no distress.    Methacholine dosages administered:    0.0625mg  0.25mg  1mg  4mg  16mg    PAT Thomas  9/30/2019

## 2021-06-01 NOTE — PROGRESS NOTES
HPI: This patient is a 47 yo who presents for evaluation of the throat and epistaxis at the request of Dr. Mclain. There has been a dry cough and phlegm in throat for awhile.  He states cough has improved.  He thinks this is related to his chronic acid reflux for which he takes omeprazole daily.  Does get occasional hoarseness. Denies fevers, otalgia, weight loss, odynophagia, dysphagia, hemoptysis, and shortness of breath. Also complains of uvula swelling most often after a night of snoring or when he's sick.  It causes him to gag.  Notes he has epistaxis about 1 time per month mostly out of the right nostril. Has been going on since septoplasty 6 years ago; he does not remember where this was done. Uses Flonase occasionally. No other nose preparations.    Past medical history, surgical history, social history, family history, medications, and allergies have been reviewed with the patient and are documented above.    Review of Systems: a 10-system review was performed. Pertinent positives are noted in the HPI and on a separate scanned document in the chart.    PHYSICAL EXAMINATION:  GEN: no acute distress, normocephalic  EYES: extraocular movements are intact, pupils are equal and round. Sclera clear.   EARS: auricles are normally formed. The external auditory canals are clear with minimal to no cerumen. Tympanic membranes are intact bilaterally with no signs of infection, effusion, retractions, or perforations.  NOSE: anterior nares are patent. There are no masses or lesions. The septum is dry with diffuse exposed vessels bilaterally  OC/OP: clear, dentition is in good repair. The tongue and palate are fully mobile and symmetric. No masses or lesions. Cobblestoning of the posterior pharyngeal wall.  HP/L (scope): nasopharynx, base of tongue, vallecula, epiglottis, and pyriform sinuses are clear. The bilateral vocal folds are mobile and without lesion. There is interarytenoid edema and erythema.  NECK: soft and  supple. No lymphadenopathy or masses. Airway is midline.  NEURO: CN VII and XII symmetric. alert and oriented. No spontaneous nystagmus. Gait is normal.  PULM: breathing comfortably on room air, normal chest expansion with respiration  CARDS: no cyanosis or clubbing, normal carotid pulses    MEDICAL DECISION-MAKING: This patient is a 47yo with chronic laryngitis/globus sensation from acid reflux and epistaxis due to dry nose. Discussed with patient throat symptoms are related to his chronic reflux and encouraged him to continue with management.  As for his nose, recommend saline nasal sprays. Discussed risk benefits of uvula trimming if he wishes, but there is no guarantee this will completely resolve his symptoms.  He will schedule at the [facility] if he desires to move forward with this. Patient seen and evaluated with Connie Webb PA-C.

## 2021-06-01 NOTE — PROGRESS NOTES
PULMONARY CLINIC FOLLOW UP NOTE    History:     HPI: Aydin Aldridge is a 48 y.o. male, lifelong non-smoker who is here for follow-up of chronic cough that started around October 2018.    Interval History:   He is here for his scheduled follow up. He notes his cough is worst in the morning.  He cough is mostly exacerbated by smells. He is cough is dry. He notes that sometimes he chokes if he is eating or eating. It does not wake him up from sleep. He has seasonal allergies. He is not using flonase because of epistaxis.  He does not like using azelastine. He does not see ENT.  He has epsitaxis about once a month. His GERD is controlled. He used Montelukast but he is not consistent with it because it is hard.    PMHx/PSHx:   GERD  Hypertension  History of nasal surgery    Social Hx:   Lifelong non-smoker  Works as a     ROS: 10 point review of system done. Pertinent findings are noted in the HPI.    Exam/Data:   /80   Pulse 88   Resp 20   Wt (!) 275 lb 12.8 oz (125.1 kg)   SpO2 97% Comment: RA  BMI 35.02 kg/m  , Body mass index is 35.02 kg/m .  GEN: comfortable, NAD  HEENT: NCAT, EMOI, mmm  CVS: S1S2, RRR  Lung: good air entry bilaterally, no wheezing  Abd: soft, nt, + BS. No masses  Ext: no c/c/e  Neuro: nonfocal  Skin: no visible rash  Vasc: intact radial pulses bilaterally  Musculoskeletal: FROM all extremities  Psych: normal affect    Data:   Labs and Imaging personally reviewed.  Pertinent findings include:      Xr Chest 2 Views    Result Date: 5/8/2019  EXAM: XR CHEST 2 VIEWS LOCATION: [facility] DATE/TIME: 5/8/2019 7:49 AM INDICATION: cough COMPARISON: None. FINDINGS: Heart size and pulmonary vessels are normal. Lungs are clear. There is mild wall thickening along the lateral left chest likely related to old rib irregularities or mild subpleural fat deposition.    PFT's  PFT DATA  FEV1/FVC is 87 and is normal.  FEV1 is 96% predicted and is normal.  FVC is 86% predicted and is  normal.  There was no improvement in spirometry after a single inhaled dose of bronchodilator.  TLC is 90% predicted and is normal.  RV is 84% predicted and is normal.  DLCO is 114% predicted and is normal when it is not corrected for hemoglobin.  The flow volume loop is normal yes  Impression:  Full Pulmonary Function Test is normal.    Assessment/Plan:     Aydin Aldridge is a 48 y.o. male, lifelong non-smoker with history of GERD and hypertension who had a cough that started back in 10/2018 the setting of URI symptoms.  He was started on albuterol inhaler and then PPI.  PFTs as noted above are completely normal.  Chest x-ray done recently shows no obvious infiltrates or congestion.    Recommendations:  Continue PPI  Avoid eating late at night  D/c azelastine nasal spray  Start pt on singulair  Restart flonase b/c of epistaxis  Send for methacholine challenge test  ENT referral for epistaxis  Swallow evaluation    FOLLOW UP: 4 months    Vaibhav Mclain MD  Pulmonary and Critical Care Medicine  Electronically Signed on 9/13/2019    Current Outpatient Medications   Medication Sig Dispense Refill     azelastine (ASTELIN) 137 mcg (0.1 %) nasal spray 1 spray into each nostril 2 (two) times a day. Use in each nostril as directed 60 mL 12     ferrous sulfate 325 (65 FE) MG tablet Take 1 tablet by mouth daily with breakfast.       losartan (COZAAR) 50 MG tablet Take 1 tablet (50 mg total) by mouth daily. 30 tablet 12     montelukast (SINGULAIR) 10 mg tablet Take 1 tablet (10 mg total) by mouth at bedtime. 90 tablet 1     MULTIVITAMIN ORAL Take 1 tablet by mouth daily.              omeprazole (PRILOSEC) 40 MG capsule Take 1 capsule (40 mg total) by mouth daily. 90 capsule 3     triamterene-hydrochlorothiazide (MAXZIDE) 75-50 mg per tablet Take 1 tablet by mouth daily. 30 tablet 12     No current facility-administered medications for this visit.      Allergies   Allergen Reactions     Nickel Rash     And unpurified metals        Meds and Allergies: See EHR for the updated medication list and Allergies. These were reviewed.     Much or all of the text in this note was generated through the use of the Dragon Dictate voice-to-text software. Errors in spelling or words which seem out of context are unintentional. Sound alike errors, in particular, may have escaped editing.

## 2021-06-01 NOTE — PROGRESS NOTES
"Speech Language/Pathology  Videofluoroscopic Swallow Study     Problem:  Patient Active Problem List   Diagnosis     Essential hypertension     External hemorrhoids      (EGD)- GERD with Esophagitis      Family history of ischemic heart disease       Onset Date: 9/13/2019 (order date)  Reason for Evaluation: Assess for dysphagia  Pertinent History: Chronic cough (since October 2018).  Patient's lungs were clear on a recent CXR (5/8/19).  He has a history of GERD with esophagitis.  Current Diet: Regular textures and thin liquids  Baseline Diet: Regular textures and thin liquids    Patient is a 48-year-old male referred due to concerns of dysphagia with possible aspiration.  Patient reports that he frequently coughs and chokes when eating or drinking.  He states that this occurs with both solids and liquids, and can happen regardless of whether he is eating alone or with others.  Patient does note that he has a tendency to eat fast, and often takes large bites and sips.  He has GERD and takes omeprazole for this, but still experiences \"heartburn\" after his family's Sunday night pizza dinner.  The purpose of this study is to evaluate the physiology and function of patient's oropharyngeal swallow, determine his aspiration risk, and establish safe swallowing strategies as appropriate.    Patient presents as alert and cooperative during this evaluation.  He arrived to study alone.  An  was not applicable    Patient was given nectar-thick, thin, and pureed consistencies of barium, as well as a solid (i.e., barium-coated cracker).    Note: Patient had an intermittent dry cough and frequent harsh throat clearing prior to, during, and after study.    Oral Phase:    Dentition/Oral hygiene: Patient's dentition is intact. Oral hygiene was good.    Oral motor function was not impaired.     Bolus prep and oral control were not impaired. Mastication was safe and effective, and the patient used rotary " "chewing.    Anterior-Posterior transit was not impaired.    Premature spill beyond the base of the tongue into the valleculae did not occur with any consistency.    Tongue base retraction was not impaired.    Oral stasis did not occur with any consistency.    Pharyngeal Phase:    Aspiration did not occur with any consistency.     Laryngeal penetration did not occur with any consistency.    Swallow response was timely with all consistencies.    Epiglottic movement was complete consistently across texture trials.    Pharyngeal stasis did not occur with any consistency.    Pharyngeal constriction was not impaired.    Hyolaryngeal elevation was reduced. Hyolaryngeal excursion was reduced.    Cricopharyngeal function was not impaired. Cervical esophageal function was not impaired.    Assessment:    Patient demonstrated no oral dysphagia and no pharyngeal dysphagia.    Patient is at minimal aspiration risk with all intake.    Recommendations:    Plan: Continue current diet of Regular textures and thin liquids.    Strategies: Patient to follow standard safe swallowing precautions, including sitting fully upright for all intake, eating slowly, and taking small bites and sips.  Patient to avoid distractions (e.g., talking, watching TV, using smart phone/tablet device, etc.) when eating and drinking.  Patient to also follow standard GERD precautions.  He was provided with verbal education and written handouts on these.    Speech Therapy is not recommended at this time    Referrals: N/A.  If cough persists despite use of the above strategies, consider referral to GI.  Patient's symptoms are suggestive of inadequately controlled GERD.  Of note, patient arrived to appointment with a 20 oz bottle of Mountain Dew soda.  He also reported that he enjoys spicy foods and tomato-based sauces.  He alluded to the fact that he would be not be compliant with recommendation to avoid these kinds of foods (i.e., chuckled and stated, \"You know " "that's not gonna happen, right?\").    Reviewed history of swallow problem with patient and verbally explained roles of SLP and radiologist.  Verbally explained process of VFSS prior to administration of barium.  Verbally explained results and recommendations to patient.  SLP answered questions and stated that patient's referring physician, Dr. Mclain, will be notified of results and have access to this report in the EMR.  Patient verbalized understanding.    24 dysphagia minutes    Odilia Davis MA, CCC-SLP  "

## 2021-06-02 VITALS — BODY MASS INDEX: 35.27 KG/M2 | WEIGHT: 274.7 LBS

## 2021-06-02 VITALS — HEIGHT: 74 IN | WEIGHT: 263 LBS | BODY MASS INDEX: 33.75 KG/M2

## 2021-06-02 VITALS — WEIGHT: 274 LBS | BODY MASS INDEX: 35.18 KG/M2

## 2021-06-02 VITALS — HEIGHT: 74 IN | BODY MASS INDEX: 34.91 KG/M2 | WEIGHT: 272 LBS

## 2021-06-02 VITALS — BODY MASS INDEX: 35.35 KG/M2 | WEIGHT: 275.3 LBS

## 2021-06-02 VITALS — WEIGHT: 270.5 LBS | BODY MASS INDEX: 34.73 KG/M2

## 2021-06-02 VITALS — WEIGHT: 272 LBS | BODY MASS INDEX: 34.92 KG/M2

## 2021-06-02 VITALS — BODY MASS INDEX: 35.69 KG/M2 | WEIGHT: 278 LBS

## 2021-06-03 VITALS
OXYGEN SATURATION: 97 % | SYSTOLIC BLOOD PRESSURE: 128 MMHG | RESPIRATION RATE: 20 BRPM | HEART RATE: 88 BPM | BODY MASS INDEX: 35.02 KG/M2 | DIASTOLIC BLOOD PRESSURE: 80 MMHG | WEIGHT: 275.8 LBS

## 2021-06-04 VITALS
WEIGHT: 278.3 LBS | HEART RATE: 92 BPM | DIASTOLIC BLOOD PRESSURE: 80 MMHG | TEMPERATURE: 97.3 F | RESPIRATION RATE: 22 BRPM | OXYGEN SATURATION: 97 % | BODY MASS INDEX: 35.71 KG/M2 | HEIGHT: 74 IN | SYSTOLIC BLOOD PRESSURE: 108 MMHG

## 2021-06-04 VITALS
WEIGHT: 282.1 LBS | TEMPERATURE: 98.5 F | HEART RATE: 80 BPM | DIASTOLIC BLOOD PRESSURE: 78 MMHG | RESPIRATION RATE: 18 BRPM | BODY MASS INDEX: 35.08 KG/M2 | SYSTOLIC BLOOD PRESSURE: 122 MMHG

## 2021-06-06 NOTE — TELEPHONE ENCOUNTER
Refill Approved    Rx renewed per Medication Renewal Policy. Medication was last renewed on 2/25/19.    Ivette Mccall, [facility] Triage/Med Refill 3/14/2020     Requested Prescriptions   Pending Prescriptions Disp Refills     omeprazole (PRILOSEC) 40 MG capsule 90 capsule 3     Sig: Take 1 capsule (40 mg total) by mouth daily.       GI Medications Refill Protocol Passed - 3/11/2020 12:35 PM        Passed - PCP or prescribing provider visit in last 12 or next 3 months.     Last office visit with prescriber/PCP: 2/21/2019 Valentin Whitley MD OR same dept: Visit date not found OR same specialty: 2/21/2019 Valentin Whitley MD  Last physical: 4/26/2019 Last MTM visit: Visit date not found   Next visit within 3 mo: Visit date not found  Next physical within 3 mo: Visit date not found  Prescriber OR PCP: Valentin Whitley MD  Last diagnosis associated with med order: 1.  (EGD)- GERD with Esophagitis   - omeprazole (PRILOSEC) 40 MG capsule; Take 1 capsule (40 mg total) by mouth daily.  Dispense: 90 capsule; Refill: 3    If protocol passes may refill for 12 months if within 3 months of last provider visit (or a total of 15 months).

## 2021-06-07 NOTE — TELEPHONE ENCOUNTER
Central PA team  [phone]  Pool: HE PA MED ([id number])          PA has been initiated.       PA form completed and faxed insurance via Cover My Meds     Key:  [id number]     Medication:  Omeprazole 40MG dr capsules    Insurance:  [employer] [place]        Response will be received via fax and may take up to 5-10 business days depending on plan

## 2021-06-07 NOTE — TELEPHONE ENCOUNTER
Prior Authorization Request  Who s requesting:  Pharmacy  Pharmacy Name and Location: [id number]  Medication Name: Omeprazole 40 mg  Insurance Plan: [facility]  Insurance Member ID Number:  [id number]  CoverMyMeds Key: N/A  Informed patient that prior authorizations can take up to 10 business days for response:   No  Okay to leave a detailed message: No

## 2021-06-09 NOTE — TELEPHONE ENCOUNTER
"Called to patient. Patient stated that he is out of this medication and is needing this refilled today.     Patient stated he was not sure of he will be able to make it in tomorrow but scheduled an appointment. Also scheduled  for Monday incase he was not able to come in tomorrow.    Patient requesting this be sent in today otherwise his stomach \"starts turning acid all day and is very uncomfortable\"    Please advise on refill, patient scheduled for 6/26     omeprazole (PRILOSEC) 40 MG capsule  90 capsule  0  3/14/2020   No    Sig - Route: Take 1 capsule (40 mg total) by mouth daily. - Oral    Sent to pharmacy as: omeprazole 40 mg capsule,delayed release (PriLOSEC)    E-Prescribing Status: Receipt confirmed by pharmacy (3/14/2020  3:40 PM CDT)        "

## 2021-06-09 NOTE — TELEPHONE ENCOUNTER
Authorizing: Valentin Whitley MD in [facility]/OB     Referral: [id number] (Pending Review)       Diagnosis: H/O partial thyroidectomy [Z90.09]     Please review records and advise on if Padma is able to consult or if patient should be seen by [facility]

## 2021-06-09 NOTE — PROGRESS NOTES
Assessment/Plan:        1.  (EGD)- GERD with Esophagitis   Refill  - omeprazole (PRILOSEC) 40 MG capsule; Take 1 capsule (40 mg total) by mouth daily.  Dispense: 90 capsule; Refill: 3    2. Essential hypertension  At goal  Continue on Maxide  Recheck labs  - Basic Metabolic Panel  - Lipid Profile    3. H/O partial thyroidectomy  - Ambulatory referral to Endocrinology  Recheck lab  - Thyroid Stimulating Hormone (TSH)        At the conclusion of the encounter the plan of care, disposition and all questions were answered and reviewed, and the patient acknowledged understanding and was involved in the decision making regarding the overall care plan.           Subjective:    Patient ID:   Aydin Aldridge is a 49 y.o. male comes in follow-up for med check and review      Refill on omeprazole taking for GERD, for which has been and needing to take daily.    He is on Maxide for treatment of hypertension needing his labs rechecked.  Otherwise doing well with no concerns or side effects has been keeping his blood pressure at goal.    History of partial thyroidectomy never received a call to follow-up with endocrinology despite the referral placed a few months back.  Asking to recheck his thyroid level      Review of Systems  Allergy: reviewed  General : negative  A complete 5 point review of systems was obtained and is negative other than what is stated in the HPI.      The following patient's history were reviewed and updated as appropriate:   He  has a past medical history of External hemorrhoids, Family history of myocardial infarction, GERD (gastroesophageal reflux disease), History of anesthesia complications, History of esophagogastroduodenoscopy (EGD) (2/12/2019), History of transfusion, Hypertension, and Right Testicular cyst..      Outpatient Encounter Medications as of 6/29/2020   Medication Sig Dispense Refill     ferrous sulfate 325 (65 FE) MG tablet Take 1 tablet by mouth daily with breakfast.       losartan  (COZAAR) 50 MG tablet TAKE 1 TABLET(50 MG TOTAL) BY MOUTH DAILY 30 tablet 3     MULTIVITAMIN ORAL Take 1 tablet by mouth daily.              omeprazole (PRILOSEC) 40 MG capsule Take 1 capsule (40 mg total) by mouth daily. 90 capsule 0     triamterene-hydrochlorothiazide (MAXZIDE) 75-50 mg per tablet TAKE 1 TABLET BY MOUTH DAILY 30 tablet 3     azelastine (ASTELIN) 137 mcg (0.1 %) nasal spray 1 spray into each nostril 2 (two) times a day. Use in each nostril as directed 60 mL 12     fluticasone propionate (FLONASE ALLERGY RELIEF) 50 mcg/actuation nasal spray 1 spray into each nostril daily. 16 g 12     montelukast (SINGULAIR) 10 mg tablet Take 1 tablet (10 mg total) by mouth at bedtime. 90 tablet 3     No facility-administered encounter medications on file as of 6/29/2020.          Objective:   /78   Pulse 80   Temp 98.5  F (36.9  C) (Oral)   Resp 18   Wt (!) 282 lb 1.6 oz (128 kg)   BMI 35.82 kg/m        Physical Exam  General Appearance:    Alert, well hydrated, no distress   Throat:   mucous membranes moist, pharynx normal without lesions   Neck:   Supple, symmetrical, trachea midline, no adenopathy;     thyroid:  no enlargement/tenderness/nodules;    Lungs:     clear to auscultation, no wheezes, rales or rhonchi, symmetric air entry     Heart:    Regular rate and rhythm, S1 and S2 normal, no murmur, rub   or gallop, no edema    Skin:   Skin color, texture, turgor normal, no rashes or lesions

## 2021-06-09 NOTE — TELEPHONE ENCOUNTER
Left message for pt to call back. Pt has appointment today, but not sure if he is actually coming as appointment is also scheduled for Monday. Please ask screening questions: new cough/ shortness of breath within past 14 days. Pt will need to wear mask and arrive at 315

## 2021-06-09 NOTE — TELEPHONE ENCOUNTER
Refill Request: Omeprazole  Last filled: 3.14.2020 disp 90 no refills  Last visit: 2.21.2019  (EGD)- GERD with Esophagitis   Continue current medical treatment with daily omeprazole.  - omeprazole (PRILOSEC) 40 MG capsule; Take 1 capsule (40 mg total) by mouth daily.  Dispense: 90 capsule; Refill: 3        No upcoming appointments

## 2021-06-09 NOTE — TELEPHONE ENCOUNTER
Called and lvm for patient to call back to schedule an appointment.    Please assist in scheduling

## 2021-06-11 ENCOUNTER — OFFICE VISIT - HEALTHEAST (OUTPATIENT)
Dept: AUDIOLOGY | Facility: CLINIC | Age: 51
End: 2021-06-11

## 2021-06-11 DIAGNOSIS — H90.11 CONDUCTIVE HEARING LOSS OF RIGHT EAR WITH UNRESTRICTED HEARING OF LEFT EAR: ICD-10-CM

## 2021-06-11 NOTE — PATIENT INSTRUCTIONS - HE
Aydin Aldridge,    It was a pleasure to see you today at the [facility].     My recommendations after this visit include:    Continue current blood pressure medications  Asking family doctor about medications for ED and potential ENT referral for  ringing the ears  ZAINA Wood MD, FACC, [facility]

## 2021-06-11 NOTE — PROGRESS NOTES
"Aydin Aldridge is a 49 y.o. male who is being evaluated via a billable video visit.      The patient has been notified of following:     \"This video visit will be conducted via a call between you and your physician/provider. We have found that certain health care needs can be provided without the need for an in-person physical exam.  This service lets us provide the care you need with a video conversation.  If a prescription is necessary we can send it directly to your pharmacy.  If lab work is needed we can place an order for that and you can then stop by our lab to have the test done at a later time.    Video visits are billed at different rates depending on your insurance coverage. Please reach out to your insurance provider with any questions.    If during the course of the call the physician/provider feels a video visit is not appropriate, you will not be charged for this service.\"    Patient has given verbal consent to a Video visit? Yes  How would you like to obtain your AVS? AVS Preference: [employer].  If dropped by the video visit, the video invitation should be sent to: Text to cell phone: [phone]   Will anyone else be joining your video visit? No        Video Start Time: 0908      Reason for visit      1. Thyroid nodule        HPI     Aydin Aldridge is a very pleasant 49 y.o. old male who presents for follow up.  SUMMARY:    Dimitris is contacted today via Video Visit to establish care for thyroid nodule and post ernesto-thyroidectomy. He reports a hx of stable Thyroid labs. He is having no difficulties swallowing at present, although he has a hx of feeling as though there was something interfering when he eats. Swallow study was grossly normal. Current TSH is 1.12 and fT4 is 1.0. His last US done in 2018 shows R thyroid nodule which was deemed stable at the time.     Past Medical History     Patient Active Problem List   Diagnosis     Essential hypertension     External hemorrhoids      (EGD)- GERD with " Esophagitis      Family history of ischemic heart disease     H/O colonoscopy     Allergic rhinitis     Esophageal reflux     Post-traumatic osteoarthritis of both knees     Post-traumatic osteoarthritis of right knee       Family History       family history includes Asthma in his paternal grandmother; Heart disease in his father; Heart failure in his brother; Sleep apnea in his maternal uncle; Snoring in his father.    Social History      reports that he has never smoked. He has never used smokeless tobacco. He reports that he does not drink alcohol or use drugs.      Review of Systems     Patient denies fatigue, weight changes, heat/cold intolerance, bowel/skin changes or CVS symptoms.   Remainder per HPI and per attached intake form.      Vital Signs     There were no vitals taken for this visit.  Wt Readings from Last 3 Encounters:   06/29/20 (!) 282 lb 1.6 oz (128 kg)   09/13/19 (!) 275 lb 12.8 oz (125.1 kg)   06/07/19 (!) 263 lb (119.3 kg)             Assessment     1. Thyroid nodule            Plan     Will get another Thyroid US done to update the nodule and will plan on every other year unless Radiology determines that it should be done more often. Pt gets Thyroid labs done with yearly Physical with PCP. Discussed sux of Hyper and Hypothyroidism and to alert me with any of this going on. Verbalized understanding.             Lab Results     TSH   Date Value Ref Range Status   06/29/2020 1.12 0.30 - 5.00 uIU/mL Final     No results found for: THYROIDAB    No results found for: O8MOZYM    Imaging Results   Last thyroid ultrasound:  Results for orders placed during the hospital encounter of 06/27/18   US Thyroid    Narrative  US THYROID  6/27/2018 11:00 AM    INDICATION: Nontoxic single thyroid nodule  TECHNIQUE: Routine.  COMPARISON: 06/30/2017    FINDINGS:  RIGHT thyroid lobe measures 4.7 x 2.2 x 2.3 cm. Heterogeneous echotexture with a focal nodule.  Nodule: 1.1 x 0.9 x 0.7 cm nodule at the inferior right  thyroid lobe, previously 1.1 x 0.9 x 0.6 cm   Composition: Solid or almost completely solid 2   Echogenicity: Hyperechoic or isoechoic 1   Shape: Wider-than-tall 0   Margin: Smooth 0   Echogenic Foci: None, or large comet-tail artifacts 0       LEFT thyroid lobe is surgically absent. No residual soft tissue in the thyroidectomy bed.    Isthmus measures 5 mm. No additional findings.    No cervical lymphadenopathy.      Impression CONCLUSION:  1.  A 1.1 cm right thyroid nodule has not significantly changed in size.  2.  Left thyroidectomy. No residual soft tissue in the thyroidectomy bed.       Last thyroid nuclear scan:  No results found for this or any previous visit.    Current Medications     Outpatient Medications Prior to Visit   Medication Sig Dispense Refill     ferrous sulfate 325 (65 FE) MG tablet Take 1 tablet by mouth daily with breakfast.       losartan (COZAAR) 50 MG tablet TAKE 1 TABLET(50 MG) BY MOUTH DAILY 90 tablet 1     MULTIVITAMIN ORAL Take 1 tablet by mouth daily.              omeprazole (PRILOSEC) 40 MG capsule Take 1 capsule (40 mg total) by mouth daily. 90 capsule 3     triamterene-hydrochlorothiazide (MAXZIDE) 75-50 mg per tablet TAKE 1 TABLET BY MOUTH DAILY 90 tablet 1     fluticasone propionate (FLONASE ALLERGY RELIEF) 50 mcg/actuation nasal spray 1 spray into each nostril daily. (Patient taking differently: 1 spray into each nostril as needed. ) 16 g 12     montelukast (SINGULAIR) 10 mg tablet Take 1 tablet (10 mg total) by mouth at bedtime. 90 tablet 3     azelastine (ASTELIN) 137 mcg (0.1 %) nasal spray 1 spray into each nostril 2 (two) times a day. Use in each nostril as directed 60 mL 12     No facility-administered medications prior to visit.            Additional provider notes:       Video-Visit Details    Type of service:  Video Visit  Video End Time :  0930    Originating Location (pt. Location): Home    Distant Location (provider location):  [employer]  used for Video Visit: Janet LAY-C

## 2021-06-11 NOTE — PROGRESS NOTES
Review of systems done with patoent over the phone and all within normal limits. He was unable to do his vitals today.

## 2021-06-11 NOTE — PROGRESS NOTES
Dear Dr. Mayco Morales ([facility]) Md Nina  [address] [facility]  [address]  [place] [address]    Thank you for the opportunity to participate in the care of Mr. Aydin Aldridge.    He is a 46 y.o. male who comes to the clinic with a chief complaint of excessive daytime sleepiness that is been going on for 15 years.  The patient has been told that he does have significant pauses in his breathing during sleep followed by loud snoring.  He still feels tired despite adequate amounts of sleep.  The patient was sent by the cardiologist to see if he has obstructive sleep apnea as a factor in exacerbating his high blood pressure.  His review of system is significant for hearing changes and he accepted my offer to get a hearing test.  He also is being followed by the cardiologist for occasional chest pain.  He will follow-up with his primary care doctor to assess his occasional stomach pain.     Ideal Sleep-Wake Cycle(devoid of societal pressure):    Patient would try to initiate sleep at around 10 PM with a sleep latency of 15 minutes. The patient would have 2-6 nocturnal awakening. Final wake up time is around 8 AM.    Past Medical History  Past Medical History:   Diagnosis Date     Family history of myocardial infarction     Brother younger     GERD (gastroesophageal reflux disease)      Hypertension         Past Surgical History  History reviewed. No pertinent surgical history.     Meds  Current Outpatient Prescriptions   Medication Sig Dispense Refill     hydroCHLOROthiazide (HYDRODIURIL) 25 MG tablet Take 25 mg by mouth daily.       losartan (COZAAR) 50 MG tablet Take 1 tablet (50 mg total) by mouth daily. 30 tablet 12     omeprazole (PRILOSEC) 40 MG capsule TK 1 C PO QD  3     potassium chloride SA (K-DUR,KLOR-CON) 10 MEQ tablet   1     triamterene-hydrochlorothiazide (MAXZIDE) 75-50 mg per tablet Take 1 tablet by mouth daily. 90 tablet 3     No current facility-administered medications for this visit.          Allergies  Nickel     Social History  Social History     Social History     Marital status:      Spouse name: N/A     Number of children: N/A     Years of education: N/A     Occupational History     Not on file.     Social History Main Topics     Smoking status: Never Smoker     Smokeless tobacco: Not on file     Alcohol use No     Drug use: Not on file     Sexual activity: Not on file     Other Topics Concern     Not on file     Social History Narrative        Family History  Family History   Problem Relation Age of Onset     Heart disease Father      Snoring Father      Heart failure Brother      Sleep apnea Maternal Uncle         Review of Systems:  Constitutional: Negative except as noted in HPI.   Eyes: Negative except as noted in HPI.   ENT: Negative except as noted in HPI.   Cardiovascular: Negative except as noted in HPI.   Respiratory: Negative except as noted in HPI.   Gastrointestinal: Negative except as noted in HPI.   Genitourinary: Negative except as noted in HPI.   Musculoskeletal: Negative except as noted in HPI.   Integumentary: Negative except as noted in HPI.   Neurological: Negative except as noted in HPI.   Psychiatric: Negative except as noted in HPI.   Endocrine: Negative except as noted in HPI.   Hematologic/Lymphatic: Negative except as noted in HPI.      STOP BANG 6/30/2017   Do you snore loudly (louder than talking or loud enough to be heard through closed doors)? 1   Do you often feel tired, fatigued, or sleepy during daytime? 1   Has anyone observed you stop breathing in your sleep? 1   Do you have or are you being treated for high blood pressure? 1   BMI more than 35 kg/m2 0   Age over 50 years old? 0   Neck circumference greater than 16 inches? 1   Gender male? 1   Total Score 6   Epworths Sleepiness Scale 6/30/2017   Sitting and reading 0   Watching TV 1   Sitting, inactive in a public place (e.g. a theatre or a meeting) 0   As a passenger in a car for an hour without a break  "0   Lying down to rest in the afternoon when circumstances permit 3   Sitting and talking to someone 0   Sitting quietly after a lunch without alcohol 1   In a car, while stopped for a few minutes in traffic 0   Total score 5   Rooming 6/30/2017   Usual bedtime 10   Sleep Latency 15 mn   Awakenings 2-6   Wake Up Time 8   Energy Drinks 0   Coffee 0   Cola 0   Difficulty falling asleep No   Difficulty staying asleep Yes   Excessive daytime tiredness Yes   Excessive daytime sleepiness Yes   Dozing off while driving No   Shift Worker No   Sleep Walking? No   Sleep Talking? No   Kicking or punching? Yes   Restless legs symptoms No       Physical Exam:  /86  Pulse 81  Ht 6' 2\" (1.88 m)  Wt (!) 266 lb 8 oz (120.9 kg)  SpO2 96%  BMI 34.22 kg/m2  BMI:Body mass index is 34.22 kg/(m^2).   GEN: NAD, obese  Head: Normocephalic.  EYES: PERRLA, EOMI  ENT: Oropharynx is clear, mallampatti class 4+ airway.   Nasal mucosa is moist without erythema  Neck : Thyroid is within normal limits. Neckc irc 19 inches  CV: Regular rate and rhythm, S1 & S2 positive.  LUNGS: Bilateral breathsounds heard.   ABDOMEN: Positive bowel sounds in all quadrants, soft, no rebound or guarding  MUSCULOSKELETAL: Bilateral trace leg swelling  SKIN: warm, dry, no rashes  Neurological: Alert, oriented to time, place, and person.  Psych: normal mood, normal affect     Labs/Studies:     Lab Results   Component Value Date    WBC 7.1 07/24/2012    HGB 14.2 07/24/2012    HCT 41.2 07/24/2012    MCV 87 07/24/2012     07/24/2012         Chemistry        Component Value Date/Time     04/21/2017 1401    K 3.9 04/21/2017 1401     04/21/2017 1401    CO2 28 04/21/2017 1401    BUN 11 04/21/2017 1401    CREATININE 1.24 04/21/2017 1401    GLU 96 04/21/2017 1401        Component Value Date/Time    CALCIUM 9.7 04/21/2017 1401    ALKPHOS 76 04/21/2017 1401    AST 33 04/21/2017 1401    ALT 71 (H) 04/21/2017 1401    BILITOT 0.6 04/21/2017 1401    "         No results found for: FERRITIN  Lab Results   Component Value Date    TSH 2.25 04/21/2017         Assessment and Plan:  In summary Aydin Aldridge is a 46 y.o. year old male here for sleep disturbance.  1.  Suspected sleep apnea   Mr. Aydin Aldridge has high risk for obstructive sleep apnea based on the history of witnessed apnea, snoring and a crowded airway. I educated the patient on the underlying pathophysiology of obstructive sleep apnea. We reviewed the risks associated with sleep apnea, including increased cardiovascular risk and overall death. We talked about treatments briefly. I recommend getting a baseline nocturnal polysomnography. The patient should return to the clinic to discuss results and treatment option in a patient-centered approach.  The patient will need ENT evaluation secondary to history of nasal septal surgery.  2.  Hypersomnia  3.  Snoring  4.  Other sleep disturbance    History of cranial facial surgery? Yes. History of surgical correction of deviated septum.    Patient verbalized understanding of these issues, agrees with the plan and all questions were answered today. Patient was given an opportuntity to voice any other symptoms or concerns not listed above. Patient did not have any other symptoms or concerns.      Patient told to return in one week after the sleep study is interpreted. Patient instructed to stop at  to schedule appointment before leaving today.       Raúl Edwards DO  Board Certified in Internal Medicine and Sleep Medicine  [facility].    (Note created with Dragon voice recognition and unintended spelling errors and word substitutions may occur)

## 2021-06-12 NOTE — PROGRESS NOTES
Proposed Surgery/ Procedure: Right knee replacement  Date of Surgery/ Procedure: 10/15/20  Time of Surgery/ Procedure: unsure  Hospital/Surgical Facility: Somewhere at the [facility]  Surgery Fax Number:   Primary Physician: Valentin Whitley MD      Have you ever had a heart attack or stroke? No  Have you ever had surgery on your heart or blood vessels, such as a stent, coronary (heart) bypass, or surgery on an artery in the head, neck, heart, or legs? No  Do you have chest pain when you are physically active? No  Do you have a history of heart failure? No  Do you currently have a cold, bronchitis, or symptoms of other respiratory (head and chest) infections? No  Do you have a cough, shortness of breath, or wheezing? No  Do you or anyone in your family have a history of blood clots? No  Do you or anyone in your family have a serious bleeding problem, such as long-lasting bleeding after surgeries or cuts? No  Have you ever had anemia or been told to take iron pills? YES: takes iron pills, has been taking since a sleep study   Have you had any abnormal blood loss such as black, tarry or bloody stools, or abnormal vaginal bleeding?No  Have you ever had a blood transfusion? YES at age 12 when he broke his leg  Have you ever had a transfusion reaction? NO: none   Are you willing to have a blood transfusion if it is medically needed before, during, or after your surgery? Yes  Have you or anyone in your family ever had problems with anesthesia (sedation for surgery)? No  Do you have sleep apnea, excessive snoring, or daytime drowsiness? YES he does snore  Do you have a CPAP machine? NO: none  Do you have any artifical heart valves or other implanted medical devices, such as a pacemaker, defibrillator, or continuous glucose monitor? No  Do you have any artifical joints? No  Are you allergic to latex? No  Is there any chance that you may be pregnant? No    Patient has a Health Care Directive or  Living Will:  NO

## 2021-06-12 NOTE — PROGRESS NOTES
"Nutrition Assessment    Patient seen for out patient MNT inital assessment.    Referring diagnosis: Other Overweight.  Aydin also reports HTN, GERD.      Subjective:  Aydin wants to lose wt to improve his health.  His goal is to lose 30-40#.  He comes in today with a bottle of Gatorade.  He often skips breakfast but will eat if he is traveling and staying at a hotel.  Breakfast would be eggs, meat, hashbrowns.  Lunch- this may be his first meal of the day.  It is often leftovers from the day before.  One of his lunches was meat with rissoto (estimates 8 oz portion total)  Supper would be meat, veggies, pasta or starch.  On weekends he may have 1/2 frozen pizza.  Snacks-Trail mix or nuts, (handful).  Beverages- Gatorade, soda, lemonade, water.  We discuss the amount of sugar in his current beverage.  He states that he has cut back on soda intake and feels that this is an improvement.  He takes BP medicine and gets leg cramps at times.    Height: 74\"  Most recent weight: 266#    Nutrition intervention that addressed medical nutrition therapy for above diagnosis: Weight management. Discussed [id number] calorie level. Meal plan provided    RD reviewed: Physical activity and the DASH diet.    Lifestyle changes made prior to nutrition session:  Cut back on soda intake though drinks other sugar sweetened drinks such as gatorade, powerade and lemonade.    Assessment of diet/weight history: Meal intake is low on veggies and fruit.  Aydin drinks a good dose of calories from sugar.      Assessment of physical activity:  He notes that he is not doing regular physical activity but has a machine at home that he can start exercising on.    Goals: 1.  Increase veggie and fruit intake.  Aim for 5-10 servings/day as recommended in the DASH diet to improve BP.  2.  Drink more water in place of sugar sweetened beverages.  3.  Start to exercise regularly  Consider supplementing 200-400 mg magnesium/day to help with leg " cramps.    Patient had no barriers to learning, verbalized understanding, and compliance is expected.    Phone number provided for questions. Recommended follow-up in  as needed.    Thank you for this consult. Call me at [phone] for questions.

## 2021-06-12 NOTE — PROGRESS NOTES
[facility]  [address], [address]  [facility] [facility] [place] [address]  Dept: [phone]  Dept Fax: [phone]  Primary Provider: Valentin Whitley MD  Pre-op Performing Provider: VALENTIN WHITLEY    PREOPERATIVE EVALUATION:  Today's date: 10/2/2020    Aydin Aldridge is a 49 y.o. male who presents for a preoperative evaluation, undergoing right total knee arthroplasty for chronic knee pain due history of traumatic osteoarthritis.       Review of Systems  Review of Systems  Allergy: reviewed  General : negative  Ophthalmic : negative  ENT : issues with chronic tinnitus, would like a referral to the ENT  Respiratory : no cough, shortness of breath, or wheezing  Cardiovascular : no chest pain or dyspnea on exertion  Gastrointestinal : no abdominal pain, change in bowel habits, or black or bloody stools  Genito-Urinary :  no dysuria, trouble voiding, or hematuria  Dermatological : having a few skin lesion, would like a full dermatological exm by the dermatologist.    Musculoskeletal : negative  Neurological : negative  Hematological and Lymphatic : negative  Endocrine : negative        Patient Active Problem List    Diagnosis Date Noted     Thyroid nodule 10/02/2020     Right Testicular cyst      Post-traumatic osteoarthritis of right knee 07/27/2020     Post-traumatic osteoarthritis of both knees 12/23/2019     H/O colonoscopy 11/26/2019     Family history of ischemic heart disease 06/07/2019      (EGD)- GERD with Esophagitis  02/12/2019     External hemorrhoids 01/11/2019     Essential hypertension 05/08/2017     Allergic rhinitis 04/14/2010     Esophageal reflux 04/14/2010     Past Medical History:   Diagnosis Date     External hemorrhoids      Family history of myocardial infarction     Brother younger     GERD (gastroesophageal reflux disease)      History of anesthesia complications     slow to wake up, anxiety and paranoia     History of esophagogastroduodenoscopy (EGD)  2/12/2019    GERD with Esophagitis      History of transfusion     at age 12     Hypertension      Right Testicular cyst      Past Surgical History:   Procedure Laterality Date     HEMORROIDECTOMY N/A 5/23/2019    Procedure: HEMORRHOIDECTOMY;  Surgeon: Jam Mccoy MD;  Location: [facility];  Service: General     NASAL SEPTUM SURGERY       right leg surgery       THYROIDECTOMY, PARTIAL      Managed Endocrinology      UPPER GASTROINTESTINAL ENDOSCOPY       Current Outpatient Medications   Medication Sig Dispense Refill     ferrous sulfate 325 (65 FE) MG tablet Take 1 tablet by mouth daily with breakfast.       losartan (COZAAR) 50 MG tablet Take 1 tablet (50 mg total) by mouth daily. 90 tablet 3     MULTIVITAMIN ORAL Take 1 tablet by mouth daily.              omeprazole (PRILOSEC) 40 MG capsule Take 1 capsule (40 mg total) by mouth daily. 90 capsule 3     triamterene-hydrochlorothiazide (MAXZIDE) 75-50 mg per tablet Take 1 tablet by mouth daily. 90 tablet 3     fluticasone propionate (FLONASE ALLERGY RELIEF) 50 mcg/actuation nasal spray 1 spray into each nostril daily. (Patient taking differently: 1 spray into each nostril as needed. ) 16 g 12     montelukast (SINGULAIR) 10 mg tablet Take 1 tablet (10 mg total) by mouth at bedtime. 90 tablet 3     No current facility-administered medications for this visit.        Allergies   Allergen Reactions     Nickel Rash     And unpurified metals       Social History     Tobacco Use     Smoking status: Never Smoker     Smokeless tobacco: Never Used   Substance Use Topics     Alcohol use: No      Family History   Problem Relation Age of Onset     Heart disease Father      Snoring Father      Heart failure Brother      Sleep apnea Maternal Uncle      Asthma Paternal Grandmother      Social History     Substance and Sexual Activity   Drug Use Never           Objective   /80 (Patient Site: Right Arm, Patient Position: Sitting, Cuff Size: Adult Large)   Pulse 92    "Temp 97.3  F (36.3  C) (Tympanic)   Resp 22   Ht 6' 2\" (1.88 m)   Wt (!) 278 lb 4.8 oz (126.2 kg)   SpO2 97%   BMI 35.73 kg/m    Physical Exam    GENERAL APPEARANCE: healthy, alert and no distress     EYES: EOMI,  PERRL     HENT: ear canals and TM's normal and nose and mouth without ulcers or lesions     NECK: no adenopathy, no asymmetry, masses, or scars and thyroid normal to palpation     RESP: lungs clear to auscultation - no rales, rhonchi or wheezes     CV: regular rates and rhythm, normal S1 S2, no S3 or S4 and no murmur, click or rub     ABDOMEN:  soft, nontender, no HSM or masses and bowel sounds normal     MS: no apparent deformity or swelling     SKIN: no suspicious lesions or rashes     NEURO: Normal strength and tone, sensory exam grossly normal, mentation intact and speech normal     PSYCH: mentation appears normal. and affect normal/bright     LYMPHATICS: No cervical adenopathy    Recent Labs   Lab Test 10/02/20  0841 06/29/20  1215   HGB 15.3  --      --     139   K 3.7 3.5   CREATININE 1.19 1.16        PRE-OP Diagnostics:   Recent Results (from the past 168 hour(s))   Basic Metabolic Panel    Collection Time: 10/02/20  8:41 AM   Result Value Ref Range    Sodium 140 136 - 145 mmol/L    Potassium 3.7 3.5 - 5.0 mmol/L    Chloride 98 98 - 107 mmol/L    CO2 28 22 - 31 mmol/L    Anion Gap, Calculation 14 5 - 18 mmol/L    Glucose 112 70 - 125 mg/dL    Calcium 9.7 8.5 - 10.5 mg/dL    BUN 13 8 - 22 mg/dL    Creatinine 1.19 0.70 - 1.30 mg/dL    GFR MDRD Af Amer >60 >60 mL/min/1.73m2    GFR MDRD Non Af Amer >60 >60 mL/min/1.73m2   HM1 (CBC with Diff)    Collection Time: 10/02/20  8:41 AM   Result Value Ref Range    WBC 8.8 4.0 - 11.0 thou/uL    RBC 5.04 4.40 - 6.20 mill/uL    Hemoglobin 15.3 14.0 - 18.0 g/dL    Hematocrit 44.9 40.0 - 54.0 %    MCV 89 80 - 100 fL    MCH 30.4 27.0 - 34.0 pg    MCHC 34.1 32.0 - 36.0 g/dL    RDW 13.1 11.0 - 14.5 %    Platelets 315 140 - 440 thou/uL    MPV 10.2 8.5 " - 12.5 fL    Neutrophils % 69 50 - 70 %    Lymphocytes % 20 20 - 40 %    Monocytes % 7 2 - 10 %    Eosinophils % 3 0 - 6 %    Basophils % 1 0 - 2 %    Immature Granulocyte % 0 <=0 %    Neutrophils Absolute 6.1 2.0 - 7.7 thou/uL    Lymphocytes Absolute 1.8 0.8 - 4.4 thou/uL    Monocytes Absolute 0.6 0.0 - 0.9 thou/uL    Eosinophils Absolute 0.2 0.0 - 0.4 thou/uL    Basophils Absolute 0.1 0.0 - 0.2 thou/uL    Immature Granulocyte Absolute 0.0 <=0.0 thou/uL              Assessment & Plan       The proposed surgical procedure is considered LOW risk.    REVISED CARDIAC RISK INDEX   The patient has the following serious cardiovascular risks for perioperative complications:  No serious cardiac risks = 0 points    INTERPRETATION: 0 points: Class I (very low risk - 0.4% complication rate)      ICD-10-CM    1. Pre-op evaluation  Z01.818 Basic Metabolic Panel     HM1(CBC and Differential)   2. Post-traumatic osteoarthritis of right knee  M17.31    3. Essential hypertension  I10    4. Benign skin lesion of multiple sites  L98.9 Ambulatory referral to Dermatology   5. Decreased hearing of right ear  H91.91 Ambulatory referral to ENT   6. Tinnitus, right  H93.11 Ambulatory referral to ENT       The patient has the following additional risks and recommendations for perioperative complications:     - No identified additional risk factors other than previously addressed     MEDICATION INSTRUCTIONS:  Patient is to take all scheduled medications on the day of surgery    RECOMMENDATION:  APPROVAL GIVEN to proceed with proposed procedure, without further diagnostic evaluation.    Return in about 1 week (around 10/9/2020) for For post Op follow up.    Signed Electronically by: Valentin Whitley MD    Copy of this evaluation report is provided to requesting physician.    [facility]    Revised Cardiac Risk Index

## 2021-06-13 NOTE — PROGRESS NOTES
HPI: This patient is a 49yo M who presents for evaluation of hearing and tinnitus the request of Dr. Whitley. There has been a gradual loss of hearing over the past several years in both ears along with associated tinnitus bilaterally. He states it has become louder in the right ear on occasion. He also notes that when he is off his HTN medication, the tinnitus seems to improve. He thinks that the hearing in his right ear has worsened more than the left. Denies otalgia, otorrhea, vertigo, and other major medical issues. Has been around moderate noise and has no known family history of hearing loss at a young age. He also has a problematic jaw click for years. He was previously seen by a TMJ clinic many years ago and stated that it helped. Still complains of issues with dry mouth and his uvula as per his last appointment. PSG in 2017 was negative for TIMOTEO, he does snore. Has had septoplasty in the past. Comments on nasal congestion cycling (normal) and dryness in the nose. Not currently using any sprays.     Past medical history, surgical history, social history, family history, medications, and allergies have been reviewed with the patient and are documented above.    Review of Systems: a 10-system review was performed. Pertinent positives are noted in the HPI and on a separate scanned document in the chart.    PHYSICAL EXAMINATION:  GEN: no acute distress, normocephalic  EYES: extraocular movements are intact, pupils are equal and round. Sclera clear.   EARS: auricles are normally formed. The external auditory canals are clear with minimal to no cerumen. Tympanic membranes are intact bilaterally with no signs of infection, effusion, retractions, or perforations.  NOSE: anterior nares are patent.   OC/OP: clear, dentition is in good repair. The tongue and palate are fully mobile and symmetric. Uvula not swollen today. No masses or lesions.  NECK: soft and supple. No lymphadenopathy or masses. Airway is midline.  Significant left jaw pop on opening  NEURO: CN VII and XII symmetric. alert and oriented. No spontaneous nystagmus.  PULM: breathing comfortably on room air, normal chest expansion with respiration  CARDS: no cyanosis or clubbing, normal carotid pulses    AUDIOGRAM: normal hearing left. Mild CHL right between 250-100hz. Type A tymps. 100% WRS bilaterally.     MEDICAL DECISION-MAKING: This patient is a 51yo M with a few issues.  1. Mild LF CHL on the right. Discussed hearing protection. Will get a CT scan of the T-bone to further eval the CHL and have him follow-up with Dr. Harvey.  2. Bilateral tinnitus, likely multifactorial. He has a HF sensorineural scoop (still at or above normal hearing thresholds), medications, and TMJ, all of which can contribute to tinnitus. Discussed masking techniques.   3. Dry mouth/LPR. Discussed continued reflux management. Also likely impacted by medications.  4. TMJ. Will refer to TMJ clinic for further management.   5. Episodic uvular swelling secondary to snoring, not TIMOTEO. He could have a partial uvulectomy done in clinic. Explained that this is not a procedure that is covered by insurance. He will think about it.  6. Dry nose (spots of blood on kleenex): advised on nasal saline spray.

## 2021-06-13 NOTE — PROGRESS NOTES
Please call to inform the patient that their serum ferritin is abnormal and to initiate iron therapy protocol.    Iron Therapy Protocol    Please initiate the lowest dose of slow FE over-the-counter supplement that you can find any local pharmacy.  Please take 1 pill without any food or medication 30 minutes before or after intake of slow FE.  We will need to check the serum ferritin level in 3 months.

## 2021-06-13 NOTE — PROGRESS NOTES
Dear Dr. Marty Yin MD  [address] [id number],    Thank you for the opportunity to participate in the care of Aydin Aldridge.     He is a 47 y.o. y/o male patient who comes to the sleep medicine clinic for follow up.  The patient states that since starting iron therapy he has noticed a slight improvement in his quality of sleep.  Upon further questioning he admits that he has been taking the iron pills with his other medications.  I informed him that iron will bind to everything including medications food and drinks.  He should only take the iron pills stand-alone with water 30 minutes before and after.    Past Medical History:   Diagnosis Date     Family history of myocardial infarction     Brother younger     GERD (gastroesophageal reflux disease)      Hypertension        No past surgical history on file.    Social History     Social History     Marital status:      Spouse name: N/A     Number of children: N/A     Years of education: N/A     Occupational History     Not on file.     Social History Main Topics     Smoking status: Never Smoker     Smokeless tobacco: Not on file     Alcohol use No     Drug use: Not on file     Sexual activity: Not on file     Other Topics Concern     Not on file     Social History Narrative       Current Outpatient Prescriptions   Medication Sig Dispense Refill     ferrous sulfate 325 (65 FE) MG tablet Take 1 tablet by mouth daily with breakfast.       hydroCHLOROthiazide (HYDRODIURIL) 25 MG tablet Take 25 mg by mouth daily.       losartan (COZAAR) 50 MG tablet Take 1 tablet (50 mg total) by mouth daily. 30 tablet 12     omeprazole (PRILOSEC) 40 MG capsule TK 1 C PO QD  3     potassium chloride SA (K-DUR,KLOR-CON) 10 MEQ tablet   1     triamterene-hydrochlorothiazide (MAXZIDE) 75-50 mg per tablet Take 1 tablet by mouth daily. 90 tablet 3     No current facility-administered medications for this visit.        Allergies   Allergen Reactions      "Nickel Rash       Physical Exam:  /78  Pulse 88  Ht 6' 2\" (1.88 m)  Wt (!) 259 lb 9.6 oz (117.8 kg)  SpO2 97%  BMI 33.33 kg/m2  BMI:Body mass index is 33.33 kg/(m^2).   GEN: NAD, obese  Psych: normal mood, normal affect    Labs/Studies:      Lab Results   Component Value Date    WBC 7.1 07/24/2012    HGB 14.2 07/24/2012    HCT 41.2 07/24/2012    MCV 87 07/24/2012     07/24/2012         Chemistry        Component Value Date/Time     08/17/2017 1513    K 3.7 08/17/2017 1513     08/17/2017 1513    CO2 30 08/17/2017 1513    BUN 15 08/17/2017 1513    CREATININE 1.21 08/17/2017 1513    GLU 91 08/17/2017 1513        Component Value Date/Time    CALCIUM 10.4 08/17/2017 1513    ALKPHOS 73 08/17/2017 1513    AST 26 08/17/2017 1513    ALT 54 (H) 08/17/2017 1513    BILITOT 0.8 08/17/2017 1513            Lab Results   Component Value Date    FERRITIN 54 09/18/2017            Assessment and Plan:  In summary Aydin Aldridge is a 47 y.o. year old male who is here for lab follow-up.    1.  Bains Ekbom disease  We will give the patient written instructions on how to proceed with the iron therapy protocol.  I also would like the patient to check his serum ferritin level in 3 months.  He may follow up with his primary care doctor to determine if iron therapy protocol needs to continue.  2.  Hypersomnia  I recommended the patient increase his hours of sleep to match his sleep requirements on the weekends.  3.  Other sleep disturbance     Patient verbalized understanding of these issues, agrees with the plan and all questions were answered today. Patient was given an opportuntity to voice any other symptoms or concerns not listed above. Patient did not have any other symptoms or concerns.      Follow-up as needed    Raúl Edwards DO  Board Certified in Internal Medicine and Sleep Medicine  [facility].    I spent a total of 15 minutes of face-to-face encounter and more than 50% of the " encounter was used for counseling or coordination of care.    (Note created with Dragon voice recognition and unintended spelling errors and word substitutions may occur)

## 2021-06-13 NOTE — PROGRESS NOTES
Dear Dr. Marty Yin MD  [address] [phone],    Thank you for the opportunity to participate in the care of Aydin Aldridge.     He is a 46 y.o.  male patient who comes to the sleep medicine clinic for review of his sleep study. The study was completed on 07/02/17 which showed the the patient had snoring and periodic limb movement in sleep.  Upon further questioning the patient does admit that he has difficulty sitting still for more than 5 consecutive minutes.  He complains of knee pain as well as hip pain which causes him to toss and turn frequently at night.  He also would feel fidgety and antsy if forced to remain still for long periods of time.      Past Medical History:   Diagnosis Date     Family history of myocardial infarction     Brother younger     GERD (gastroesophageal reflux disease)      Hypertension        No past surgical history on file.    Social History     Social History     Marital status:      Spouse name: N/A     Number of children: N/A     Years of education: N/A     Occupational History     Not on file.     Social History Main Topics     Smoking status: Never Smoker     Smokeless tobacco: Not on file     Alcohol use No     Drug use: Not on file     Sexual activity: Not on file     Other Topics Concern     Not on file     Social History Narrative         Current Outpatient Prescriptions   Medication Sig Dispense Refill     hydroCHLOROthiazide (HYDRODIURIL) 25 MG tablet Take 25 mg by mouth daily.       losartan (COZAAR) 50 MG tablet Take 1 tablet (50 mg total) by mouth daily. 30 tablet 12     potassium chloride SA (K-DUR,KLOR-CON) 10 MEQ tablet   1     triamterene-hydrochlorothiazide (MAXZIDE) 75-50 mg per tablet Take 1 tablet by mouth daily. 90 tablet 3     omeprazole (PRILOSEC) 40 MG capsule TK 1 C PO QD  3     No current facility-administered medications for this visit.        Allergies   Allergen Reactions     Nickel Rash       Physical  "Exam:  Pulse 79  Ht 6' 2\" (1.88 m)  Wt (!) 263 lb 3.2 oz (119.4 kg)  SpO2 97%  BMI 33.79 kg/m2  BMI:Body mass index is 33.79 kg/(m^2).   GEN: NAD,   Psych: normal mood, normal affect     Labs/Studies:  - We reviewed the results of the overnight PSG as described on the HPI.     Lab Results   Component Value Date    WBC 7.1 07/24/2012    HGB 14.2 07/24/2012    HCT 41.2 07/24/2012    MCV 87 07/24/2012     07/24/2012         Chemistry        Component Value Date/Time     08/17/2017 1513    K 3.7 08/17/2017 1513     08/17/2017 1513    CO2 30 08/17/2017 1513    BUN 15 08/17/2017 1513    CREATININE 1.21 08/17/2017 1513    GLU 91 08/17/2017 1513        Component Value Date/Time    CALCIUM 10.4 08/17/2017 1513    ALKPHOS 73 08/17/2017 1513    AST 26 08/17/2017 1513    ALT 54 (H) 08/17/2017 1513    BILITOT 0.8 08/17/2017 1513            No results found for: FERRITIN        Assessment and Plan:  In summary Aydin Aldridge is a 46 y.o. year old male here for review of his sleep study.  1.  Bains Ekbom disease  I educated patient on the underlying pathophysiology of Bains Ekbom disease and will give him a handout on this topic.  I recommend that we get a serum ferritin level to see if this is a contributing factor.  2.  Hypersomnia  3.  Snoring  4.  Other sleep disturbance     Patient verbalized understanding of these issues, agrees with the plan and all questions were answered today. Patient was given an opportuntity to voice any other symptoms or concerns not listed above. Patient did not have any other symptoms or concerns.      Patient told to return in 6 weeks. Patient instructed to stop at  to schedule appointment before leaving today.    Raúl Edwards DO  Board Certified in Internal Medicine and Sleep Medicine  [facility].    We spent a total of 15 minutes of face-to-face encounter and more than 50% of the encounter was used for counseling or coordination of " care.    (Note created with Dragon voice recognition and unintended spelling errors and word substitutions may occur)

## 2021-06-13 NOTE — TELEPHONE ENCOUNTER
Spoke with  Luis Carlos regarding his CT results showing otosclerosis. This explains the CHL. No specific intervention at this time, but will continue to monitor. He has a f/u appointment with Dr. Harvey in the spring, which is appropriate. He sees the TMJ clinic soon, which will likely have the most positive effect on his day-to-day.

## 2021-06-15 NOTE — PROGRESS NOTES
Assessment/Plan:   Throat pain  Cough  URI with cough and ST x 2-3 days. RST negative.  Likely viral.      - Rapid Strep A Screen-Throat  - Group A Strep, RNA Direct Detection, Throat  - benzonatate (TESSALON PERLES) 100 MG capsule; Take 1 capsule (100 mg total) by mouth 3 (three) times a day as needed for cough.  Dispense: 30 capsule; Refill: 0  - codeine-guaiFENesin (GUAIFENESIN AC)  mg/5 mL liquid; Take 5-10 mL by mouth every 6 (six) hours as needed for cough.  Dispense: 240 mL; Refill: 0    Fluids, rest, steam, nasal saline  Lozenges or cough drops  Robitussin with codeine at night for severe cough  Tessalon perles as needed for cough every 8 hours  Tylenol and ibuprofen as needed for pain  Follow up if worse or no better   Strep confirmation test pending we will call only if positive.      Subjective:      Aydin Aldridge is a 47 y.o. male who presents with ST.  He started with twinge of ST 2-3 days ago along with cough and mild congestion.  Today throat is very painful and his glands are tender.  No fever or myalgia.  He generally feels fine otherwise.  dayquil has been helpful, takes twice a day. His wife daughter and mother have had respiratory illness the last couple weeks and been treated sinus infections or chest infections.  He has a busy week coming up and cannot afford time off if he is sick.  He wants an antibiotic to feel better. No vertigo. No N/V/D.  No wheeze or shortness of breath.  No sinus pain.  No rash.      Allergies   Allergen Reactions     Nickel Rash     Current Outpatient Prescriptions on File Prior to Visit   Medication Sig Dispense Refill     ferrous sulfate 325 (65 FE) MG tablet Take 1 tablet by mouth daily with breakfast.       losartan (COZAAR) 50 MG tablet Take 1 tablet (50 mg total) by mouth daily. 30 tablet 12     potassium chloride SA (K-DUR,KLOR-CON) 10 MEQ tablet   1     triamterene-hydrochlorothiazide (MAXZIDE) 75-50 mg per tablet Take 1 tablet by mouth daily. 90  tablet 3     hydroCHLOROthiazide (HYDRODIURIL) 25 MG tablet Take 25 mg by mouth daily.       omeprazole (PRILOSEC) 40 MG capsule TK 1 C PO QD  3     No current facility-administered medications on file prior to visit.      Patient Active Problem List   Diagnosis     Essential hypertension       Objective:     /78 (Patient Site: Left Arm, Patient Position: Sitting, Cuff Size: Adult Large)  Pulse 66  Temp 98.1  F (36.7  C) (Oral)   Resp 20  Wt (!) 260 lb (117.9 kg)  SpO2 98%  BMI 33.38 kg/m2    Physical  General Appearance: Alert, cooperative, no distress  Head: Normocephalic, without obvious abnormality, atraumatic  Eyes: Conjunctivae are normal.   Ears: Normal TMs and external ear canals, both ears  Nose: mild congestion.  Throat: Throat is red posteriorly, tonsils not enlarged.  No exudate or palatal petechiae, uvual midline.  No significant lesions  Neck: tender anterior adenopathy L>R  Lungs: Clear to auscultation bilaterally, respirations unlabored  Heart: Regular rate and rhythm  Skin: Skin color, texture, turgor normal, no rashes or lesions  Psychiatric: Patient has a normal mood and affect.        Recent Results (from the past 24 hour(s))   Rapid Strep A Screen-Throat   Result Value Ref Range    Rapid Strep A Antigen No Group A Strep detected, presumptive negative No Group A Strep detected, presumptive negative

## 2021-06-16 NOTE — TELEPHONE ENCOUNTER
Central PA team  [phone]  Pool: HE PA MED ([id number])          PA has been initiated.       PA form completed and faxed insurance via Cover My Meds     Key:  [id number]     Medication:  Omeprazole 40MG OR CPDR    Insurance:  [employer] [place]        Response will be received via fax and may take up to 5-10 business days depending on plan

## 2021-06-16 NOTE — TELEPHONE ENCOUNTER
Pt rcvd letter from [facility] - needs prior auth before 04/20/2021    Omeprazole RX    Requesting coverage exception form for the RX.     [email or web address] for the form and instructions.    Pt declined offer to schedule appt.     Last encounter with PCP = Pre Op 10/2/2020

## 2021-06-16 NOTE — TELEPHONE ENCOUNTER
Telephone Encounter by Bianca Omalley at 3/1/2019 12:01 PM     Author: Bianca Omalley Service: -- Author Type: --    Filed: 3/1/2019 12:02 PM Encounter Date: 2/25/2019 Status: Signed    : Bianca Omalley APPROVED:    Approval start date:02/25/2018  Approval end date:02/25/2019    Pharmacy has been notified of approval and will contact patient when medication is ready for pickup.

## 2021-06-17 NOTE — PROGRESS NOTES
"CHIEF COMPLAINT:   Hearing Loss        HISTORY OF PRESENT ILLNESS    Aydin Aldridge   was seen at the behest of Valentin Whitley, Tessie  for hearing loss and tinnitus in the right ear.  He has been experiencing ringing in the right ear for \"years\".   He feels like it may be related to his BP medications (if he doesn't the Maxide, it tends to lessen).   He rate the tinnitus at 5/10.  He has trouble understanding others on the right side       Spoke with Mr. Aldridge regarding his CT results showing otosclerosis. This explains the CHL. No specific intervention at this time, but will continue to monitor. He has a f/u appointment with Dr. Harvey in the spring, which is appropriate. He sees the TMJ clinic soon, which will likely have the most positive effect on his day-to-day.           REVIEW OF SYSTEMS    Review of Systems: a 10-system review is reviewed at this encounter.  See scanned document.         PHYSICAL EXAM:        HEAD: Normal appearance and symmetry:  No cutaneous lesions.      EARS:    Normal TM's bilaterally. Normal auditory canals and external ears. Non-tender.         NOSE:    Dorsum:   straight  Septum: normal  Mucosa:  moist  Inferior turbinates:  normal       ORAL CAVITY/OROPHARYNX:    Lips:  Normal.  Tongue: normal, midline  Mucosa:   no lesions  Tonsils:  1+      NECK:  Trachea:  midline.   Thyroid:  normal   Adenopathy:  none       NEURO:   Alert and Oriented       RESPIRATORY:   Symmetry and Respiratory effort    PSYCH:   normal mood and affect    SKIN:  warm and dry         IMPRESSION:    Encounter Diagnoses   Name Primary?     Conductive hearing loss, unilateral Yes     Tinnitus, right        RECOMMENDATIONS:    Recommend referral for hearing aid evaluation.    Return annually for hearing test.     Discussed the pathophysiology of otosclerosis.  Patient is currently not operative candidate.  I do think he would be an excellent candidate for hearing aid which is also help treat his tinnitus.  " He is agreeable this plan of care I would like to see him annually for repeat hearing test.

## 2021-06-17 NOTE — TELEPHONE ENCOUNTER
Telephone Encounter by Bianca Omalley at 4/21/2020  9:14 AM     Author: Bianca Omalley Service: -- Author Type: --    Filed: 4/21/2020  9:16 AM Encounter Date: 4/17/2020 Status: Signed    : Bianca Omalley APPROVED:    Approval start date: 01/20/2020  Approval end date:  047/20/2021    Pharmacy has been notified of approval and will contact patient when medication is ready for pickup.

## 2021-06-17 NOTE — TELEPHONE ENCOUNTER
Telephone Encounter by Bianca Omalley at 3/23/2021  8:40 AM     Author: Bianca Omalley Service: -- Author Type: --    Filed: 3/23/2021  8:40 AM Encounter Date: 3/18/2021 Status: Signed    : Bianca Omalley APPROVED:    Approval start date: 04/21/2021  Approval end date:  04/21/2022    Pharmacy has been notified of approval and will contact patient when medication is ready for pickup.

## 2021-06-18 NOTE — PROGRESS NOTES
"Cardiology Progress Note    Assessment:  Hypertension, good control, no evidence of hypertensive heart disease or premature coronary artery disease      Plan:  Basic metabolic panel  Continue Dyazide and losartan at current doses.  Will decide if he needs to continue potassium supplements when the results of basic metabolic panels are available.    Follow-up in 1 year    Subjective:   This is 47 y.o. male who comes in today for follow-up visit.  He has been taking Dyazide and losartan without any side effects.  He denies development of chest pain or shortness of breath.  He underwent outpatient sleep study.  He was found to have mild case of restless body syndrome.  He has been taking iron supplements.  He has had minor muscle cramps on and off.  He denies any new symptoms.    Review of Systems:   General: WNL  Eyes: WNL  Ears/Nose/Throat: WNL  Lungs: WNL  Heart: WNL  Stomach: WNL  Bladder: WNL  Muscle/Joints: WNL  Skin: WNL  Nervous System: WNL  Mental Health: WNL     Blood: WNL    Objective:   /78  Pulse 69  Resp 12  Ht 6' 2\" (1.88 m)  Wt (!) 258 lb (117 kg)  BMI 33.13 kg/m2  Physical Exam:  GENERAL: no distress  NECK: No JVD  LUNGS: Clear to auscultation.  CARDIAC: regular rhythm, S1 & S2 normal.  No heaves, thrills, gallops or murmurs.  ABDOMEN: flat, negative hepatosplenomegaly, soft and non-tender.  EXTREMITIES: No evidence of cyanosis, clubbing or edema.    Current Outpatient Prescriptions   Medication Sig Dispense Refill     famotidine (PEPCID) 40 MG tablet TK 1 T PO QD HS  3     ferrous sulfate 325 (65 FE) MG tablet Take 1 tablet by mouth daily with breakfast.       losartan (COZAAR) 50 MG tablet Take 1 tablet (50 mg total) by mouth daily. 30 tablet 12     omeprazole (PRILOSEC) 40 MG capsule TK 1 C PO QD  3     potassium chloride SA (K-DUR,KLOR-CON) 10 MEQ tablet   1     triamterene-hydrochlorothiazide (MAXZIDE) 75-50 mg per tablet Take 1 tablet by mouth daily. 30 tablet 12     benzonatate " (TESSALON PERLES) 100 MG capsule Take 1 capsule (100 mg total) by mouth 3 (three) times a day as needed for cough. 30 capsule 0     No current facility-administered medications for this visit.        Cardiographics:    Echo: May 2017  1. Normal left ventricular size and systolic performance with a visually estimated ejection fraction of 55-60%.   2. No significant valvular heart disease is identified on this study.   3. Normal right ventricular size and systolic performance.     Stress test: May 2017    Exercise stress EKG is negative for inducible myocardial ischemia.    Moderate diastolic hypertension was noted before, during, and after exercise.    The patient's exercise capacity is mildly impaired.  Lab Results:       Lab Results   Component Value Date    CHOL 178 12/09/2016    CHOL 171 03/06/2015     Lab Results   Component Value Date    HDL 41 12/09/2016    HDL 37 (L) 03/06/2015     Lab Results   Component Value Date    LDLCALC 115 12/09/2016    LDLCALC 104 03/06/2015     Lab Results   Component Value Date    TRIG 110 12/09/2016    TRIG 148 03/06/2015     No results found for: BNP    Mayco Wood MD (Ted)

## 2021-06-18 NOTE — LETTER
Letter by Valentin Whitley MD at      Author: Valentin Whitley MD Service: -- Author Type: --    Filed:  Encounter Date: 1/14/2019 Status: (Other)       Aydin Aldridge  [address]  [facility] [id number]             January 14, 2019         Dear Mr. Aldridge,    Below are the results from your recent visit:    Resulted Orders   Basic Metabolic Panel   Result Value Ref Range    Sodium 140 136 - 145 mmol/L    Potassium 3.7 3.5 - 5.0 mmol/L    Chloride 98 98 - 107 mmol/L    CO2 31 22 - 31 mmol/L    Anion Gap, Calculation 11 5 - 18 mmol/L    Glucose 102 70 - 125 mg/dL    Calcium 10.6 (H) 8.5 - 10.5 mg/dL    BUN 15 8 - 22 mg/dL    Creatinine 1.29 0.70 - 1.30 mg/dL    GFR MDRD Af Amer >60 >60 mL/min/1.73m2    GFR MDRD Non Af Amer 60 (L) >60 mL/min/1.73m2    Narrative    Fasting Glucose reference range is 70-99 mg/dL per  American Diabetes Association (ADA) guidelines.       Normal       Please call with questions or contact us using [email or web address].    Sincerely,        Electronically signed by Valentin Whitley MD

## 2021-06-20 NOTE — LETTER
Letter by Valentin Whitley MD at      Author: Valentin Whitley MD Service: -- Author Type: --    Filed:  Encounter Date: 7/2/2020 Status: (Other)         Aydin Aldridge  [address]  [facility] [address]             July 2, 2020         Dear Mr. Aldridge,    Below are the results from your recent visit:    Resulted Orders   Basic Metabolic Panel   Result Value Ref Range    Sodium 139 136 - 145 mmol/L    Potassium 3.5 3.5 - 5.0 mmol/L    Chloride 99 98 - 107 mmol/L    CO2 29 22 - 31 mmol/L    Anion Gap, Calculation 11 5 - 18 mmol/L    Glucose 108 70 - 125 mg/dL    Calcium 9.7 8.5 - 10.5 mg/dL    BUN 14 8 - 22 mg/dL    Creatinine 1.16 0.70 - 1.30 mg/dL    GFR MDRD Af Amer >60 >60 mL/min/1.73m2    GFR MDRD Non Af Amer >60 >60 mL/min/1.73m2    Narrative    Fasting Glucose reference range is 70-99 mg/dL per  American Diabetes Association (ADA) guidelines.   Lipid Profile   Result Value Ref Range    Triglycerides 172 (H) <=149 mg/dL    Cholesterol 204 (H) <=199 mg/dL    LDL Calculated 134 (H) <=129 mg/dL    HDL Cholesterol 36 (L) >=40 mg/dL    Patient Fasting > 8hrs? Unknown    Thyroid Stimulating Hormone (TSH)   Result Value Ref Range    TSH 1.12 0.30 - 5.00 uIU/mL                                                  Normal thyroid and metabolic panel                            Please call with questions or contact us using [email or web address].    Sincerely,        Electronically signed by Valentin Whitley MD

## 2021-06-20 NOTE — PROGRESS NOTES
Subjective:   Aydin Aldridge is a(n) 47 y.o. White or  male who presents to [facility] with the following complaint(s):  Cough (x3-4 days, achy/sore all over, chest congestion)    History of Present Illness:  Developed a dry tickle in his throat 3 days ago. Developed a cough 2 days ago. Has some sinus pressure and chest congestion. Cough is non-productive. Feels mildly short of breath. No history of asthma. Was prescribed an inhaler once in the past. No fevers. Was chilled a couple of days ago. Feels generally sore and achy. Only ill contact is his mother-in-law. Non-smoker.     The following portions of the patient's history were reviewed and updated as appropriate: allergies, current medications, past family history, past medical history, past social history, past surgical history and problem list.    Review of Systems:   Review of Systems   Constitutional: Positive for chills and fatigue. Negative for appetite change and fever.   HENT: Positive for congestion (mild), postnasal drip and sore throat (mild). Negative for ear pain and rhinorrhea.    Eyes: Positive for discharge (AM's). Negative for redness and itching.   Respiratory: Positive for cough and shortness of breath. Negative for wheezing and stridor.    Cardiovascular: Negative for chest pain and palpitations.   Gastrointestinal: Negative for abdominal pain, diarrhea, nausea and vomiting.   Musculoskeletal: Positive for arthralgias and myalgias.   Skin: Negative for pallor and rash.   Allergic/Immunologic: Negative for environmental allergies.   Neurological: Positive for headaches.   Hematological: Negative for adenopathy.     Objective:     Vitals:    09/06/18 1012   BP: 136/88   Patient Site: Right Arm   Patient Position: Sitting   Cuff Size: Adult Regular   Pulse: 100   Resp: 16   Temp: 99  F (37.2  C)   TempSrc: Oral   SpO2: 98%   Weight: (!) 272 lb 11.2 oz (123.7 kg)     Physical Exam   Constitutional: He is oriented to person, place,  and time. He appears well-developed.   Obese.     HENT:   Head: Normocephalic and atraumatic.   Right Ear: External ear and ear canal normal. Tympanic membrane is injected.   Left Ear: External ear and ear canal normal. Tympanic membrane is erythematous and bulging.   Nose: Mucosal edema and rhinorrhea present. Right sinus exhibits no maxillary sinus tenderness and no frontal sinus tenderness. Left sinus exhibits no maxillary sinus tenderness and no frontal sinus tenderness.   Mouth/Throat: Uvula is midline and mucous membranes are normal. No oropharyngeal exudate or posterior oropharyngeal erythema.   Eyes: Conjunctivae and lids are normal.   Neck: Neck supple.   Cardiovascular: Normal rate, regular rhythm, S1 normal and S2 normal.  Exam reveals no gallop and no friction rub.    No murmur heard.  Pulmonary/Chest: Effort normal and breath sounds normal. No respiratory distress. He has no decreased breath sounds. He has no wheezes. He has no rhonchi. He has no rales.   Lymphadenopathy:     He has no cervical adenopathy.   Neurological: He is alert and oriented to person, place, and time.   Skin: Skin is warm and dry. No rash noted. No pallor.   Nursing note and vitals reviewed.      Assessment/Plan   1. Acute suppurative otitis media of left ear without spontaneous rupture of tympanic membrane, recurrence not specified  - azithromycin (ZITHROMAX Z-MAMTA) 250 MG tablet; Take 2 tablets (500 mg) on  Day 1,  followed by 1 tablet (250 mg) once daily on Days 2 through 5.  Dispense: 6 tablet; Refill: 0    2. Cough  - benzonatate (TESSALON) 200 MG capsule; Take 1 capsule (200 mg total) by mouth 3 (three) times a day as needed for cough.  Dispense: 21 capsule; Refill: 0    - Treating with azithromycin rather than amoxicillin as patient reports better tolerance of azithromycin.   - Counseled patient regarding assessment and plan for evaluation and treatment. Questions were answered. See AVS for the specific written instructions  and educational handout(s) regarding otitis media that were provided at the conclusion of the visit.   - Discussed signs / symptoms that warrant urgent / emergent medical attention.   - Follow up as needed.     José Pablo MD

## 2021-06-20 NOTE — LETTER
Letter by Valentin Whitley MD at      Author: Valentin Whitley MD Service: -- Author Type: --    Filed:  Encounter Date: 7/1/2020 Status: (Other)         Aydin Aldridge  [address]  [facility] [address]             July 1, 2020         Dear Mr. Aldridge,    Below are the results from your recent visit:    Resulted Orders   Basic Metabolic Panel   Result Value Ref Range    Sodium 139 136 - 145 mmol/L    Potassium 3.5 3.5 - 5.0 mmol/L    Chloride 99 98 - 107 mmol/L    CO2 29 22 - 31 mmol/L    Anion Gap, Calculation 11 5 - 18 mmol/L    Glucose 108 70 - 125 mg/dL    Calcium 9.7 8.5 - 10.5 mg/dL    BUN 14 8 - 22 mg/dL    Creatinine 1.16 0.70 - 1.30 mg/dL    GFR MDRD Af Amer >60 >60 mL/min/1.73m2    GFR MDRD Non Af Amer >60 >60 mL/min/1.73m2    Narrative    Fasting Glucose reference range is 70-99 mg/dL per  American Diabetes Association (ADA) guidelines.   Lipid Profile   Result Value Ref Range    Triglycerides 172 (H) <=149 mg/dL    Cholesterol 204 (H) <=199 mg/dL    LDL Calculated 134 (H) <=129 mg/dL    HDL Cholesterol 36 (L) >=40 mg/dL    Patient Fasting > 8hrs? Unknown    Thyroid Stimulating Hormone (TSH)   Result Value Ref Range    TSH 1.12 0.30 - 5.00 uIU/mL        Elevated cholesterol    Make an attempt to improve diet, cut back on the carbs and fats,  and exercise more efficiently to aid in  medical management of this problem.      Recheck in a year.     Please call with questions or contact us using [employer].    Sincerely,        Electronically signed by Valentin Whitley MD

## 2021-06-20 NOTE — LETTER
Letter by Valentin Whitley MD at      Author: Valentin Whitley MD Service: -- Author Type: --    Filed:  Encounter Date: 10/6/2020 Status: (Other)         [facility]  [address] [facility] [address]             October 6, 2020         Dear Mr. Aldridge,    Below are the results from your recent visit:    Resulted Orders   Basic Metabolic Panel   Result Value Ref Range    Sodium 140 136 - 145 mmol/L    Potassium 3.7 3.5 - 5.0 mmol/L    Chloride 98 98 - 107 mmol/L    CO2 28 22 - 31 mmol/L    Anion Gap, Calculation 14 5 - 18 mmol/L    Glucose 112 70 - 125 mg/dL    Calcium 9.7 8.5 - 10.5 mg/dL    BUN 13 8 - 22 mg/dL    Creatinine 1.19 0.70 - 1.30 mg/dL    GFR MDRD Af Amer >60 >60 mL/min/1.73m2    GFR MDRD Non Af Amer >60 >60 mL/min/1.73m2    Narrative    Fasting Glucose reference range is 70-99 mg/dL per  American Diabetes Association (ADA) guidelines.   HM1 (CBC with Diff)   Result Value Ref Range    WBC 8.8 4.0 - 11.0 thou/uL    RBC 5.04 4.40 - 6.20 mill/uL    Hemoglobin 15.3 14.0 - 18.0 g/dL    Hematocrit 44.9 40.0 - 54.0 %    MCV 89 80 - 100 fL    MCH 30.4 27.0 - 34.0 pg    MCHC 34.1 32.0 - 36.0 g/dL    RDW 13.1 11.0 - 14.5 %    Platelets 315 140 - 440 thou/uL    MPV 10.2 8.5 - 12.5 fL    Neutrophils % 69 50 - 70 %    Lymphocytes % 20 20 - 40 %    Monocytes % 7 2 - 10 %    Eosinophils % 3 0 - 6 %    Basophils % 1 0 - 2 %    Immature Granulocyte % 0 <=0 %    Neutrophils Absolute 6.1 2.0 - 7.7 thou/uL    Lymphocytes Absolute 1.8 0.8 - 4.4 thou/uL    Monocytes Absolute 0.6 0.0 - 0.9 thou/uL    Eosinophils Absolute 0.2 0.0 - 0.4 thou/uL    Basophils Absolute 0.1 0.0 - 0.2 thou/uL    Immature Granulocyte Absolute 0.0 <=0.0 thou/uL       Normal    Please call with questions or contact us using [email or web address].    Sincerely,        Electronically signed by Valentin Whitley MD

## 2021-06-20 NOTE — LETTER
Letter by Latonia Solis NP at      Author: Latonia Solis NP Service: -- Author Type: --    Filed:  Encounter Date: 9/24/2020 Status: (Other)         Aydin Aldridge  [address]  [place] [address]             September 24, 2020         Dear Mr. Aldridge,    Below are the results from your recent visit:    Resulted Orders   US Thyroid    Narrative    EXAM: US THYROID  LOCATION: [facility]  DATE/TIME: 9/24/2020 8:58 AM    INDICATION: Nontoxic single thyroid nodule  COMPARISON: Thyroid ultrasound exams 06/27/2018, 6/20/2017 and 6/1/2016  TECHNIQUE: Thyroid ultrasound.     FINDINGS:  RIGHT lobe: 4.7 x 2.2 x 2.4 cm. Heterogeneous with possible small nodules.  Isthmus: Surgically absent.  LEFT lobe: Surgically absent.    NECK: No cervical lymphadenopathy.    NODULES:    Nodule 1: Anterior right mid thyroid lobe nodule measuring 1.1 x 0.7 x 0.8 cm, previously 0.4 x 0.7 cm   Composition: Solid or almost completely solid, 2 points   Echogenicity: Hypoechoic, 2 points   Shape: Wider-than-tall, 0 points   Margin: Smooth, 0 points   Echogenic Foci: None, or large comet-tail artifacts, 0 points   Point Total: 4-6 points. TI-RADS 4. If 1.5 cm or larger, recommend FNA; if 1 cm or larger, follow up US (annually for 5 years).      Nodule 2: Posterior lower right thyroid lobe nodule measuring 0.7 x 0.6 x 0.8 cm, previously 0.9 x 0.7 x 1.1 cm.  Composition: Solid or almost completely solid, 2 points   Echogenicity: Hypoechoic, 2 points   Shape: Wider-than-tall, 0 points   Margin: Smooth, 0 points   Echogenic Foci: None, or large comet-tail artifacts, 0 points   Point Total: 4-6 points. TI-RADS 4. If 1.5 cm or larger, recommend FNA; if 1 cm or larger, follow up US (annually for 5 years).        Impression    1.  Lower right thyroid lobe 0.8 cm TI RADS 4 nodule, slightly decreased in size since the exam from 2018 and essentially stable since 2016. No FNA is indicated. This can be followed with ultrasound in  an additional 2 years.  2.  Mild interval increase in size of a 1.1 cm right mid thyroid lobe TI RADS 4 nodule. Recommend attention on 2 year follow-up.  3.  Post left thyroidectomy.            Nodules are characterized per  ACR Thyroid Imaging, Reporting and Data System (TI-RADS): White Paper of the ACR TI-RADS Committee  Goyo Gibson et al. Journal of the American College of Radiology 2017. Volume 14 (2017), Issue 5, 187-408.        Looks like the nodule is stable and smaller than the last time it was looked at. The lobe itself is a little larger, but not anything concerning. They recommend another US in 2 years.     Please call with questions or contact us using [employer].    Sincerely,        Electronically signed by Latonia Solis NP

## 2021-06-23 NOTE — PATIENT INSTRUCTIONS - HE
1. Chronic GERD  - Ambulatory Referral for Upper GI Endoscopy    2. Cough  - predniSONE (DELTASONE) 20 MG tablet; Take 40 mg by mouth daily for 7 days.  Dispense: 14 tablet; Refill: 0  Continue with Albuterol every 4 hours.   Monitor symptoms and follow up as needed.     3. Essential hypertension  - Basic Metabolic Panel; Future  Restart all Bp meds.     4. External hemorrhoids  - Ambulatory referral to Colorectal Surgery

## 2021-06-23 NOTE — TELEPHONE ENCOUNTER
Sosa    Review and advise on scheduling. If okay for 1st available or if should be DB.    Auth Provider: VALENTIN WHITLEY Enc Provider: Valentin Whitley MD   Diagnosis: H/O partial thyroidectomy

## 2021-06-23 NOTE — PROGRESS NOTES
Assessment/Plan:      Aydin Aldridge is a 48 y.o. male here to establish care      1. Chronic GERD  - Ambulatory Referral for Upper GI Endoscopy    2. Cough  Exam findings were discussed and recommended trial of  - predniSONE (DELTASONE) 20 MG tablet; Take 40 mg by mouth daily for 7 days.  Dispense: 14 tablet; Refill: 0    Refill:  - benzonatate (TESSALON PERLES) 100 MG capsule; Take 1 capsule (100 mg total) by mouth 3 (three) times a day as needed for cough.  Dispense: 30 capsule; Refill: 0    3. Essential hypertension  Mildly elevated blood pressure today  Continue with the current triamterene/hydrochlorothiazide  Restart losartan  Recheck blood pressure in a month    - Basic Metabolic Panel; Future  - Basic Metabolic Panel    4. External hemorrhoids  Exam findings were discussed  Patient is use the over-the-counter treatment with a significant improvement considering surgical options  Plan:  - Ambulatory referral to Colorectal Surgery    5. H/O partial thyroidectomy  - Ambulatory referral to Endocrinology     45  minutes spent on this visit with more than 50% time spent on coordination of care with staff, reviewing medical record, med reconciliation,          Subjective:    Patient ID:   Aydin Aldridge is a 48 y.o. male with a history of nasal septum surgery, presenting with having nasal/ sinus congestion since last September, and empirically treating with Flonase for PND.  He's also been having a cough for which was treated with a course of Zpack and tried some albuterol.  He states that the sx are much better but not completely gone.  He has no shortness of breath. The cough has been intermittent, thinking that Losartan might be the contributing factor, stopped taking, and now wondering if he should restart taking.     He has no  History of Asthma or emphysema.      2- history of Chronic GERD, and would like a referral for EGD.       3- history of  External hemorrhoids  Has tried various modalities.   Still having protruding and uncomfortable hemorrhoids and would like to purse the surgical option.       4- H/O partial thyroidectomy  Needs a referral to follow-up with endocrinology        Review of Systems  A complete 10 point review of systems was obtained and is negative other than what is stated in the HPI.       The following patient's history were reviewed and updated as appropriate:   He  has a past medical history of Family history of myocardial infarction, GERD (gastroesophageal reflux disease), Hypertension, and Right Testicular cyst.  He  has a past surgical history that includes Thyroidectomy, partial; right leg surgery; and Nasal septum surgery.  His family history includes Heart disease in his father; Heart failure in his brother; Sleep apnea in his maternal uncle; Snoring in his father.  He  reports that  has never smoked. he has never used smokeless tobacco. He reports that he does not drink alcohol. His drug history is not on file..      Outpatient Encounter Medications as of 1/11/2019   Medication Sig Dispense Refill     albuterol (PROAIR HFA;PROVENTIL HFA;VENTOLIN HFA) 90 mcg/actuation inhaler Inhale 2 puffs every 6 (six) hours as needed for wheezing. Use with spacer. 1 each 0     benzonatate (TESSALON PERLES) 100 MG capsule Take 1 capsule (100 mg total) by mouth 3 (three) times a day as needed for cough. 30 capsule 0     famotidine (PEPCID) 40 MG tablet TK 1 T PO QD HS  3     ferrous sulfate 325 (65 FE) MG tablet Take 1 tablet by mouth daily with breakfast.       fluticasone (FLONASE ALLERGY RELIEF) 50 mcg/actuation nasal spray 1 spray into each nostril daily. 16 g 0     MULTIVITAMIN ORAL Take by mouth.       triamterene-hydrochlorothiazide (MAXZIDE) 75-50 mg per tablet Take 1 tablet by mouth daily. 30 tablet 12     loratadine-pseudoephedrine (CLARITIN-D 24 HOUR)  mg per 24 hr tablet Take 1 tablet by mouth daily. 14 tablet 0     losartan (COZAAR) 50 MG tablet Take 1 tablet (50 mg total)  by mouth daily. 30 tablet 12     omeprazole (PRILOSEC) 40 MG capsule TK 1 C PO QD  3     [DISCONTINUED] azithromycin (ZITHROMAX Z-MAMTA) 250 MG tablet Take 2 tablets (500 mg) on  Day 1,  followed by 1 tablet (250 mg) once daily on Days 2 through 5. 6 tablet 0     [DISCONTINUED] losartan (COZAAR) 50 MG tablet TAKE 1 TABLET BY MOUTH DAILY 90 tablet 2     [DISCONTINUED] potassium chloride SA (K-DUR,KLOR-CON) 10 MEQ tablet   1     [DISCONTINUED] triamterene-hydrochlorothiazide (MAXZIDE) 75-50 mg per tablet TAKE 1 TABLET BY MOUTH EVERY DAY 90 tablet 2     No facility-administered encounter medications on file as of 1/11/2019.          Objective:   /88 (Patient Site: Right Arm, Patient Position: Sitting, Cuff Size: Adult Large)   Pulse 77   Temp 98.1  F (36.7  C) (Oral)   Wt (!) 270 lb 8 oz (122.7 kg)   SpO2 98%   BMI 34.73 kg/m        Physical Exam  General Appearance:    Alert, cooperative, no distress, appears stated age   Neck:   Supple, symmetrical, trachea midline, no adenopathy;        thyroid:  No enlargement/tenderness/nodules; no carotid    bruit or JVD   Back:     No palpable tenderness   Lungs:    Scattered expiratory wheezing , bilaterally, respirations unlabored   Chest wall:    No tenderness or deformity   Heart:    Regular rate and rhythm, S1 and S2 normal, no murmur, rub    or gallop   Abdomen:  Nontender nondistended, normoactive bowel sounds no rebound or guarding   Rectal:  External hemorrhoids no fissures   Skin:   Skin color, texture, turgor normal, no rashes or lesions

## 2021-06-24 NOTE — TELEPHONE ENCOUNTER
pls PA for GERD with esophagitis.  This would be a chronic daily treatment with omeprazole 40 mg  New script sent

## 2021-06-24 NOTE — PROGRESS NOTES
Assessment/Plan:        1. Chronic cough  Discussed primary versus secondary causes  Patient has chronic GERD with esophagitis confirmed on EGD  Prior treatments with inhalers and oral prednisone has helped to some degree but not completely    Plan:  - Ambulatory referral to Pulmonology    2.  (EGD)- GERD with Esophagitis   Continue current medical treatment with daily omeprazole.  - omeprazole (PRILOSEC) 40 MG capsule; Take 1 capsule (40 mg total) by mouth daily.  Dispense: 90 capsule; Refill: 3              Subjective:    Patient ID:   Aydin Aldridge is a 48 y.o. male comes in for follow up and a med check / review.         1. Chronic GERD  With esophagitis, confirmed on the EGD   to continue on omeprazole 40 mg daily and needs a refill     2. Cough  Still having a mild cough, and it's now more like a chocking   The past treatments have seemed to help to a next step, but not completely making it improve.  He is wondering if there is anything else that should be looked at.       3. Essential hypertension    Continuing on  triamterene/hydrochlorothiazide and losartan  Doing well no refills at this time needed          Review of Systems  General : negative  ENT : negative  Respiratory : See HPI  Cardiovascular : no chest pain or dyspnea on exertion  Gastrointestinal : See HPI  Genito-Urinary :  no dysuria, trouble voiding, or hematuria  Musculoskeletal : negative  Dermatological : negative    Allergy: reviewed      The following patient's history were reviewed and updated as appropriate:   He  has a past medical history of Family history of myocardial infarction, GERD (gastroesophageal reflux disease), History of esophagogastroduodenoscopy (EGD) (2/12/2019), Hypertension, and Right Testicular cyst..      Outpatient Encounter Medications as of 2/21/2019   Medication Sig Dispense Refill     famotidine (PEPCID) 40 MG tablet TK 1 T PO QD HS  3     ferrous sulfate 325 (65 FE) MG tablet Take 1 tablet by mouth daily with  breakfast.       losartan (COZAAR) 50 MG tablet Take 1 tablet (50 mg total) by mouth daily. 30 tablet 12     MULTIVITAMIN ORAL Take by mouth.       triamterene-hydrochlorothiazide (MAXZIDE) 75-50 mg per tablet Take 1 tablet by mouth daily. 30 tablet 12     albuterol (PROAIR HFA;PROVENTIL HFA;VENTOLIN HFA) 90 mcg/actuation inhaler Inhale 2 puffs every 6 (six) hours as needed for wheezing. Use with spacer. 1 each 0     benzonatate (TESSALON PERLES) 100 MG capsule Take 1 capsule (100 mg total) by mouth 3 (three) times a day as needed for cough. 30 capsule 0     fluticasone (FLONASE ALLERGY RELIEF) 50 mcg/actuation nasal spray 1 spray into each nostril daily. 16 g 0     loratadine-pseudoephedrine (CLARITIN-D 24 HOUR)  mg per 24 hr tablet Take 1 tablet by mouth daily. 14 tablet 0     omeprazole (PRILOSEC) 40 MG capsule TK 1 C PO QD  3     No facility-administered encounter medications on file as of 2/21/2019.          Objective:   /74 (Patient Site: Right Arm, Patient Position: Sitting, Cuff Size: Adult Large)   Pulse 90   Wt (!) 275 lb 4.8 oz (124.9 kg)   SpO2 97%   BMI 35.35 kg/m        Physical Exam  General Appearance:    Alert, well hydrated, no distress   Throat:   mucous membranes moist, pharynx normal without lesions   Neck:   Supple, symmetrical, trachea midline, no adenopathy;     thyroid:  no enlargement/tenderness/nodules;    Lungs:     clear to auscultation, no wheezes, rales or rhonchi, symmetric air entry     Heart:    Regular rate and rhythm, S1 and S2 normal, no murmur, rub   or gallop, no edema    Skin:   Skin color, texture, turgor normal, no rashes or lesions

## 2021-06-24 NOTE — TELEPHONE ENCOUNTER
[facility] PA team  [phone]  Pool: HE PA MED ([id number])          PA has been initiated.       PA form completed and faxed insurance via Cover My Meds     Key:  [id number]     Medication:  Omeprazole 40MG OR CPDR    Insurance:  [employer] [place]        Response will be received via fax and may take up to 5-10 business days depending on plan

## 2021-06-24 NOTE — TELEPHONE ENCOUNTER
Amy contacted PCP regarding Rx for Omeprazole.  Pt has reached limit for Rx, and pharmacy/insurance is unable to refill still.  Pt has completed imaging test and thought he would be able to receive refill year round.  Please contact ins or Amy to authorize year-round refill of Omeprazole.

## 2021-06-25 NOTE — TELEPHONE ENCOUNTER
Refill Approved    Rx renewed per Medication Renewal Policy. Medication was last renewed on 2/16/19.    Ivette Mccall, [facility] Triage/Med Refill 3/21/2019     Requested Prescriptions   Pending Prescriptions Disp Refills     albuterol (PROAIR HFA;PROVENTIL HFA;VENTOLIN HFA) 90 mcg/actuation inhaler [Pharmacy Med Name: ALBUTEROL HFA INH (200 PUFFS) 18GM] 18 g 0     Sig: INHALE 2 PUFFS BY MOUTH EVERY 6 HOURS AS NEEDED FOR WHEEZING. USE WITH SPACER    Albuterol/Levalbuterol Refill Protocol Passed - 3/19/2019  3:52 AM       Passed - PCP or prescribing provider visit in last year    Last office visit with prescriber/PCP: Visit date not found OR same dept: 2/21/2019 Valentin Whitley MD OR same specialty: 2/21/2019 Valentin Whitley MD Last physical: Visit date not found       Next appt within 3 mo: Visit date not found  Next physical within 3 mo: Visit date not found  Prescriber OR PCP: Tanvi Wagner PA-C  Last diagnosis associated with med order: 1. Cough  - albuterol (PROAIR HFA;PROVENTIL HFA;VENTOLIN HFA) 90 mcg/actuation inhaler [Pharmacy Med Name: ALBUTEROL HFA INH (200 PUFFS) 18GM]; INHALE 2 PUFFS BY MOUTH EVERY 6 HOURS AS NEEDED FOR WHEEZING. USE WITH SPACER  Dispense: 18 g; Refill: 0    If protocol passes may refill for 6 months if within 3 months of last provider visit (or a total of 9 months). If patient requesting >1 inhaler per month refill x 6 months and have patient make appointment with provider.

## 2021-06-25 NOTE — PROGRESS NOTES
Progress Notes by Mayco Wood MD (Ted) at 5/8/2017  9:10 AM     Author: Mayco Wood MD (Ted) Service: -- Author Type: Physician    Filed: 5/8/2017 10:06 AM Encounter Date: 5/8/2017 Status: Signed    : Mayco Wood MD (Ted) (Physician)           Click to link to [facility] HEART CARE NOTE    Thank you, Dr. Yin, for asking the [facility] to evaluate Mr. Aydin Aldridge.      Assessment/Recommendations   Assessment:    Hypertension, primary, inadequate control  Obesity  Family history of premature coronary artery disease    Plan:  He should be on combination of antihypertensive agents.  He will continue Dyazide. I will start him on ARB such as losartan 50 mg a day.  I suspect that we do not need to look for secondary causes of hypertension at this point.  I reinforced importance of reduced caloric intake, reduce salt intake and regular exercise.  He should avoid excessive use of caffeinated drinks.  We should consider sleep studies to rule out sleep apnea as a contributor to uncontrolled hypertension.  Will get echocardiogram and stress tests.       History of Present Illness    Mr. Aydin Aldridge is a 46 y.o. male who comes in today for cardiac evaluation.  His main concern is difficult to control hypertension.  He states that he has had elevated blood pressure since he was in his 20s.  He has been on different antihypertensive medications on and off for last 20 years.  Most recently he was taking diastolic and verapamil.  He thinks that he was taking something that was giving him cough presumably lisinopril. The highest systolic blood pressure that he ever remembers is about 170 and diastolic about 110.  He was recently put on water pill again.  Blood pressure is lower than it was prior to initiation of diuretic.  He does not have a history of known heart disease.  He denies valvular heart disease, cardiomyopathy,  coronary artery disease.  He has not had exertional chest pains.  He has had few episodes of nonexertional pain in the past.  He does not recall an extensive cardiac testing in the recent years.  He has been gaining weight lately.  He does report loud snoring.  Several years ago he was in the process of going through sleep study.  He did not complete that evaluation due to insurance coverage lapse.    ECG: Personally reviewed.        Physical Examination Review of Systems   Vitals:    05/08/17 0940   BP: 145/90   Pulse:    Resp:      Body mass index is 34.67 kg/(m^2).  Wt Readings from Last 3 Encounters:   05/08/17 (!) 270 lb (122.5 kg)     General Appearance:   Alert, cooperative, no distress, appears stated age   Head/ENT: Normocephalic, without obvious abnormality. Membranes moist      EYES:  no scleral icterus, normal conjunctivae   Neck: Supple, symmetrical, trachea midline, no adenopathy, thyroid: not enlarged, symmetric, no carotid bruit or JVD   Chest/Lungs:   Lungs are clear to auscultation, respirations unlabored. No tenderness or deformity    Cardiovascular:   Regular rhythm, S1, S2 normal, no murmur, rub or gallop.   Abdomen:  Soft, non-tender, bowel sounds active all four quadrants,  no masses, no organomegaly   Extremities: no cyanosis or clubbing. No edema   Skin: Skin color, texture, turgor normal, no rashes or lesions.    Psychiatric: Normal affect, calm   Neurologic: Alert and oriented x 3, moving all four extremities.     General: Night Sweats, Weight Gain  Eyes: Visual Distubance  Ears/Nose/Throat: Hearing Loss, Nosebleeds  Lungs: Cough, Snoring  Heart: Chest Pain, Shortness of Breath with activity, Irregular Heartbeat, Leg Swelling  Stomach: Heartburn  Bladder: WNL  Muscle/Joints: Joint Pain  Skin: WNL  Nervous System: Daytime Sleepiness  Mental Health: Anxiety     Blood: WNL     Medical History  Surgical History Family History Social History   Hypertension  Leg fracture  his younger brother had  "\"heart attack\"  Social History     Social History   ? Marital status:      Spouse name: N/A   ? Number of children: N/A   ? 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 Smoking status: Never Smoker   ? Smokeless tobacco: Not on file   ? Alcohol use Not on file   ? Drug use: Not on file   ? Sexual activity: Not on file     Other Topics Concern   ? Not on file     Social History Narrative   ? No narrative on file          Medications  Allergies   Current Outpatient Prescriptions   Medication Sig Dispense Refill   ? omeprazole (PRILOSEC) 40 MG capsule TK 1 C PO QD  3   ? potassium chloride SA (K-DUR,KLOR-CON) 10 MEQ tablet   1   ? triamterene-hydrochlorothiazide (MAXZIDE) 75-50 mg per tablet Take by mouth.       No current facility-administered medications for this visit.       Allergies   Allergen Reactions   ? Nickel Rash         Lab Results    Chemistry/lipid CBC Cardiac Enzymes/BNP/TSH/INR   Lab Results   Component Value Date    CHOL 178 12/09/2016    HDL 41 12/09/2016    LDLCALC 115 12/09/2016    TRIG 110 12/09/2016    CREATININE 1.24 04/21/2017    BUN 11 04/21/2017    K 3.9 04/21/2017     04/21/2017     04/21/2017    CO2 28 04/21/2017    Lab Results   Component Value Date    WBC 7.1 07/24/2012    HGB 14.2 07/24/2012    HCT 41.2 07/24/2012    MCV 87 07/24/2012     07/24/2012    Lab Results   Component Value Date    CKMB <1 07/24/2012    TROPONINI <0.01 07/25/2012    TSH 2.25 04/21/2017    INR 1.04 01/29/2014                      "

## 2021-06-25 NOTE — PROGRESS NOTES
Pulmonary Clinic Consult Note  Date of Service: 3/15/2019    Reason for Consultation: Chronic cough    History:     HPI: Patient is a 48 y.o. male with a history of hypertension and GERD with esophagitis was referred for chronic cough.  Approximately 6 months ago, patient had a cold with symptoms of runny nose and throat discomfort who was diagnosed with a viral URI developed a cough which has been persistent since. Over the last 6 months, he has received 3 rounds of antibiotics and one round of steroids (finished approximately 1 month ago) with some mild improvement. Was prescribed a albuterol inhaler approximately 2 months ago and use it for couple weeks without significant relief.  Has not been on any controller medication.  Coughing is productive of clear sputum and improves with hot or cold p.o. intake.  Nothing seems to make it worse.  Noted that he started omeprazole approximately 2 weeks ago for acid reflux with esophagitis with some improvement in his coughing.  Does note over the last several days he developed rhinorrhea with throat discomfort likely due to a cold.  Over the last 2 months, noted more difficulty with swallowing and feeling like he was choking every time he tries to eat something.  Denied any recent hospitalization or emergency visits for his breathing issues.  No history of personal asthma but paternal grandfather did have asthma.  Never smoked and works as a     PMHx/PSHx:  Past Medical History:   Diagnosis Date     Family history of myocardial infarction     Brother younger     GERD (gastroesophageal reflux disease)      History of esophagogastroduodenoscopy (EGD) 2/12/2019    GERD with Esophagitis      Hypertension      Right Testicular cyst      Past Surgical History:   Procedure Laterality Date     NASAL SEPTUM SURGERY       right leg surgery       THYROIDECTOMY, PARTIAL      Managed Endocrinology      Social Hx:  Social History     Socioeconomic History     Marital status:     Tobacco Use     Smoking status: Never Smoker     Smokeless tobacco: Never Used     Review of Systems - 10 point review of system negative except for what is mentioned in the HPI.    Meds and Allergies:  See EHR for the updated medication list and Allergies. These were reviewed.     Family Hx:     Family History   Problem Relation Age of Onset     Heart disease Father      Snoring Father      Heart failure Brother      Sleep apnea Maternal Uncle      Asthma Paternal Grandmother          Exam/Data:   /78   Pulse 78   Resp 16   Wt (!) 274 lb (124.3 kg)   SpO2 98% Comment: RA  BMI 35.18 kg/m      EXAM:  GEN: comfortable, NAD  HEENT: NCAT, EMOI, mmm  LN: no cervical LAD   CVS: S1S2, RRR  Lung: good air entry bilaterally, no wheezing  Abd: soft, nt, + BS. No masses  Ext: no c/c/e  Vasc: intact radial pulses bilaterally  Neuro: nonfocal  Skin: no visible rash  Musculoskeletal: FROM all extremities  Psych: normal affect    DATA  PFT DATA  FEV1/FVC is 87 and is normal.  FEV1 is 96% predicted and is normal.  FVC is 86% predicted and is normal.  There was no improvement in spirometry after a single inhaled dose of bronchodilator.  TLC is 90% predicted and is normal.  RV is 84% predicted and is normal.  DLCO is 114% predicted and is normal when it is not corrected for hemoglobin.  The flow volume loop is normal yes     Impression:  Full Pulmonary Function Test is normal.    IMAGING: personally reviewed images: None    Assessment/Plan:   Aydin Aldridge is a 48 y.o. male with a history of GERD with esophagitis and hypertension who presented with chronic cough for over 6 months.  Likely related to uncontrolled acid reflux given improvement with omeprazole recently and no significant improvement with inhalers.  PFT indicates no obstructive or restrictive lung disease.  Flow volume loop looks normal.  Given the patient is improving with PPI, I strongly suspect that this is related to his esophagitis.  Okay to  hold inhalers since he did not find much improvement.    Recommendations:  -Continue taking omeprazole 40 mg daily before breakfast  -Will obtain CXR to evaluate for bronchiectasis   -If not improving with omeprazole therapy, consider further evaluation with methacholine challenge test.    FOLLOW UP: 2 months    Ramiro Lanier MD  [facility]  P:[phone]    Faculty Supervision of Residents     I have personally examined and assessed this patient. I have reviewed and discussed plan of care with the resident. The documentation outlined by the resident reflects my direct assessment and plan of care.     Vaibhav Mclain MD  3/15/2019        Allergies   Allergen Reactions     Nickel Rash     Medications:     Current Outpatient Medications   Medication Sig Dispense Refill     albuterol (PROAIR HFA;PROVENTIL HFA;VENTOLIN HFA) 90 mcg/actuation inhaler Inhale 2 puffs every 6 (six) hours as needed for wheezing. Use with spacer. 1 each 0     ferrous sulfate 325 (65 FE) MG tablet Take 1 tablet by mouth daily with breakfast.       losartan (COZAAR) 50 MG tablet Take 1 tablet (50 mg total) by mouth daily. 30 tablet 12     MULTIVITAMIN ORAL Take by mouth.       omeprazole (PRILOSEC) 40 MG capsule Take 1 capsule (40 mg total) by mouth daily. 90 capsule 3     triamterene-hydrochlorothiazide (MAXZIDE) 75-50 mg per tablet Take 1 tablet by mouth daily. 30 tablet 12     benzonatate (TESSALON PERLES) 100 MG capsule Take 1 capsule (100 mg total) by mouth 3 (three) times a day as needed for cough. 30 capsule 0     No current facility-administered medications for this visit.      Much or all of the text in this note was generated through the use of the Dragon Dictate voice-to-text software. Errors in spelling or words which seem out of context are unintentional. Sound alike errors, in particular, may have escaped editing.

## 2021-06-26 NOTE — PROGRESS NOTES
AUDIOLOGY REPORT    SUBJECTIVE: Aydin Aldridge, 50 y.o.  year old male, was seen on 06/09/21 for a hearing aid evaluation.  His hearing was assessed on 5/18/21 and results revealed a mild conductive hearing loss rising to normal hearing in the right ear and normal hearing in the left ear. Patient's history is significant for otosclerosis, but is not a surgical candidate at this time. Medical clearance was provided by Dr. Harvey.     OBJECTIVE: Manufacturers, styles, monaural vs binaural fittings, trial period, technology levels, and realistic expectations were discussed. Aydin would like a hearing aid that is rechargeable and could connect to his android phone. At this time, Aydin is unsure if he would like to pursue a hearing aid or not. We decided to pick a hearing aid out today and when he comes to a decision he would like the clinic know and we can move forward with ordering and fitting the device.     Hearing Aids  Ears Fit: Right  :Sviral   Model: Biomedix vascular solutioneo M70-R  Style:-in-the-ear  Color: P6/Silver  Dome:Medium vented  :2S  Ma Model: [id number]-H0    We briefly discussed various causes of tinnitus and its relationship to hearing loss. We did discuss options to try at home including masking-type noises (white noise, non-white noise, and speech options) as well as a sound pillow at night. Patient currently utilizes ear plugs while sleeping and he given a demo of Sviral's sleep earplugs as a courtesy to try.     ASSESSMENT: Reviewed purchase information and warranty information with patient. The 45 day trial period was explained to patient. The patient was given a copy of the [place] Department of [employer] consumer brochure on purchasing hearing instruments. Patient risk factors have been provided to the patient in writing prior to the sale of the hearing aid per FDA regulation. The risk factors are also available in the User Instructional Booklet to be presented on the day of the  hearing aid fitting. Hearing aid(s) were not ordered at this time, as patient would still like to think about his options. Hearing aid evaluation completed.      PLAN: Aydin will contact the clinic if he would like to move forward with getting a hearing aid. At this time, the hearing aid chosen as noted above will be ordered and patient will return for a hearing aid fitting. Please contact our clinic at [phone] with questions or concerns.    Sabrina Holland, [facility]  Clinical Audiologist  [place] #[id number]

## 2021-06-26 NOTE — PROGRESS NOTES
"Progress Notes by Mago Polk CNP at 11/20/2018 12:10 PM     Author: Mago Polk CNP Service: -- Author Type: Nurse Practitioner    Filed: 11/20/2018  1:49 PM Encounter Date: 11/20/2018 Status: Signed    : Mago Polk CNP (Nurse Practitioner)       Chief Complaint   Patient presents with   ? Sinus Problem   ? Ear Pain   ? Cough       ASSESSMENT & PLAN:   There are no diagnoses linked to this encounter.    MDM:  Given length of symptoms, chronic postnasal drip for months, throat irritation, and retracted TMs, suspect that patient does have allergies.  Patient instructed to try Claritin-D for 3 days and then start Augmentin if his symptoms are the same or worse after 3 days.  Patient does have a history of hypertension, but has not taken his blood pressure medication today.  Instructed to stop other Sudafed which he has not had for 2 days.  Could also have a viral URI on top of previous symptoms.      Hopefully with pseudoephedrine and allergy treatment, his vertigo-like symptoms will muna.       See discharge instructions below for specific recommendations given.    At the end of the encounter, I discussed results, diagnosis, medications. Discussed red flags for immediate return to clinic/ER, as well as indications for follow up if no improvement. Patient and/or caregiver understood and agreed to plan. Patient was stable for discharge.    SUBJECTIVE    HPI:  Has had ongoing chest congestion, cough, sinus pressure, ear pressure.  +Dizzy, nausea/vertigo last 2 days.      Tried generic Sudafed last week.  Didn't do much.  Not sure if was behind counter or shelf Sudafed since wife picked it up.      Has been taking Dayquil/Nyquil.      Rec'd \"z-pack\" for left AOM in September.  Didn't do much for cough/congestion/ears.     Hx surgery for deviated septum.      Reports long-standing history of hayfever, but thinks that he fever went away 3 years ago.                History obtained from the " "patient.    Past Medical History:   Diagnosis Date   ? Family history of myocardial infarction     Brother younger   ? GERD (gastroesophageal reflux disease)    ? Hypertension        Problem List:  2017-05: Essential hypertension      Social History     Tobacco Use   ? Smoking status: Never Smoker   ? Smokeless tobacco: Never Used   Substance Use Topics   ? Alcohol use: No       Review of Systems   Constitutional: Positive for fatigue. Negative for chills and fever.   HENT: Positive for congestion and postnasal drip. Negative for ear pain (ear plugging), rhinorrhea and sore throat (feels dry in the back of his throat, irritated ).    Respiratory: Positive for cough (Worse at night.).    Gastrointestinal: Positive for nausea (while driving ). Negative for diarrhea and vomiting.   Allergic/Immunologic: Positive for environmental allergies (\"i used to have seasonal allergies\"). Negative for food allergies.   Neurological: Positive for dizziness (x 2 days; better than earlier this morning. ).       OBJECTIVE    Vitals:    11/20/18 1241 11/20/18 1244   BP: 142/80 140/84   Patient Site: Right Arm Right Arm   Patient Position: Sitting Sitting   Cuff Size: Adult Large Adult Large   Pulse: 99    Resp: 16    Temp: 98.6  F (37  C)    TempSrc: Oral    SpO2: 98%    Weight: (!) 278 lb (126.1 kg)        Physical Exam   Constitutional: He is oriented to person, place, and time. He appears well-developed and well-nourished. No distress.   HENT:   Right Ear: Tympanic membrane is retracted. Tympanic membrane is not bulging.   Left Ear: Tympanic membrane is retracted. Tympanic membrane is not bulging.   Nose: Right sinus exhibits frontal sinus tenderness. Right sinus exhibits no maxillary sinus tenderness. Left sinus exhibits no maxillary sinus tenderness and no frontal sinus tenderness.   Mouth/Throat: Posterior oropharyngeal erythema present. No oropharyngeal exudate or posterior oropharyngeal edema.   Eyes: Conjunctivae are normal. " Right eye exhibits no discharge. Left eye exhibits no discharge.   Cardiovascular: Intact distal pulses.   Pulmonary/Chest: Effort normal and breath sounds normal. No respiratory distress.   Musculoskeletal: Normal range of motion.   Neurological: He is alert and oriented to person, place, and time.   Skin: Skin is warm and dry. Capillary refill takes less than 2 seconds.   Psychiatric: He has a normal mood and affect. His behavior is normal. Judgment and thought content normal.       Labs:  No results found for this or any previous visit (from the past 240 hour(s)).      Radiology:    No results found.    PATIENT INSTRUCTIONS:   Patient Instructions   Please try the Claritin-D for three days.  If your symptoms are still present after 3 days, start taking the Augmentin.      If you start the Augmentin and are no better after 3-4 days, see Dr. Yin.        Patient Education     Sinusitis (No Antibiotics)    The sinuses are air-filled spaces within the bones of the face. They connect to the inside of the nose. Sinusitis is an inflammation of the tissue that lines the sinuses. Sinusitis can occur during a cold. It can also happen due to allergies to pollens and other particles in the air. It can cause symptoms such as sinus congestion, headache, sore throat, facial swelling, and a feeling of fullness. It may also cause a low-grade fever. Your sinusitis does not include an infection with bacteria. Because of this, antibiotics are not used to treat this problem.  Home care    Drink plenty of water, hot tea, and other liquids. This may help thin nasal mucus. It also may help your sinuses drain fluids.    Heat may help soothe painful areas of your face. Use a towel soaked in hot water. Or,  the shower and direct the warm spray onto your face. Using a vaporizer along with a menthol rub at night may also help soothe symptoms.     An expectorant with guaifenesin may help thin nasal mucus and help your sinuses drain  fluids.    You can use an over-the-counter decongestant, unless a similar medicine was prescribed to you. Nasal sprays work the fastest. Use one that contains phenylephrine or oxymetazoline. First blow your nose gently. Then use the spray. Do not use these medicines more often than directed on the label. If you do, your symptoms may get worse. You may also take pills that contain pseudoephedrine. Dont use products that combine multiple medicines. This is because side effects may be increased. Read all medicine labels. You can also ask the pharmacist for help. (People with high blood pressure should not use decongestants. They can raise blood pressure.)    Over-the-counter antihistamines may help if allergies contributed to your sinusitis.      Use acetaminophen or ibuprofen to control pain, unless another pain medicine was prescribed to you. If you have chronic liver or kidney disease or ever had a stomach ulcer, talk with your healthcare provider before using these medicines. (Aspirin should never be taken by anyone under age 18 who is ill with a fever. It may cause severe liver damage.)    Use nasal rinses or irrigation as instructed by your healthcare provider.    Don't smoke. This can make symptoms worse.  Follow-up care  Follow up with your healthcare provider or our staff if you are better in 1 week.  When to seek medical advice  Call your healthcare provider if any of these occur:    Green or yellow fluid draining from your nose or into your throat    Facial pain or headache that gets worse    Stiff neck    Unusual drowsiness or confusion    Swelling of your forehead or eyelids    Vision problems, such as blurred or double vision    Fever of 100.4 F (38 C) or higher, or as directed by your healthcare provider    Seizure    Breathing problems    Symptoms that don't go away in 10 days  Date Last Reviewed: 11/1/2017 2000-2017 [employer]. [address] [place] [address], [place] [address]. All rights  reserved. This information is not intended as a substitute for professional medical care. Always follow your healthcare professional's instructions.           Patient Education     Earache, No Infection (Adult)  Earaches can happen without an infection. This occurs when air and fluid build up behind the eardrum causing a feeling of fullness and discomfort and reduced hearing. This is called otitis media with effusion (OME) or serous otitis media. It means there is fluid in the middle ear. It is not the same as acute otitis media, which is typically from infection.  OME can happen when you have a cold if congestion blocks the passage that drains the middle ear. This passage is called the eustachian tube. OME may also occur with nasal allergies or after a bacterial middle ear infection.    The pain or discomfort may come and go. You may hear clicking or popping sounds when you chew or swallow. You may feel that your balance is off. Or you may hear ringing in the ear.  It often takes from several weeks up to 3 months for the fluid to clear on its own. Oral pain relievers and ear drops help if there is pain. Decongestants and antihistamines sometimes help. Antibiotics don't help since there is no infection. Your doctor may prescribe a nasal spray to help reduce swelling in the nose and eustachian tube. This can allow the ear to drain.  If your OME doesn't improve after 3 months, surgery may be used to drain the fluid and insert a small tube in the eardrum to allow continued drainage.  Because the middle ear fluid can become infected, it is important to watch for signs of an ear infection which may develop later. These signs include increased ear pain, fever, or drainage from the ear.  Home care  The following guidelines will help you care for yourself at home:    You may use over-the-counter medicine as directed to control pain, unless another medicine was prescribed. If you have chronic liver or kidney disease or ever  had a stomach ulcer or GI bleeding, talk with your doctor before using these medicines. Aspirin should never be used in anyone under 18 years of age who is ill with a fever. It may cause severe liver damage.    You may use over-the-counter decongestants such as phenylephrine or pseudoephedrine. But they are not always helpful. Don't use nasal spray decongestants more than 3 days. Longer use can make congestion worse. Prescription nasal sprays from your doctor don't typically have those restrictions.    Antihistamines may help if you are also having allergy symptoms.    You may use medicines such as guaifenesin to thin mucus and promote drainage.  Follow-up care  Follow up with your healthcare provider or as advised if you are not feeling better after 3 days.  When to seek medical advice  Call your healthcare provider right away if any of the following occur:    Your ear pain gets worse or does not start to improve     Fever of 100.4 F (38 C) or higher, or as directed by your healthcare provider    Fluid or blood draining from the ear    Headache or sinus pain    Stiff neck    Unusual drowsiness or confusion  Date Last Reviewed: 10/1/2016    6567-4331 [employer]. [address] [place] [address], [place] [address]. All rights reserved. This information is not intended as a substitute for professional medical care. Always follow your healthcare professional's instructions.

## 2021-06-27 NOTE — PROGRESS NOTES
Progress Notes by Jovani Cooney PA-C at 12/27/2018  1:20 PM     Author: Jovani Cooney PA-C Service: -- Author Type: Physician Assistant    Filed: 12/27/2018  4:10 PM Encounter Date: 12/27/2018 Status: Signed    : Jovani Cooney PA-C (Physician Assistant)       Subjective:      Patient ID: Aydin Aldridge is a 48 y.o. male.    Chief Complaint:    HPI  Aydin Aldridge is a 48 y.o. male who presents today complaining of a 2-month history of dry nonproductive cough.  Patient is now complaining that he has had quite a bit of drainage down the posterior pharyngeal wall and has had frontal maxillary sinus pain pressure and congestion.  She has not had chills night sweats vomiting diarrhea skin rash abdominal pain or urinary symptoms.  He is a non-smoker.  He drives for [employer].  He has not tried over-the-counter cough preparations at this time.        Past Medical History:   Diagnosis Date   ? Family history of myocardial infarction     Brother younger   ? GERD (gastroesophageal reflux disease)    ? Hypertension        No past surgical history on file.    Family History   Problem Relation Age of Onset   ? Heart disease Father    ? Snoring Father    ? Heart failure Brother    ? Sleep apnea Maternal Uncle        Social History     Tobacco Use   ? Smoking status: Never Smoker   ? Smokeless tobacco: Never Used   Substance Use Topics   ? Alcohol use: No   ? Drug use: Not on file       Review of Systems  As above in HPI, otherwise balance of Review of Systems are negative.    Objective:     /88 (Patient Site: Right Arm, Patient Position: Sitting, Cuff Size: Adult Large)   Pulse 99   Temp 98.4  F (36.9  C) (Oral)   Resp 20   Wt (!) 274 lb (124.3 kg)   SpO2 96%   BMI 35.18 kg/m      Physical Exam  General: Patient is resting comfortably no acute distress is afebrile  HEENT: Head is normocephalic atraumatic   eyes are PERRL EOMI sclera anicteric   TMs are clear bilaterally  Throat is with mild pharyngeal wall  erythema and mild posterior pharyngeal wall exudate  No cervical lymphadenopathy present  LUNGS: Clear to auscultation bilaterally  HEART: Regular rate and rhythm  Skin: Without rash non-diaphoretic    Lab:  Recent Results (from the past 24 hour(s))   Rapid Strep A Screen-Throat   Result Value Ref Range    Rapid Strep A Antigen No Group A Strep detected, presumptive negative No Group A Strep detected, presumptive negative       Assessment:     Procedures    1. Acute pharyngitis, unspecified etiology  Rapid Strep A Screen-Throat    Group A Strep, RNA Direct Detection, Throat    fluticasone (FLONASE ALLERGY RELIEF) 50 mcg/actuation nasal spray   2. Cough  albuterol (PROAIR HFA;PROVENTIL HFA;VENTOLIN HFA) 90 mcg/actuation inhaler    benzonatate (TESSALON PERLES) 100 MG capsule    azithromycin (ZITHROMAX Z-MAMTA) 250 MG tablet       Plan:     1. Acute pharyngitis, unspecified etiology  Rapid Strep A Screen-Throat    Group A Strep, RNA Direct Detection, Throat    fluticasone (FLONASE ALLERGY RELIEF) 50 mcg/actuation nasal spray   2. Cough  albuterol (PROAIR HFA;PROVENTIL HFA;VENTOLIN HFA) 90 mcg/actuation inhaler    benzonatate (TESSALON PERLES) 100 MG capsule    azithromycin (ZITHROMAX Z-MAMTA) 250 MG tablet       Patient has had a long-standing history of cough for 2 months.  He is now developed postnasal drainage and concern for congestion.  He will be treated empirically with Flonase for postnasal drainage and albuterol for cough and Zithromax Z-Mamta.  Indication for return was gone over and questions were answered before discharge.    Patient Instructions   You were seen today for acute bronchitis. This is likely due to a viral illness.    Symptom management:  - Get plenty of rest  - Avoid smoking and second hand smoke  - May take tylenol or ibuprofen for fever/discomfort  - Drink plenty of non-caffeinated fluids  - Use nasal steroid spray for sinus congestion  - Albuterol inhaler may be used every 6 hours as needed for  chest tightness      Reasons to be seen in the emergency room:  - Develop a fever of 100.4 or higher  - Cough changes, coughing up blood, or become short of breath  - Neck stiffness  - Chest pain  - Severe headache  - Unable to tolerate eating or drinking fluids    Otherwise, if no symptom improvement after 5 days, follow-up with your primary care provider.      Patient Education     Bronchitis, Antibiotic Treatment (Adult)    Bronchitis is an infection of the air passages (bronchial tubes) in your lungs. It often occurs when you have a cold. This illness is contagious during the first few days and is spread through the air by coughing and sneezing, or by direct contact (touching the sick person and then touching your own eyes, nose, or mouth).  Symptoms of bronchitis include cough with mucus (phlegm) and low-grade fever. Bronchitis usually lasts 7 to 14 days. Mild cases can be treated with simple home remedies. More severe infection is treated with an antibiotic.  Home care  Follow these guidelines when caring for yourself at home:    If your symptoms are severe, rest at home for the first 2 to 3 days. When you go back to your usual activities, don't let yourself get too tired.    Do not smoke. Also avoid being exposed to secondhand smoke.    You may use over-the-counter medicines to control fever or pain, unless another medicine was prescribed. If you have chronic liver or kidney disease or have ever had a stomach ulcer or gastrointestinal bleeding, talk with your healthcare provider before using these medicines. Also talk to your provider if you are taking medicine to prevent blood clots. Aspirin should never be given to anyone younger than 18 years of age who is ill with a viral infection or fever. It may cause severe liver or brain damage.    Your appetite may be poor, so a light diet is fine. Avoid dehydration by drinking 6 to 8 glasses of fluids per day (such as water, soft drinks, sports drinks, juices, tea,  or soup). Extra fluids will help loosen secretions in the nose and lungs.    Over-the-counter cough, cold, and sore-throat medicines will not shorten the length of the illness, but they may be helpful to reduce symptoms. (Note: Do not use decongestants if you have high blood pressure.)    Finish all antibiotic medicine. Do this even if you are feeling better after only a few days.  Follow-up care  Follow up with your healthcare provider, or as advised. If you had an X-ray or ECG (electrocardiogram), a specialist will review it. You will be notified of any new findings that may affect your care.  If you are age 65 or older, or if you have a chronic lung disease or condition that affects your immune system, or you smoke, ask your healthcare provider about getting a pneumococcal vaccine and a yearly flu shot (influenza vaccine).  When to seek medical advice  Call your healthcare provider right away if any of these occur:    Fever of 100.4 F (38 C) or higher, or as directed by your healthcare provider    Coughing up increased amounts of colored sputum    Weakness, drowsiness, headache, facial pain, ear pain, or a stiff neck  Call 911  Call 911 if any of these occur.    Coughing up blood    Worsening weakness, drowsiness, headache, or stiff neck    Trouble breathing, wheezing, or pain with breathing  Date Last Reviewed: 9/13/2015 2000-2017 [employer]. [address] [place] [address]. All rights reserved. This information is not intended as a substitute for professional medical care. Always follow your healthcare professional's instructions.

## 2021-06-29 NOTE — PROGRESS NOTES
"Progress Notes by Mayco Wood MD (Ted) at 9/30/2020 12:50 PM     Author: Mayco Wood MD (Ted) Service: -- Author Type: Physician    Filed: 9/30/2020  2:23 PM Encounter Date: 9/30/2020 Status: Signed    : Mayco Wood MD (Ted) (Physician)           The patient has been notified of following:     \"This video visit will be conducted via a call between you and your physician/provider. We have found that certain health care needs can be provided without the need for an in-person physical exam.  This service lets us provide the care you need with a video conversation.  If a prescription is necessary we can send it directly to your pharmacy.  If lab work is needed we can place an order for that and you can then stop by our lab to have the test done at a later time.      Patient has given verbal consent to a Video visit? Yes    HEART CARE VIDEO ENCOUNTER        The patient has chosen to have the visit conducted as a video visit, to reduce risk of exposure given the current status of Coronavirus in our community. This video visit is being conducted via a call between the patient and physician/provider. Health care needs are being provided without a physical exam.     Assessment/Recommendations   Assessment/Plan:  Hypertension, primary, good control  Family history of premature coronary artery disease  Tinnitus doubtfully related to any of antihypertensive medications  Erectile dysfunction      I reassured him that his antihypertensive medication are unlikely reason for tinnitus and erectile dysfunction.  As long as his blood pressure is properly controlled I would not change his medication    I have reviewed the results of last years coronary calcium scan.  His score was 0 which renders excellent 10-year prognosis    Follow Up Plan: 1 to 2 years  I have reviewed the note as documented.  This accurately captures the substance of my conversation with the patient.    Total " time of video between patient and provider was 12 minutes   Start Time: 1 PM  Stop Time: 1:12 PM    Originating Location (pt. Location): Home    Distant Location (provider location):  [facility] HEART [facility]      Mode of Communication:  Video Conference via [email or web address]       History of Present Illness/Subjective    Aydin Aldridge is a 49 y.o. male who is being evaluated via a billable video visit and has consented to a video visit.  He reports no new cardiac symptoms.  He has not had chest pain or shortness of breath.  He is compliant with all cardiac medications.  He states his blood pressure stays under good control.  He thinks that urinating and erectile dysfunction could be related to blood pressure medication.  Last year he underwent coronary calcium scan.  He score was 0.      I have reviewed and updated the patient's Past Medical History, Social History, Family History and Medication List.     Physical Examination performed via live video encounter Review of Systems   General Appearance:   no distress, normal body habitus, upright.   ENT/Mouth: membranes moist, no nasal discharge or bleeding gums.  Normal head shape, no evidence of injury or laceration.     EYES:  no scleral icterus, normal conjunctivae   Neck: no evidence of thyromegaly.  Supple   Chest/Lungs:   No audible wheezing equal chest wall expansion. Non labored breathing.  No cough.   Cardiovascular:   No evidence of elevated jugular venous pressure.  No evidence of pitting edema bilaterally    Abdomen:  no evidence of abdominal distention. No observe juandice.     Extremities: no cyanosis or clubbing noted.    Skin: no xanthelasma, normal skin color. No evidence of facial lacerations.      Neurologic: Normal arm motion bilateral, no tremors.  No evidence of focal defect.       Psychiatric: alert and oriented x3, calm                                               Medical History  Surgical History Family History Social History   Past Medical History:    Diagnosis Date   ? External hemorrhoids    ? Family history of myocardial infarction     Brother younger   ? GERD (gastroesophageal reflux disease)    ? History of anesthesia complications     slow to wake up, anxiety and paranoia   ? History of esophagogastroduodenoscopy (EGD) 2/12/2019    GERD with Esophagitis    ? History of transfusion     at age 12   ? Hypertension    ? Right Testicular cyst     Past Surgical History:   Procedure Laterality Date   ? HEMORROIDECTOMY N/A 5/23/2019    Procedure: HEMORRHOIDECTOMY;  Surgeon: Jam Mccoy MD;  Location: [facility];  Service: General   ? NASAL SEPTUM SURGERY     ? right leg surgery     ? THYROIDECTOMY, PARTIAL      Managed Endocrinology    ? UPPER GASTROINTESTINAL ENDOSCOPY      Family History   Problem Relation Age of Onset   ? Heart disease Father    ? Snoring Father    ? Heart failure Brother    ? Sleep apnea Maternal Uncle    ? Asthma Paternal Grandmother       Social History     Socioeconomic History   ? Marital status:      Spouse name: Not on file   ? Number of children: Not on file   ? Years of education: Not on file   ? Highest education level: Not on file   Occupational History   ? Not on file   Social Needs   ? Financial resource strain: Not on file   ? Food insecurity     Worry: Not on file     Inability: Not on file   ? Transportation needs     Medical: Not on file     Non-medical: Not on file   Tobacco Use   ? Smoking status: Never Smoker   ? Smokeless tobacco: Never Used   Substance and Sexual Activity   ? Alcohol use: No   ? Drug use: Never   ? Sexual activity: Not on file   Lifestyle   ? Physical activity     Days per week: Not on file     Minutes per session: Not on file   ? Stress: Not on file   Relationships   ? Social connections     Talks on phone: Not on file     Gets together: Not on file     Attends Jain service: Not on file     Active member of club or organization: Not on file     Attends meetings of clubs or  organizations: Not on file     Relationship status: Not on file   ? Intimate partner violence     Fear of current or ex partner: Not on file     Emotionally abused: Not on file     Physically abused: Not on file     Forced sexual activity: Not on file   Other Topics Concern   ? Not on file   Social History Narrative   ? Not on file          Medications  Allergies   Current Outpatient Medications   Medication Sig Dispense Refill   ? ferrous sulfate 325 (65 FE) MG tablet Take 1 tablet by mouth daily with breakfast.     ? losartan (COZAAR) 50 MG tablet Take 1 tablet (50 mg total) by mouth daily. 90 tablet 3   ? MULTIVITAMIN ORAL Take 1 tablet by mouth daily.            ? omeprazole (PRILOSEC) 40 MG capsule Take 1 capsule (40 mg total) by mouth daily. 90 capsule 3   ? triamterene-hydrochlorothiazide (MAXZIDE) 75-50 mg per tablet Take 1 tablet by mouth daily. 90 tablet 3   ? fluticasone propionate (FLONASE ALLERGY RELIEF) 50 mcg/actuation nasal spray 1 spray into each nostril daily. (Patient taking differently: 1 spray into each nostril as needed. ) 16 g 12   ? montelukast (SINGULAIR) 10 mg tablet Take 1 tablet (10 mg total) by mouth at bedtime. 90 tablet 3     No current facility-administered medications for this visit.     Allergies   Allergen Reactions   ? Nickel Rash     And unpurified metals         Lab Results    Chemistry/lipid CBC Cardiac Enzymes/BNP/TSH/INR   Lab Results   Component Value Date    CHOL 204 (H) 06/29/2020    HDL 36 (L) 06/29/2020    LDLCALC 134 (H) 06/29/2020    TRIG 172 (H) 06/29/2020    CREATININE 1.16 06/29/2020    BUN 14 06/29/2020    K 3.5 06/29/2020     06/29/2020    CL 99 06/29/2020    CO2 29 06/29/2020    Lab Results   Component Value Date    WBC 7.1 07/24/2012    HGB 14.2 07/24/2012    HCT 41.2 07/24/2012    MCV 87 07/24/2012     07/24/2012    Lab Results   Component Value Date    CKMB <1 07/24/2012    TROPONINI <0.01 07/25/2012    TSH 1.12 06/29/2020    INR 1.04 01/29/2014         Mayco Wood (Ted)

## 2021-06-30 NOTE — PROGRESS NOTES
"Progress Notes by Georgia Recio AuD at 11/18/2020 10:40 AM     Author: Georgia Recio AuD Service: -- Author Type: Audiologist    Filed: 11/18/2020 12:09 PM Encounter Date: 11/18/2020 Status: Signed    : Georgia Recio AuD (Audiologist)       Audiology Report    Summary: Audiology visit completed. Please see audiogram below or in \"media\" tab for case history and results.     Plan:  The patient is returned to ENT for follow up. Return for retesting with ENT recommendation.     Sabrina Pelayo, [facility]  Clinical Audiologist  [place] #[id number]           "

## 2021-06-30 NOTE — PROGRESS NOTES
"Progress Notes by Dunia Mary AuD at 5/18/2021  9:20 AM     Author: Dunia Mary AuD Service: -- Author Type: Audiologist    Filed: 5/18/2021  1:25 PM Encounter Date: 5/18/2021 Status: Signed    : Dunia Mary AuD (Audiologist)       Audiology Report:    Referring Provider: Dr. Harvey    Summary: Audiology visit completed. Please see audiogram below or in \"media\" tab for case history and results.      Transducer: Circumaural headphones    Reliability was good  and there was good  SRT to PTA agreement. Results show stable hearing in both ears compared to results from 11/18/20.    PLAN: Aydin is returned to ENT for follow up. He should return as required for medical management.     Sabrina Christopher, [facility]  [place] Licensed Audiologist #[id number]                "

## 2021-07-03 NOTE — ADDENDUM NOTE
Addendum Note by Natalia Gaviria LPN at 6/25/2020 11:33 AM     Author: Natalia Gaviria LPN Service: -- Author Type: Certified Medical Assistant    Filed: 6/25/2020 11:33 AM Encounter Date: 6/24/2020 Status: Signed    : Natalia Gaviria LPN (Licensed Nurse)    Addended by: NATALIA GAVIRIA on: 6/25/2020 11:33 AM        Modules accepted: Orders

## 2021-07-07 ENCOUNTER — COMMUNICATION - HEALTHEAST (OUTPATIENT)
Dept: INTERNAL MEDICINE | Facility: CLINIC | Age: 51
End: 2021-07-07

## 2021-07-07 DIAGNOSIS — Z98.890 HISTORY OF ESOPHAGOGASTRODUODENOSCOPY (EGD): ICD-10-CM

## 2021-07-07 NOTE — TELEPHONE ENCOUNTER
Telephone Encounter by Bianca Omalley at 7/7/2021  1:37 PM     Author: Bianca Omalley Service: -- Author Type: --    Filed: 7/7/2021  1:41 PM Encounter Date: 7/7/2021 Status: Signed    : Bianca Omalley       See encounter dated 03/18/2021. Patient has an approval on file for medication. Pharmacy can not fill medication until refills are authorized.

## 2021-07-07 NOTE — TELEPHONE ENCOUNTER
Telephone Encounter by Ivette Naranjo at 7/7/2021 10:55 AM     Author: Ivette Naranjo Service: -- Author Type: --    Filed: 7/7/2021 11:03 AM Encounter Date: 7/7/2021 Status: Signed    : Ivette Naranjo       Pt states Prior Auth is needed     Wants to make sure that this is done because he has jumped through all the hoops to prove that this medication is medically necessary.    Pt is requesting RX with refills sent to [facility]    Refill Request  Did you contact pharmacy: Yes - [facility] informed patient today they have not heard back from clinic.  Medication name:   Omepraozle    Who prescribed the medication: PCP  [facility]ed Pharmacy: [facility]  Is patient out of medication: No.  2 days left  Patient notified refills processed in 3 business days:  yes  Okay to leave a detailed message: yes    Pt declined offer to schedule appt.  Doesn't think it is necessary as he has done all the testing and he will need forever.    Pt last F2F with PCP = Pre Op on 10/2/2021

## 2021-07-07 NOTE — TELEPHONE ENCOUNTER
Telephone Encounter by Natalia Gaviria LPN at 7/7/2021  2:45 PM     Author: Natalia Gaviria LPN Service: -- Author Type: Licensed Nurse    Filed: 7/7/2021  2:46 PM Encounter Date: 7/7/2021 Status: Signed    : Natalia Gaviria LPN (Licensed Nurse)         Last Office Visit  10/2/2020-Romina Silva CNP     Notes:  1. Pre-op evaluation  Z01.818 Basic Metabolic Panel       HM1(CBC and Differential)   2. Post-traumatic osteoarthritis of right knee  M17.31     3. Essential hypertension  I10     4. Benign skin lesion of multiple sites  L98.9 Ambulatory referral to Dermatology   5. Decreased hearing of right ear  H91.91 Ambulatory referral to ENT   6. Tinnitus, right  H93.11 Ambulatory referral to ENT         Last Filled:  omeprazole (PRILOSEC) 40 MG capsule 90 capsule 3 6/29/2020  No   Sig - Route: Take 1 capsule (40 mg total) by mouth daily. - Oral   Sent to pharmacy as: omeprazole 40 mg capsule,delayed release (PriLOSEC)   E-Prescribing Status: Receipt confirmed by pharmacy (6/29/2020 10:05 AM CDT)       Next OV:  Visit date not found        Medication teed up for provider signature

## 2021-07-07 NOTE — TELEPHONE ENCOUNTER
Telephone Encounter by Leatha Messer CMA at 7/7/2021  1:28 PM     Author: Leatha Messer CMA Service: -- Author Type: Certified Medical Assistant    Filed: 7/7/2021  1:30 PM Encounter Date: 7/7/2021 Status: Signed    : Leatha Messer CMA (Certified Medical Assistant)       Prior Authorization Request  Whos requesting:  Pharmacy  Pharmacy Name and Location: [facility]   Medication Name: Omeprozole  Insurance Plan: [facility]  Insurance Member ID Number:  [id number]  CoverMyMeds Key: N/A  Informed patient that prior authorizations can take up to 10 business days for response:   Yes  Okay to leave a detailed message: Yes

## 2021-08-31 DIAGNOSIS — I10 ESSENTIAL HYPERTENSION WITH GOAL BLOOD PRESSURE LESS THAN 130/85: Primary | ICD-10-CM

## 2021-08-31 RX ORDER — LOSARTAN POTASSIUM 50 MG/1
50 TABLET ORAL DAILY
Qty: 90 TABLET | Refills: 1 | Status: SHIPPED | OUTPATIENT
Start: 2021-08-31 | End: 2021-12-10

## 2021-09-01 DIAGNOSIS — I10 ESSENTIAL HYPERTENSION WITH GOAL BLOOD PRESSURE LESS THAN 130/85: Primary | ICD-10-CM

## 2021-09-01 RX ORDER — TRIAMTERENE AND HYDROCHLOROTHIAZIDE 75; 50 MG/1; MG/1
1 TABLET ORAL DAILY
Qty: 90 TABLET | Refills: 1 | Status: SHIPPED | OUTPATIENT
Start: 2021-09-01 | End: 2021-12-10

## 2021-09-19 ENCOUNTER — HEALTH MAINTENANCE LETTER (OUTPATIENT)
Age: 51
End: 2021-09-19

## 2021-10-08 ENCOUNTER — TELEPHONE (OUTPATIENT)
Dept: FAMILY MEDICINE | Facility: CLINIC | Age: 51
End: 2021-10-08

## 2021-10-08 ENCOUNTER — NURSE TRIAGE (OUTPATIENT)
Dept: NURSING | Facility: CLINIC | Age: 51
End: 2021-10-08

## 2021-10-08 DIAGNOSIS — K21.00 GASTROESOPHAGEAL REFLUX DISEASE WITH ESOPHAGITIS WITHOUT HEMORRHAGE: Primary | ICD-10-CM

## 2021-10-08 RX ORDER — OMEPRAZOLE 40 MG/1
40 CAPSULE, DELAYED RELEASE ORAL DAILY
Qty: 90 CAPSULE | Refills: 1 | Status: SHIPPED | OUTPATIENT
Start: 2021-10-08 | End: 2022-03-14

## 2021-10-08 NOTE — TELEPHONE ENCOUNTER
Pt shares that he has been trying for several days to get refill of Omeprazole.    Pt continues that he knows he is passed due for PHY and that can be scheduled.  He is a  leaving town today and needs his Omeprazole.    Pt shares he is perfectly healthy and does not know why he has to come in just to get RX filled.    PHY Appt scheduled for 12/10/2021 Pt declined early appt as he prefers AM appt time.    Pt shares that he has to get the Omeprazole today.     Requesting call back today with update if he can get the RX.    Last office visit with PCP = 10/2/2020  Next office visit with PCP = 12/10/2021

## 2021-10-08 NOTE — TELEPHONE ENCOUNTER
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 If you develop symptoms such as fever, cough, or SOB or if you have concerns about the presence of another infection including coronavirus (COVID-19), please contact your health care provider or visit [email or web address].     Disposition/Instructions    Home care recommended. Follow home care protocol based instructions.    Thank you for taking steps to prevent the spread of this virus.  o Limit your contact with others.  o Wear a simple mask to cover your cough.  o Wash your hands well and often.    Resources    [facility]: About COVID-19: [email or web address]    CDC: What to Do If You're Sick: www.cdc.gov/coronavirus/2019-ncov/about/steps-when-sick.html    CDC: Ending Home Isolation: www.cdc.gov/coronavirus/2019-ncov/hcp/disposition-in-home-patients.html     CDC: Caring for Someone: www.cdc.gov/coronavirus/2019-ncov/if-you-are-sick/care-for-someone.html     [facility]: Interim Guidance for Hospital Discharge to Home: www.health.[facility].[place].us/diseases/coronavirus/hcp/hospdischarge.pdf    [facility] clinical trials (COVID-19 research studies): clinicalaffairs.[facility].edu/um-clinical-trials     Below are the COVID-19 hotlines at the [facility] ([facility]). Interpreters are available.   o For health questions: Call 260-903-9945 or 1-187.956.7333 (7 a.m. to 7 p.m.)  o For questions about schools and childcare:  Call 358-006-4275 or 1-319.466.4490 (7 a.m. to 7 p.m.)                   Reason for Disposition    Request for URGENT new prescription or refill of 'essential' medication (i.e., likelihood of harm to patient if not taken) and triager unable to fill per department policy    Protocols used: MEDICATION QUESTION CALL-A-[place]

## 2021-10-08 NOTE — TELEPHONE ENCOUNTER
Medication: Omeprazole 40mg capsule  Last Date Filled: 7/7/21 #90  Last appointment addressing medication: 10/2/20    Future visit 12/10/21  Last B/P:  BP Readings from Last 3 Encounters:   10/16/20 (!) 158/94   10/02/20 108/80   06/29/20 122/78

## 2021-10-11 DIAGNOSIS — K21.00 GASTROESOPHAGEAL REFLUX DISEASE WITH ESOPHAGITIS WITHOUT HEMORRHAGE: ICD-10-CM

## 2021-10-11 RX ORDER — OMEPRAZOLE 40 MG/1
40 CAPSULE, DELAYED RELEASE ORAL DAILY
Qty: 90 CAPSULE | Refills: 1 | Status: CANCELLED | OUTPATIENT
Start: 2021-10-11

## 2021-10-11 NOTE — TELEPHONE ENCOUNTER
Pending Prescriptions:                       Disp   Refills    omeprazole (PRILOSEC) 40 MG DR capsule    90 cap*1            Sig: Take 1 capsule (40 mg) by mouth daily      Last filled 6/9/2021

## 2021-10-12 NOTE — TELEPHONE ENCOUNTER
Refill request too soon.  Closing encounter at this time.    Cande Smiley RN   10/12/21 6:58 PM  [facility] Nurse Advisor

## 2021-12-10 ENCOUNTER — OFFICE VISIT (OUTPATIENT)
Dept: FAMILY MEDICINE | Facility: CLINIC | Age: 51
End: 2021-12-10
Payer: COMMERCIAL

## 2021-12-10 VITALS
DIASTOLIC BLOOD PRESSURE: 68 MMHG | SYSTOLIC BLOOD PRESSURE: 110 MMHG | HEIGHT: 75 IN | HEART RATE: 83 BPM | RESPIRATION RATE: 18 BRPM | WEIGHT: 255 LBS | TEMPERATURE: 98.3 F | BODY MASS INDEX: 31.71 KG/M2 | OXYGEN SATURATION: 97 %

## 2021-12-10 DIAGNOSIS — M79.89 LEG SWELLING: ICD-10-CM

## 2021-12-10 DIAGNOSIS — Z12.11 ENCOUNTER FOR SCREENING COLONOSCOPY: ICD-10-CM

## 2021-12-10 DIAGNOSIS — Z11.59 NEED FOR HEPATITIS C SCREENING TEST: ICD-10-CM

## 2021-12-10 DIAGNOSIS — Z13.220 LIPID SCREENING: ICD-10-CM

## 2021-12-10 DIAGNOSIS — Z11.4 SCREENING FOR HIV (HUMAN IMMUNODEFICIENCY VIRUS): ICD-10-CM

## 2021-12-10 DIAGNOSIS — Z96.651 HX OF TOTAL KNEE ARTHROPLASTY, RIGHT: ICD-10-CM

## 2021-12-10 DIAGNOSIS — Z00.00 ROUTINE GENERAL MEDICAL EXAMINATION AT A HEALTH CARE FACILITY: Primary | ICD-10-CM

## 2021-12-10 DIAGNOSIS — N62 GYNECOMASTIA: ICD-10-CM

## 2021-12-10 DIAGNOSIS — B35.3 TINEA PEDIS OF BOTH FEET: ICD-10-CM

## 2021-12-10 DIAGNOSIS — Z13.29 SCREENING FOR THYROID DISORDER: ICD-10-CM

## 2021-12-10 DIAGNOSIS — I10 ESSENTIAL HYPERTENSION WITH GOAL BLOOD PRESSURE LESS THAN 130/85: ICD-10-CM

## 2021-12-10 PROBLEM — K21.00 GASTROESOPHAGEAL REFLUX DISEASE WITH ESOPHAGITIS WITHOUT HEMORRHAGE: Status: ACTIVE | Noted: 2021-12-10

## 2021-12-10 LAB
ALBUMIN SERPL-MCNC: 4.7 G/DL (ref 3.5–5)
ALP SERPL-CCNC: 64 U/L (ref 45–120)
ALT SERPL W P-5'-P-CCNC: 45 U/L (ref 0–45)
ANION GAP SERPL CALCULATED.3IONS-SCNC: 12 MMOL/L (ref 5–18)
AST SERPL W P-5'-P-CCNC: 25 U/L (ref 0–40)
BILIRUB SERPL-MCNC: 1 MG/DL (ref 0–1)
BUN SERPL-MCNC: 12 MG/DL (ref 8–22)
CALCIUM SERPL-MCNC: 10.2 MG/DL (ref 8.5–10.5)
CHLORIDE BLD-SCNC: 99 MMOL/L (ref 98–107)
CHOLEST SERPL-MCNC: 128 MG/DL
CO2 SERPL-SCNC: 27 MMOL/L (ref 22–31)
CREAT SERPL-MCNC: 1.14 MG/DL (ref 0.7–1.3)
FASTING STATUS PATIENT QL REPORTED: YES
GFR SERPL CREATININE-BSD FRML MDRD: 75 ML/MIN/1.73M2
GLUCOSE BLD-MCNC: 98 MG/DL (ref 70–125)
HDLC SERPL-MCNC: 35 MG/DL
HIV 1+2 AB+HIV1 P24 AG SERPL QL IA: NEGATIVE
LDLC SERPL CALC-MCNC: 81 MG/DL
POTASSIUM BLD-SCNC: 4.6 MMOL/L (ref 3.5–5)
PROT SERPL-MCNC: 7.5 G/DL (ref 6–8)
SODIUM SERPL-SCNC: 138 MMOL/L (ref 136–145)
TRIGL SERPL-MCNC: 59 MG/DL
TSH SERPL DL<=0.005 MIU/L-ACNC: 1.18 UIU/ML (ref 0.3–5)

## 2021-12-10 PROCEDURE — 86803 HEPATITIS C AB TEST: CPT | Performed by: FAMILY MEDICINE

## 2021-12-10 PROCEDURE — 84443 ASSAY THYROID STIM HORMONE: CPT | Performed by: FAMILY MEDICINE

## 2021-12-10 PROCEDURE — 99396 PREV VISIT EST AGE 40-64: CPT | Performed by: FAMILY MEDICINE

## 2021-12-10 PROCEDURE — 87389 HIV-1 AG W/HIV-1&-2 AB AG IA: CPT | Performed by: FAMILY MEDICINE

## 2021-12-10 PROCEDURE — 80053 COMPREHEN METABOLIC PANEL: CPT | Performed by: FAMILY MEDICINE

## 2021-12-10 PROCEDURE — 80061 LIPID PANEL: CPT | Performed by: FAMILY MEDICINE

## 2021-12-10 PROCEDURE — 99214 OFFICE O/P EST MOD 30 MIN: CPT | Mod: 25 | Performed by: FAMILY MEDICINE

## 2021-12-10 PROCEDURE — 36415 COLL VENOUS BLD VENIPUNCTURE: CPT | Performed by: FAMILY MEDICINE

## 2021-12-10 RX ORDER — TRIAMTERENE AND HYDROCHLOROTHIAZIDE 75; 50 MG/1; MG/1
1 TABLET ORAL DAILY
Qty: 90 TABLET | Refills: 3 | Status: SHIPPED | OUTPATIENT
Start: 2021-12-10 | End: 2022-03-28

## 2021-12-10 RX ORDER — GABAPENTIN 300 MG/1
CAPSULE ORAL
Qty: 30 CAPSULE | Refills: 0 | Status: SHIPPED | OUTPATIENT
Start: 2021-12-10 | End: 2021-12-10

## 2021-12-10 RX ORDER — GABAPENTIN 300 MG/1
CAPSULE ORAL
Qty: 90 CAPSULE | Refills: 3 | Status: SHIPPED | OUTPATIENT
Start: 2021-12-10 | End: 2023-04-28

## 2021-12-10 RX ORDER — LOSARTAN POTASSIUM 50 MG/1
50 TABLET ORAL DAILY
Qty: 90 TABLET | Refills: 3 | Status: SHIPPED | OUTPATIENT
Start: 2021-12-10 | End: 2022-03-28

## 2021-12-10 ASSESSMENT — MIFFLIN-ST. JEOR: SCORE: 2099.12

## 2021-12-10 NOTE — PROGRESS NOTES
"SUBJECTIVE:   CC: Aydin Aldridge is an 51 year old male who presents for preventative health visit.       Patient has been advised of split billing requirements and indicates understanding: Yes  Healthy Habits:     Getting at least 3 servings of Calcium per day:  NO    Bi-annual eye exam:  NO    Dental care twice a year:  NO    Sleep apnea or symptoms of sleep apnea:  None    Diet:  Breakfast skipped and Other    Frequency of exercise:  None    Taking medications regularly:  Yes    Medication side effects:  None and Other    PHQ-2 Total Score: 0    Additional concerns today:  Yes    PROBLEMS TO ADD ON...  Patient stating\" i've been feeling fine\"     However, there are a few things to discuss    1. Has noted hardening areas in the breasts bilaterally without pain or any other symptoms   I am having night sweats, seems to be worse since my weight has not been in control.   Feels like little better, and coming in waves , but seems to berkley with losing weight.         2. History of Tinea pedis, and concerned with its presence. Asking for a foot exam   Right ankle pain, on and off ever since his knee surgery   Bilateral leg / ankle swelling; has started a job [place],   On and off, concerned with circulation, at nights the toes go numb      3. hypertension.   Med check and follow-up  On losartan 50 mg, triamterene-chlorothiazide 75-50 mg, requesting refill      4- L shoulder pain,   did some physical therapy for, but it continues to hurt at times,     5- R hip gets sore at times     Has not been taking Gabapentin, not sure if he should be taking it every day, he stated that it did help, he did not refill it.   Took it for the knee symptoms. Every now and then getting shooting pain in the legs and gabapentin helped with it.  Would like to continue as needed and for flareups.       Today's PHQ-2 Score:   PHQ-2 ( 1999 Pfizer) 12/10/2021   Q1: Little interest or pleasure in doing things 0   Q2: " "Feeling down, depressed or hopeless 0   PHQ-2 Score 0   PHQ-2 Total Score (12-17 Years)- Positive if 3 or more points; Administer PHQ-A if positive -   Q1: Little interest or pleasure in doing things Not at all   Q2: Feeling down, depressed or hopeless Not at all   PHQ-2 Score 0       Abuse: Current or Past(Physical, Sexual or Emotional)- No  Do you feel safe in your environment? Yes    Have you ever done Advance Care Planning? (For example, a Health Directive, POLST, or a discussion with a medical provider or your loved ones about your wishes): No, advance care planning information given to patient to review.  Advanced care planning was discussed at today's visit.    Social History     Tobacco Use     Smoking status: Never Smoker     Smokeless tobacco: Never Used   Substance Use Topics     Alcohol use: No       Alcohol Use 12/10/2021   Prescreen: >3 drinks/day or >7 drinks/week? No     Last PSA: No results found for: PSA    Reviewed orders with patient. Reviewed health maintenance and updated orders accordingly - Yes  Lab work is in process  Labs reviewed in EPIC    Reviewed and updated as needed this visit by clinical staff  Tobacco  Allergies  Meds             Reviewed and updated as needed this visit by Provider                   Review of Systems      OBJECTIVE:   /68   Pulse 83   Temp 98.3  F (36.8  C)   Resp 18   Ht 1.9 m (6' 2.8\")   Wt 115.7 kg (255 lb)   SpO2 97%   BMI 32.04 kg/m      Physical Exam  GENERAL: healthy, alert and no distress  EYES: Eyes grossly normal to inspection, PERRL and conjunctivae and sclerae normal  HENT: normal cephalic/atraumatic, nose and mouth without ulcers or lesions, oropharynx clear and oral mucous membranes moist  NECK: no adenopathy, no asymmetry, masses, or scars and thyroid normal to palpation  Chest/ RESP: mild Gynecomastia bilateral, lungs clear to auscultation - no rales, rhonchi or wheezes  CV: regular rate and rhythm, normal S1 S2, no S3 or S4, no " murmur, click or rub, no peripheral edema and peripheral pulses strong  ABDOMEN: soft, nontender, no hepatosplenomegaly, no masses and bowel sounds normal  MS: no gross musculoskeletal defects noted, no edema  SKIN: Tinea pedis in between the toe webs, no other suspicious lesions or rashes  NEURO: Normal strength and tone, mentation intact and speech normal  PSYCH: mentation appears normal, affect normal/bright    Diagnostic Test Results:  Labs reviewed in Epic    ASSESSMENT/PLAN:   (Z00.00) Routine general medical examination at a health care facility  (primary encounter diagnosis)      (B35.3) Tinea pedis of both feet  Comment: Exam findings, pathophysiology and treatment options reviewed   Plan: use the OTC antifungal preparations.   Close follow up if no significant change or improvement as anticipated.      (M79.89) Leg swelling    (I10) Essential hypertension with goal blood pressure less than 130/85  Comment: Continue current management   Plan: losartan (COZAAR) 50 MG tablet,         triamterene-HCTZ (MAXZIDE) 75-50 MG tablet,         Comprehensive metabolic panel (BMP + Alb, Alk         Phos, ALT, AST, Total. Bili, TP)            (N62) Gynecomastia  Comment: Exam findings were discussed and reassurance given   Plan: monitor and follow up with any change or worsening.     (Z12.11) Encounter for screening colonoscopy  Comment: 2019-normal repeat in 10 years      (Z11.4) Screening for HIV (human immunodeficiency virus)  Comment:   Plan: HIV Antigen Antibody Combo            (Z11.59) Need for hepatitis C screening test  Comment:   Plan: Hepatitis C Screen Reflex to HCV RNA Quant and         Genotype            (Z96.651) Hx of total knee arthroplasty, right  Comment:   Plan: gabapentin (NEURONTIN) 300 MG capsule,       (Z13.220) Lipid screening  Comment:   Plan: Lipid panel reflex to direct LDL Fasting            (Z13.29) Screening for thyroid disorder  Comment:   Plan: TSH with free T4 reflex         "      Patient has been advised of split billing requirements and indicates understanding: Yes  COUNSELING:   Reviewed preventive health counseling, as reflected in patient instructions       Regular exercise       Healthy diet/nutrition    Estimated body mass index is 32.04 kg/m  as calculated from the following:    Height as of this encounter: 1.9 m (6' 2.8\").    Weight as of this encounter: 115.7 kg (255 lb).         He reports that he has never smoked. He has never used smokeless tobacco.      Counseling Resources:  ATP IV Guidelines  Pooled Cohorts Equation Calculator  FRAX Risk Assessment  ICSI Preventive Guidelines  Dietary Guidelines for Americans, 2010  USDA's MyPlate  ASA Prophylaxis  Lung CA Screening    Arun Whitley MD  [facility]  "

## 2021-12-10 NOTE — PROGRESS NOTES
.    Answers for HPI/ROS submitted by the patient on 12/10/2021  Frequency of exercise:: None  Getting at least 3 servings of Calcium per day:: NO  Diet:: Breakfast skipped, Other  Taking medications regularly:: Yes  Medication side effects:: None, Other  Bi-annual eye exam:: NO  Dental care twice a year:: NO  Sleep apnea or symptoms of sleep apnea:: None  Additional concerns today:: Yes

## 2021-12-13 LAB — HCV AB SERPL QL IA: NONREACTIVE

## 2022-03-12 PROBLEM — Z98.890 H/O COLONOSCOPY: Status: ACTIVE | Noted: 2019-11-26

## 2022-03-12 PROBLEM — Z82.49 FAMILY HISTORY OF ISCHEMIC HEART DISEASE: Status: ACTIVE | Noted: 2019-06-07

## 2022-03-12 PROBLEM — Z98.890 HISTORY OF ESOPHAGOGASTRODUODENOSCOPY (EGD): Status: ACTIVE | Noted: 2019-02-12

## 2022-03-12 PROBLEM — E04.1 THYROID NODULE: Status: ACTIVE | Noted: 2020-10-02

## 2022-03-12 PROBLEM — K64.4 EXTERNAL HEMORRHOIDS: Status: ACTIVE | Noted: 2019-01-11

## 2022-03-12 PROBLEM — I10 ESSENTIAL HYPERTENSION: Status: ACTIVE | Noted: 2017-05-08

## 2022-03-14 ENCOUNTER — OFFICE VISIT (OUTPATIENT)
Dept: INTERNAL MEDICINE | Facility: CLINIC | Age: 52
End: 2022-03-14
Payer: COMMERCIAL

## 2022-03-14 VITALS
RESPIRATION RATE: 18 BRPM | HEIGHT: 74 IN | SYSTOLIC BLOOD PRESSURE: 112 MMHG | DIASTOLIC BLOOD PRESSURE: 70 MMHG | WEIGHT: 252 LBS | BODY MASS INDEX: 32.34 KG/M2 | TEMPERATURE: 97.8 F | OXYGEN SATURATION: 96 % | HEART RATE: 69 BPM

## 2022-03-14 DIAGNOSIS — R60.0 BILATERAL LOWER EXTREMITY EDEMA: ICD-10-CM

## 2022-03-14 DIAGNOSIS — K21.00 GASTROESOPHAGEAL REFLUX DISEASE WITH ESOPHAGITIS WITHOUT HEMORRHAGE: ICD-10-CM

## 2022-03-14 DIAGNOSIS — B35.3 TINEA PEDIS OF BOTH FEET: ICD-10-CM

## 2022-03-14 DIAGNOSIS — G60.9 IDIOPATHIC PERIPHERAL NEUROPATHY: Primary | ICD-10-CM

## 2022-03-14 DIAGNOSIS — L91.8 INFLAMED SKIN TAG: ICD-10-CM

## 2022-03-14 DIAGNOSIS — I10 PRIMARY HYPERTENSION: Chronic | ICD-10-CM

## 2022-03-14 DIAGNOSIS — L84 CORN OR CALLUS: ICD-10-CM

## 2022-03-14 DIAGNOSIS — H69.92 ETD (EUSTACHIAN TUBE DYSFUNCTION), LEFT: ICD-10-CM

## 2022-03-14 DIAGNOSIS — N52.2 DRUG-INDUCED ERECTILE DYSFUNCTION: ICD-10-CM

## 2022-03-14 PROBLEM — Z82.49 FAMILY HISTORY OF ISCHEMIC HEART DISEASE: Status: RESOLVED | Noted: 2019-06-07 | Resolved: 2022-03-14

## 2022-03-14 PROBLEM — Z98.890 HISTORY OF ESOPHAGOGASTRODUODENOSCOPY (EGD): Status: RESOLVED | Noted: 2019-02-12 | Resolved: 2022-03-14

## 2022-03-14 PROBLEM — M17.31 POST-TRAUMATIC OSTEOARTHRITIS OF RIGHT KNEE: Status: RESOLVED | Noted: 2020-07-27 | Resolved: 2022-03-14

## 2022-03-14 PROBLEM — Z12.11 ENCOUNTER FOR SCREENING COLONOSCOPY: Status: RESOLVED | Noted: 2019-01-01 | Resolved: 2022-03-14

## 2022-03-14 PROBLEM — Z98.890 STATUS POST KNEE SURGERY: Status: RESOLVED | Noted: 2020-10-15 | Resolved: 2022-03-14

## 2022-03-14 LAB
ERYTHROCYTE [SEDIMENTATION RATE] IN BLOOD BY WESTERGREN METHOD: 6 MM/HR (ref 0–15)
HBA1C MFR BLD: 5.3 % (ref 0–5.6)
TOTAL PROTEIN SERUM FOR ELP: 7.5 G/DL (ref 6–8)
TSH SERPL DL<=0.005 MIU/L-ACNC: 3.15 UIU/ML (ref 0.3–5)
VIT B12 SERPL-MCNC: 1118 PG/ML (ref 213–816)

## 2022-03-14 PROCEDURE — 84165 PROTEIN E-PHORESIS SERUM: CPT | Performed by: PATHOLOGY

## 2022-03-14 PROCEDURE — 84443 ASSAY THYROID STIM HORMONE: CPT | Performed by: INTERNAL MEDICINE

## 2022-03-14 PROCEDURE — 99214 OFFICE O/P EST MOD 30 MIN: CPT | Mod: 25 | Performed by: INTERNAL MEDICINE

## 2022-03-14 PROCEDURE — 83036 HEMOGLOBIN GLYCOSYLATED A1C: CPT | Performed by: INTERNAL MEDICINE

## 2022-03-14 PROCEDURE — 36415 COLL VENOUS BLD VENIPUNCTURE: CPT | Performed by: INTERNAL MEDICINE

## 2022-03-14 PROCEDURE — 82607 VITAMIN B-12: CPT | Performed by: INTERNAL MEDICINE

## 2022-03-14 PROCEDURE — 11200 RMVL SKIN TAGS UP TO&INC 15: CPT | Performed by: INTERNAL MEDICINE

## 2022-03-14 PROCEDURE — 86038 ANTINUCLEAR ANTIBODIES: CPT | Performed by: INTERNAL MEDICINE

## 2022-03-14 PROCEDURE — 85652 RBC SED RATE AUTOMATED: CPT | Performed by: INTERNAL MEDICINE

## 2022-03-14 PROCEDURE — 84155 ASSAY OF PROTEIN SERUM: CPT | Performed by: INTERNAL MEDICINE

## 2022-03-14 RX ORDER — PRENATAL VIT 91/IRON/FOLIC/DHA 28-975-200
COMBINATION PACKAGE (EA) ORAL AT BEDTIME
Qty: 42 G | Refills: 1 | Status: SHIPPED | OUTPATIENT
Start: 2022-03-14 | End: 2022-10-11

## 2022-03-14 RX ORDER — OMEPRAZOLE 40 MG/1
40 CAPSULE, DELAYED RELEASE ORAL DAILY
Qty: 90 CAPSULE | Refills: 1 | Status: SHIPPED | OUTPATIENT
Start: 2022-03-14 | End: 2022-06-26

## 2022-03-14 RX ORDER — SILDENAFIL 100 MG/1
50-100 TABLET, FILM COATED ORAL DAILY PRN
Qty: 10 TABLET | Refills: 4 | Status: SHIPPED | OUTPATIENT
Start: 2022-03-14 | End: 2022-07-18

## 2022-03-14 NOTE — PROGRESS NOTES
[facility]    Comprehensive and complex medical care - Chronic disease management - Shared decision making - Care coordination - Compassionate care    Patient advocacy - Rational deprescribing - Minimally disruptive medicine - Ethical focus - Customized care         Date of Service: 3/14/2022  Primary Provider: Jose Miguel Frias    Patient Care Team:  Jose Miguel Frias MD as PCP - General (Internal Medicine)  Tyrese Chang MD as MD (Orthopedics)  Romina Hartman APRN CNP as Nurse Practitioner (Nurse Practitioner - Gerontology)  Robert Tomas DO as Assigned Musculoskeletal Provider  Arun Whitley MD as Assigned PCP  Tyrone Wood MD as Assigned Heart and Vascular Provider  Juve Harvey MD as Assigned Surgical Provider          Patient's Pharmacy:    [facility] [place] - [place] - [address]  [address]   [address]  [place] [address]  Phone: [phone] Fax: [phone]    [id number] - [place], [place] - [address]  [address]  [facility] [address]  Phone: [phone] Fax: [phone]     Patient's Contacts:  Name Home Phone Work Phone Mobile Phone Relationship Lgl Grd   SANTOSMARISSA [phone] Spouse    KISHOR GRAHAM   [phone] Spouse      Patient's Insurance:    Payor: [employer] / Plan: [employer] PMAP / Product Type: HMO /            Active Problem List:  Problem List as of 3/14/2022 Reviewed: 12/23/2019  5:51 PM by Hannah Dobbins APRN CNP       Medium    Post-traumatic osteoarthritis of both knees    Post-traumatic osteoarthritis of right knee    Status post knee surgery    History of screening colonoscopy    Gastroesophageal reflux disease with esophagitis without hemorrhage    Allergic rhinitis    Esophageal reflux    Essential hypertension    External hemorrhoids    Family history of ischemic heart disease    History of  esophagogastroduodenoscopy (EGD)    H/O colonoscopy    Thyroid nodule           Current Outpatient Medications   Medication Instructions     acetaminophen (TYLENOL) 650 mg, Oral, EVERY 4 HOURS     aspirin 162 mg, Oral, DAILY     gabapentin (NEURONTIN) 300 MG capsule 1 po q hs     losartan (COZAAR) 50 mg, Oral, DAILY     Multiple Vitamins-Minerals (MULTIVITAMIN ADULT PO) Oral     omeprazole (PRILOSEC) 40 mg, Oral, DAILY     triamterene-HCTZ (MAXZIDE) 75-50 MG tablet 1 tablet, Oral, DAILY     Vitamin C 500 mg, Oral, EVERY MORNING     Social History     Social History Narrative    .        Currently drives RV's cross country at times.       Subjective:     Aydin Aldridge is a 51 year old male who comes in today for:    Chief Complaint   Patient presents with     Foot Burn     Tingling, burning in feet, is worse while trying to sleep.       Patient comes in today for follow-up of a number of issues.  I see his mother-in-law and we agreed to have him join the practice at this time.    We reviewed some issues with his feet.  This is the main reason he comes in.  Is been going on for months for him.  He notes tingling in all of his toes of both feet.  It mainly occurs at night but occurs at other times as well.  It is not getting worse but not getting better.  He does get occasional zingers or sharp shooting pains in his legs and has some back issues.  He does not have a history of diabetes and there is no obvious neurologic history in his family.  He was given gabapentin by Dr. Valentin Whitley for these pains and he uses it as needed.  We talked about possibly using this at night if needed.  He has not noticed any new loss of strength.  He has noticed a bump between his right fourth and fifth toes.  He had a bump there and now it is more irritated.    We reviewed his left ear.  He has had a little bit of ringing in this.  He is also noted some popping in the ear.  His hearing has been okay.    He does have  "some problems with edema along with high blood pressure.  He does take diuretic.  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    He does get recurrent issues with moist toes and tinea.  It does itch somewhat.    He has a skin tag on his back that become more irritated over time.  He would like this addressed if possible.    We reviewed some issues with erectile dysfunction.  He understands the hydrochlorothiazide could be contributing to this.  We gave him the option of stopping this but he would rather have a medication to use as needed to help with this as the hydrochlorothiazide works well for him.    We reviewed his other issues noted in the assessment but not specifically addressed in the HPI above.     Objective:     Wt Readings from Last 3 Encounters:   03/14/22 114.3 kg (252 lb)   12/10/21 115.7 kg (255 lb)   01/20/21 125.6 kg (276 lb 14.4 oz)     BP Readings from Last 3 Encounters:   03/14/22 112/70   12/10/21 110/68   10/16/20 (!) 158/94     /70   Pulse 69   Temp 97.8  F (36.6  C)   Resp 18   Ht 1.89 m (6' 2.4\")   Wt 114.3 kg (252 lb)   SpO2 96%   BMI 32.01 kg/m     The patient is comfortable, no acute distress.  Mood good.  Insight good.  Eyes are nonicteric.  Left ear shows some mild fluid consistent with eustachian tube dysfunction (ETD).  Neck is supple without mass.  No cervical adenopathy.  No thyromegaly. Heart regular rate and rhythm.  Lungs clear to auscultation bilaterally.  Respiratory effort is good.  Extremities no edema.  There is a resolving corn over the lateral base of the 4th right toe.    Diagnostics:     Office Visit on 12/10/2021   Component Date Value Ref Range Status     HIV Antigen Antibody Combo 12/10/2021 Negative  Negative Final     Hepatitis C Antibody 12/10/2021 Nonreactive  Nonreactive Final     Cholesterol 12/10/2021 128  <=199 mg/dL Final     Triglycerides 12/10/2021 59  <=149 mg/dL Final     Direct Measure HDL 12/10/2021 35 (A) " >=40 mg/dL Final    HDL Cholesterol Reference Range:     0-2 years:   No reference ranges established for patients under 2 years old  at [facility] for lipid analytes.    2-8 years:  Greater than 45 mg/dL     18 years and older:   Female: Greater than or equal to 50 mg/dL   Male:   Greater than or equal to 40 mg/dL     LDL Cholesterol Calculated 12/10/2021 81  <=129 mg/dL Final     Patient Fasting > 8hrs? 12/10/2021 Yes   Final     Sodium 12/10/2021 138  136 - 145 mmol/L Final     Potassium 12/10/2021 4.6  3.5 - 5.0 mmol/L Final     Chloride 12/10/2021 99  98 - 107 mmol/L Final     Carbon Dioxide (CO2) 12/10/2021 27  22 - 31 mmol/L Final     Anion Gap 12/10/2021 12  5 - 18 mmol/L Final     Urea Nitrogen 12/10/2021 12  8 - 22 mg/dL Final     Creatinine 12/10/2021 1.14  0.70 - 1.30 mg/dL Final     Calcium 12/10/2021 10.2  8.5 - 10.5 mg/dL Final     Glucose 12/10/2021 98  70 - 125 mg/dL Final     Alkaline Phosphatase 12/10/2021 64  45 - 120 U/L Final     AST 12/10/2021 25  0 - 40 U/L Final     ALT 12/10/2021 45  0 - 45 U/L Final     Protein Total 12/10/2021 7.5  6.0 - 8.0 g/dL Final     Albumin 12/10/2021 4.7  3.5 - 5.0 g/dL Final     Bilirubin Total 12/10/2021 1.0  0.0 - 1.0 mg/dL Final     GFR Estimate 12/10/2021 75  >60 mL/min/1.73m2 Final    As of July 11, 2021, eGFR is calculated by the CKD-EPI creatinine equation, without race adjustment. eGFR can be influenced by muscle mass, exercise, and diet. The reported eGFR is an estimation only and is only applicable if the renal function is stable.     TSH 12/10/2021 1.18  0.30 - 5.00 uIU/mL Final        Results for orders placed or performed in visit on 03/14/22   Vitamin B12     Status: Abnormal   Result Value Ref Range    Vitamin B12 1,118 (H) 213 - 816 pg/mL   ESR: Erythrocyte sedimentation rate     Status: Normal   Result Value Ref Range    Erythrocyte Sedimentation Rate 6 0 - 15 mm/hr   TSH     Status: Normal   Result Value Ref Range    TSH 3.15 0.30 -  5.00 uIU/mL   Hemoglobin A1c     Status: Normal   Result Value Ref Range    Hemoglobin A1C 5.3 0.0 - 5.6 %   Total Protein, Serum for ELP     Status: Normal   Result Value Ref Range    Total Protein Serum for ELP 7.5 6.0 - 8.0 g/dL   Protein electrophoresis     Status: None (In process)    Narrative    The following orders were created for panel order Protein electrophoresis.  Procedure                               Abnormality         Status                     ---------                               -----------         ------                     Total Protein, Serum for...[[id number]]  Normal              Final result               Protein Electrophoresis,...[[id number]]                      In process                   Please view results for these tests on the individual orders.       Assessment:     1. Idiopathic peripheral neuropathy    2. Gastroesophageal reflux disease with esophagitis without hemorrhage    3. Drug-induced erectile dysfunction    4. Bilateral lower extremity edema    5. Inflamed skin tag    6. ETD (Eustachian tube dysfunction), left    7. Corn or callus    8. Tinea pedis of both feet    9. Primary hypertension         Plan:     1. Check blood work today.     2. Likely peripheral neuropathy (PN) causing toe symptoms.  3. Consider neurology consultation or electromyography (EMG) if needed.  4. Trial of sildenafil (Viagra) for erectile dysfunction (ED).  5. Continue triamterene/hydrochlorothiazide given lower extremity edema.  6. Skin tag removed using a pickups and a iris scissors with complete removal done.  7. Decongestant as needed for eustachian tube dysfunction (ETD).  8. Cotton balls between toes for corn.  9. Terbinafine for tinea pedis.  Could use oral if needed.  Likely hyperhidrotic.  10. Continue current medications otherwise.    Follow up sooner if issues.  Call the patient if problems with the lab work, otherwise can communicate through my chart.    Jose Miguel Frias MD  General  Internal Medicine  [facility]    Return in about 9 months (around 12/14/2022), or if symptoms worsen or fail to improve, for physical.     No future appointments.      Wt Readings from Last 20 Encounters:   03/14/22 114.3 kg (252 lb)   12/10/21 115.7 kg (255 lb)   01/20/21 125.6 kg (276 lb 14.4 oz)   01/04/21 125.6 kg (277 lb)   10/15/20 125.9 kg (277 lb 9 oz)   10/02/20 126.2 kg (278 lb 4.8 oz)   09/28/20 125.2 kg (276 lb)   06/29/20 128 kg (282 lb 1.6 oz)   11/21/19 127.9 kg (282 lb)   09/13/19 125.1 kg (275 lb 12.8 oz)   06/07/19 119.3 kg (263 lb)   05/22/19 123.4 kg (272 lb)   05/10/19 124.6 kg (274 lb 11.2 oz)   04/26/19 123.4 kg (272 lb)   03/15/19 124.3 kg (274 lb)   02/21/19 124.9 kg (275 lb 4.8 oz)   01/11/19 122.7 kg (270 lb 8 oz)   12/27/18 124.3 kg (274 lb)   11/20/18 126.1 kg (278 lb)   09/06/18 123.7 kg (272 lb 11.2 oz)     BP Readings from Last 20 Encounters:   03/14/22 112/70   12/10/21 110/68   10/16/20 (!) 158/94   10/02/20 108/80   06/29/20 122/78   09/13/19 128/80   05/07/04 140/76   04/12/04 138/82   08/18/03 132/90      Pulse Readings from Last 20 Encounters:   03/14/22 69   12/10/21 83   10/16/20 82   10/02/20 92   06/29/20 80   09/13/19 88   05/07/04 84   04/12/04 84   08/18/03 62     SpO2 Readings from Last 20 Encounters:   03/14/22 96%   12/10/21 97%   10/16/20 98%   10/02/20 97%   09/13/19 97%

## 2022-03-15 LAB — ANA SER QL IF: NEGATIVE

## 2022-03-15 NOTE — PATIENT INSTRUCTIONS
Please follow up if you have any further issues.    You may contact me by phone or MyChart if you are worsening or if things are not improving.    ______________________________________________________________________     Please remember that you can call [phone] to schedule an appointment.     You can schedule appointments 24 hours a day, 7 days a week.  Sometimes the best time to schedule an appointment is after clinic hours when less people are calling in.  Weekends are another option for calling in to schedule appointments.        Patient Education     Peripheral Neuropathy  Peripheral neuropathy is the result of damage to the peripheral nerves. It usually affects the arms or legs, and causes a change in physical feeling. Sometimes it causes weakness in the muscles. You may feel tingling, numbness or shooting pains. Symptoms may be more common at night. Skin may be extra sensitive to light touch or temperature changes.  Neuropathy may be caused by a complication of a chronic disease such as diabetes, virus or bacterial infections, or physical injury. A ruptured disk with pressure on the spinal nerve may also lead to the problem. Certain vitamin deficiencies may also lead to it. It may also be caused by exposure to certain drugs or chemicals.  Home care    Tell the healthcare provider about all medicines you take. This includes prescription and over-the-counter medicines, vitamins, and herbs. Ask if any of the medicines may be causing your problems. Don't make any changes to prescription medicines without talking to your healthcare provider first.    You may be prescribed medicines to help relieve the tingling feeling or for pain. Take all medicines as directed.    A numb hand or foot may be more prone to injury. To help protect it:  ? Always use oven mitts.  ? Test water with an unaffected hand or foot.  ? Use caution when trimming nails. File sharp areas.  ? Wear shoes that fit well to avoid pressure  points, blisters, and ulcers.  ? Inspect your hands and feet carefully (including the soles of your feet and between your toes) at least once a week. If you see red areas, sores, or other problems, tell your healthcare provider.    Follow-up care  Follow up with your doctor or as advised by our staff. You may need further testing or evaluation.  When to seek medical advice  Call your healthcare provider right away if any of the following occur:    Redness, swelling, cracking, or ulcer on any numb area, especially the feet    New symptoms of numbness or muscle weakness numbness    Loss of bowel or bladder control    Slurred speech, confusion, or trouble speaking, walking, or seeing  [email or web address] last reviewed this educational content on 3/1/2018    4152-8332 [employer]. All rights reserved. This information is not intended as a substitute for professional medical care. Always follow your healthcare professional's instructions.           Patient Education     Treating Peripheral Neuropathy    Peripheral neuropathy is a disease of the nerves. It most often starts in your feet and may also eventually affect the arms. It may cause pain or may make you unable to sense pain. Sometimes, weakness occurs as well. Lack of pain and weakness makes you more likely to injure yourself without knowing it.  Learn ways to protect your feet. Check your feet daily for wounds you may not have felt. Avoid burns by testing bath water with your elbow before stepping in. Also, always wear shoes to prevent injury.  Regular foot care  If you have foot numbness, you may not notice cutting yourself while trimming your nails. To prevent problems, your healthcare provider may ask you to visit for nail and callus trimming. See your provider for foot care as often as suggested.  Check your feet daily  Catch problems early by checking your feet every day for changes. Look at the top and bottom of your feet, your heels, and between your  toes. It may help to use a mirror. If this is hard, ask someone to check for you. Call your healthcare provider if you notice a wound, ulceration, ingrown nail, or any changes in your feet. This includes increased heat, swelling, numbness, tingling, pain, and redness.  Wear proper footwear  Always wear shoes and socks, even indoors. Ask your healthcare provider how to choose the right shoe. After buying shoes, bring them to your doctor to be checked for proper fit. Take new shoes off every hour or so to check for red pressure areas on your feet. Each time you put on your shoes, use your fingers first to feel inside for foreign objects.  Common causes of peripheral neuropathy  Some common causes of peripheral neuropathy include:    Diabetes or other endocrine disorders    Toxins (such as alcohol)    Nutritional deficiencies (such as Vitamin B-12)    Kidney disease    Injury    Repetitive stress (such as carpal tunnel syndrome)    Autoimmune disease    Cancer and tumors    Chemotherapy     Arthritis    Advanced age    Heredity    Infection  Diagnosis and treatment  Diagnosis of peripheral neuropathy includes a complete history and physical exam.  Lab tests including blood work and imaging often help determine the cause. Special nerve tests are often helpful including nerve conduction velocity studies (NCV), and electromyography (EMG).  Treatment focuses on treating the underlying disorder and treating symptoms through the use of medicines, injections, TENS (transcutaneous electrical nerve stimulation), acupuncture, massage, and other methods.  [employer] last reviewed this educational content on 1/1/2018 2000-2021 [employer]. All rights reserved. This information is not intended as a substitute for professional medical care. Always follow your healthcare professional's instructions.           Patient Education     What is Peripheral Neuropathy?     Peripheral neuropathy symptoms often start in the toes  and move up the foot.   Peripheral neuropathy is a disease of the nerves. It most often starts in your feet and may also eventually affect the arms. It can affect sensory, motor, or both functions. It may cause pain or make you unable to sense pain. Sometimes weakness occurs as well. Lack of pain and weakness makes you more likely to injure yourself without knowing it. But you can learn ways to protect your feet from injury.   Nerves that control bodily functions such as blood pressure, sweating, and heart rate are called autonomic nerves. Neuropathy can also affect these nerves and cause serious disability.   When nerves are diseased  Nerves in your feet carry signals to your brain. Your brain reads those signals and interprets them as sensations. When nerves in your feet are diseased, signals may be disrupted or changed. The result may be a lack of feeling (numbness) in your feet or other symptoms, such as tingling or pain.   Symptoms mask pain  Symptoms of peripheral neuropathy usually start in your toes. The symptoms slowly spread up your feet and legs as more nerve is affected. These symptoms may decrease sensation in your feet or mask pain. Without pain, you may not notice a cut or even a bone fracture. Cuts may become infected. Fractures may heal poorly and lead to foot deformity.   Common causes of peripheral neuropathy  Some common causes of peripheral neuropathy include:    Diabetes or other endocrine disorders    Toxins (such as alcohol)    Nutritional deficiencies (such as Vitamin B-12)    Kidney disease    Injury    Repetitive stress (such as carpal tunnel syndrome)    Autoimmune disease    Cancer and tumors    Chemotherapy cancer treatment    Arthritis    Advanced age    Hereditary disorders    Neurological disorders    Infection    Loss of balance    Passing out when standing too fast  Diagnosis and treatment  Diagnosis of peripheral neuropathy includes a complete history and physical exam. Tests  include blood tests and imaging often help find the cause. Special nerve tests are often helpful including nerve conduction velocity studies (NCV), and electromyography (EMG). NCV helps find evidence of poor conduction of nerve signals. EMG helps tell whether symptoms are caused by muscle or nerve disorders. Genetic tests can be done if your healthcare provider suspects a hereditary neuropathy.   Treatment focuses on treating the underlying disorder and treating the symptoms using medicines, injections, TENS (transcutaneous electrical nerve stimulation), acupuncture, massage, and others.   [employer] last reviewed this educational content on 4/1/2020 2000-2021 [employer]. All rights reserved. This information is not intended as a substitute for professional medical care. Always follow your healthcare professional's instructions.    Patient Education     Athlete s Foot     Athlete s foot (tinea pedis) is caused by a fungal infection in the skin. It affects the skin between the toes, causing cracks in the skin called fissures. It can also affect the bottom of the foot where it causes dry white scales and peeling of the skin. This infection is more likely to occur when the foot is in hot, sweaty socks and shoes for long periods of time. You may feel itching and burning between your toes. This infection is treated with skin creams or medicine taken by mouth.  Home care  The following are general care guidelines:    It's important to keep the feet dry. Use absorbent cotton socks and change them if they become sweaty. Or wear an open-toe shoe or sandal. Wash the feet at least once a day with soap and water.    Apply the antifungal cream as prescribed. Some antifungal creams are available without a prescription.    It may take a week before the rash starts to improve. It can take about 3 to 4 weeks to completely clear. Continue the medicine until the rash is all gone.    Use over-the-counter antifungal  powders or sprays on your feet after exposure to high-risk environments, such as public showers, gyms, and locker rooms. This can help prevent future infections. Wearing appropriate shoes in these situations can help.  Prevention  These tips may help prevent athlete s foot:    Don't share shoes or socks with someone who has athlete's foot.    Don't walk barefoot in places where a fungal infection can spread quickly such as locker rooms, showers, and swimming pools.    Change your socks regularly.    Alternate shoes to help with drying.  Follow-up care  Follow up with your healthcare provider as recommended if the rash doesn't improve after 10 days of treatment, or if the rash continues to spread.  When to seek medical care  Get medical attention right away if any of the following occur:    Fever of 100.4 F (38 C) or higher, or as directed    Increasing redness or swelling of the foot    Infection comes back soon after treatment    Pus draining from cracks in the skin  [employer] last reviewed this educational content on 7/1/2019 2000-2021 [employer]. All rights reserved. This information is not intended as a substitute for professional medical care. Always follow your healthcare professional's instructions.

## 2022-03-18 LAB
ALBUMIN PERCENT: 65.2 % (ref 51–67)
ALBUMIN SERPL ELPH-MCNC: 4.9 G/DL (ref 3.2–4.7)
ALPHA 1 PERCENT: 2.1 % (ref 2–4)
ALPHA 2 PERCENT: 8.4 % (ref 5–13)
ALPHA1 GLOB SERPL ELPH-MCNC: 0.2 G/DL (ref 0.1–0.3)
ALPHA2 GLOB SERPL ELPH-MCNC: 0.6 G/DL (ref 0.4–0.9)
B-GLOBULIN SERPL ELPH-MCNC: 0.9 G/DL (ref 0.7–1.2)
BETA PERCENT: 11.4 % (ref 10–17)
GAMMA GLOB SERPL ELPH-MCNC: 1 G/DL (ref 0.6–1.4)
GAMMA GLOBULIN PERCENT: 12.9 % (ref 9–20)
PATH ICD:: ABNORMAL
PROT PATTERN SERPL ELPH-IMP: ABNORMAL
REVIEWING PATHOLOGIST: ABNORMAL
TOTAL PROTEIN SERUM FOR ELP (SYNCED VALUE): 7.5 G/DL

## 2022-06-26 DIAGNOSIS — K21.00 GASTROESOPHAGEAL REFLUX DISEASE WITH ESOPHAGITIS WITHOUT HEMORRHAGE: ICD-10-CM

## 2022-06-26 RX ORDER — OMEPRAZOLE 40 MG/1
CAPSULE, DELAYED RELEASE ORAL
Qty: 90 CAPSULE | Refills: 2 | Status: SHIPPED | OUTPATIENT
Start: 2022-06-26 | End: 2023-04-27

## 2022-06-27 NOTE — TELEPHONE ENCOUNTER
"Last Written Prescription Date:  3/14/22  Last Fill Quantity: 90,  # refills: 1   Last office visit provider:  3/14/22     Requested Prescriptions   Pending Prescriptions Disp Refills     omeprazole (PRILOSEC) 40 MG DR capsule [Pharmacy Med Name: OMEPRAZOLE 40MG CAPSULES] 90 capsule 1     Sig: TAKE 1 CAPSULE(40 MG) BY MOUTH DAILY       PPI Protocol Passed - 6/26/2022  7:00 AM        Passed - Not on Clopidogrel (unless Pantoprazole ordered)        Passed - No diagnosis of osteoporosis on record        Passed - Recent (12 mo) or future (30 days) visit within the authorizing provider's specialty     Patient has had an office visit with the authorizing provider or a provider within the authorizing providers department within the previous 12 mos or has a future within next 30 days. See \"Patient Info\" tab in inbasket, or \"Choose Columns\" in Meds & Orders section of the refill encounter.              Passed - Medication is active on med list        Passed - Patient is age 18 or older             Tejal Dukes 06/26/22 7:24 PM  "

## 2022-07-12 ENCOUNTER — VIRTUAL VISIT (OUTPATIENT)
Dept: FAMILY MEDICINE | Facility: CLINIC | Age: 52
End: 2022-07-12
Payer: COMMERCIAL

## 2022-07-12 DIAGNOSIS — J01.00 ACUTE NON-RECURRENT MAXILLARY SINUSITIS: Primary | ICD-10-CM

## 2022-07-12 PROCEDURE — 99214 OFFICE O/P EST MOD 30 MIN: CPT | Mod: 95 | Performed by: FAMILY MEDICINE

## 2022-07-12 RX ORDER — AMOXICILLIN 500 MG/1
1000 CAPSULE ORAL 2 TIMES DAILY
Qty: 40 CAPSULE | Refills: 0 | Status: SHIPPED | OUTPATIENT
Start: 2022-07-12 | End: 2022-07-22

## 2022-07-12 NOTE — PROGRESS NOTES
"Aydin is a 51 year old who is being evaluated via a billable video visit.      How would you like to obtain your AVS? MyChart  If the video visit is dropped, the invitation should be resent by: Text to cell phone: [phone]  Will anyone else be joining your video visit? No        Assessment & Plan     Acute non-recurrent maxillary sinusitis  Will treat with Abx , also discussed decongestants , nasal saline rinses , push fluids etc , rest   - amoxicillin (AMOXIL) 500 MG capsule; Take 2 capsules (1,000 mg) by mouth 2 times daily for 10 days      RTC if no improving or worsening.  Pt is aware  and comfortable with the current plan.    BMI:   Estimated body mass index is 32.01 kg/m  as calculated from the following:    Height as of 3/14/22: 1.89 m (6' 2.4\").    Weight as of 3/14/22: 114.3 kg (252 lb).           Vera Bruno MD  [facility]    Subjective   Aydin is a 51 year old, presenting for the following health issues:  No chief complaint on file.      Per pt states that illness is not Covid related family had virus and they where given antibiotics for symptoms and got better requesting the same.    History of Present Illness       Reason for visit:  Severe cold with congestion  Symptom onset:  3-7 days ago  Symptoms include:  Cough, sore throat. Congestion. Sore glands achy body hacking up mucus  Symptom intensity:  Severe  Symptom progression:  Worsening  Had these symptoms before:  Yes  Has tried/received treatment for these symptoms:  Yes  Previous treatment was successful:  Yes  Prior treatment description:  Antibiotics  What makes it worse:  Just the sickness  What makes it better:  Not at this present time    He eats 2-3 servings of fruits and vegetables daily.He consumes 1 sweetened beverage(s) daily.He exercises with enough effort to increase his heart rate 9 or less minutes per day.  He exercises with enough effort to increase his heart rate 3 or less days per week.   He is taking " medications regularly.     He has been sick , both son and wife have been sick and on Abx   Glands are sore and swollen, sinus plugged up and congested , OTC taking with dayquil and nyquil   2 months ago was   Asthma , no but when he gets sick he starts to wheeze , some shortness of breath and light headed , sore throat   When he got strep throat he felt like the same voice straining   For four days now , no fever some chills on , body aches some , ears are plugged and hurting both wife and son negative home test for them , sinus pressure and ears plugged L more than right coughing up mucus shortness , no wheezing has had wheezing after URI in the past and is concerned about this         Review of Systems   Constitutional, HEENT, cardiovascular, pulmonary, GI, , musculoskeletal, neuro, skin, endocrine and psych systems are negative, except as otherwise noted.      Objective           Vitals:  No vitals were obtained today due to virtual visit.    Physical Exam   GENERAL: Healthy, alert and no distress  EYES: Eyes grossly normal to inspection.  No discharge or erythema, or obvious scleral/conjunctival abnormalities.  RESP: No audible wheeze, cough, or visible cyanosis.  No visible retractions or increased work of breathing.    SKIN: Visible skin clear. No significant rash, abnormal pigmentation or lesions.  NEURO: Cranial nerves grossly intact.  Mentation and speech appropriate for age.  PSYCH: Mentation appears normal, affect normal/bright, judgement and insight intact, normal speech and appearance well-groomed.    No results found for this or any previous visit (from the past 24 hour(s)).            Video-Visit Details    Video Start Time: 11:22     Type of service:  Video Visit    Video End Time:11:32 AM    Originating Location (pt. Location): Home    Distant Location (provider location):  [facility]     Platform used for Video Visit: [employer]    .  ..

## 2022-10-11 DIAGNOSIS — B35.3 TINEA PEDIS OF BOTH FEET: ICD-10-CM

## 2022-10-11 RX ORDER — PRENATAL VIT 91/IRON/FOLIC/DHA 28-975-200
COMBINATION PACKAGE (EA) ORAL
Qty: 45 G | Refills: 3 | Status: SHIPPED | OUTPATIENT
Start: 2022-10-11

## 2022-11-21 ENCOUNTER — HEALTH MAINTENANCE LETTER (OUTPATIENT)
Age: 52
End: 2022-11-21

## 2023-04-16 ENCOUNTER — HEALTH MAINTENANCE LETTER (OUTPATIENT)
Age: 53
End: 2023-04-16

## 2023-04-18 DIAGNOSIS — I10 ESSENTIAL HYPERTENSION WITH GOAL BLOOD PRESSURE LESS THAN 130/85: ICD-10-CM

## 2023-04-19 ENCOUNTER — TELEPHONE (OUTPATIENT)
Dept: CARDIOLOGY | Facility: CLINIC | Age: 53
End: 2023-04-19
Payer: COMMERCIAL

## 2023-04-19 DIAGNOSIS — I10 ESSENTIAL HYPERTENSION WITH GOAL BLOOD PRESSURE LESS THAN 130/85: ICD-10-CM

## 2023-04-19 RX ORDER — LOSARTAN POTASSIUM 50 MG/1
TABLET ORAL
Qty: 90 TABLET | Refills: 0 | OUTPATIENT
Start: 2023-04-19

## 2023-04-19 RX ORDER — TRIAMTERENE AND HYDROCHLOROTHIAZIDE 75; 50 MG/1; MG/1
1 TABLET ORAL DAILY
Qty: 90 TABLET | Refills: 0 | OUTPATIENT
Start: 2023-04-19

## 2023-04-19 NOTE — TELEPHONE ENCOUNTER
[facility] Call Center    Phone Message    May a detailed message be left on voicemail: yes     Reason for Call: Medication Refill Request    Has the patient contacted the pharmacy for the refill? Yes   Name of medication being requested: Losartan-triam terene  Provider who prescribed the medication: Dr. Wood  Pharmacy: [id number] [place], [place] - [address] AT [facility] [address]    Date medication is needed: 04/20/23   Patient has 1 week left of medication      Action Taken: Other: cardiology    Travel Screening: Not Applicable   Thank you!  [facility]

## 2023-04-20 RX ORDER — LOSARTAN POTASSIUM 50 MG/1
50 TABLET ORAL DAILY
Qty: 90 TABLET | Refills: 0 | Status: SHIPPED | OUTPATIENT
Start: 2023-04-20 | End: 2023-04-27

## 2023-04-20 RX ORDER — TRIAMTERENE AND HYDROCHLOROTHIAZIDE 75; 50 MG/1; MG/1
1 TABLET ORAL DAILY
Qty: 90 TABLET | Refills: 0 | Status: SHIPPED | OUTPATIENT
Start: 2023-04-20 | End: 2023-04-27

## 2023-04-27 ENCOUNTER — VIRTUAL VISIT (OUTPATIENT)
Dept: INTERNAL MEDICINE | Facility: CLINIC | Age: 53
End: 2023-04-27
Payer: COMMERCIAL

## 2023-04-27 DIAGNOSIS — R60.0 BILATERAL LOWER EXTREMITY EDEMA: ICD-10-CM

## 2023-04-27 DIAGNOSIS — I10 PRIMARY HYPERTENSION: Primary | Chronic | ICD-10-CM

## 2023-04-27 DIAGNOSIS — K21.00 GASTROESOPHAGEAL REFLUX DISEASE WITH ESOPHAGITIS WITHOUT HEMORRHAGE: ICD-10-CM

## 2023-04-27 DIAGNOSIS — G60.9 IDIOPATHIC PERIPHERAL NEUROPATHY: ICD-10-CM

## 2023-04-27 PROCEDURE — 99214 OFFICE O/P EST MOD 30 MIN: CPT | Mod: VID | Performed by: INTERNAL MEDICINE

## 2023-04-27 RX ORDER — LOSARTAN POTASSIUM 50 MG/1
50 TABLET ORAL DAILY
Qty: 90 TABLET | Refills: 3 | Status: SHIPPED | OUTPATIENT
Start: 2023-04-27 | End: 2024-06-20

## 2023-04-27 RX ORDER — OMEPRAZOLE 40 MG/1
40 CAPSULE, DELAYED RELEASE ORAL DAILY
Qty: 90 CAPSULE | Refills: 3 | Status: SHIPPED | OUTPATIENT
Start: 2023-04-27 | End: 2023-07-22

## 2023-04-27 RX ORDER — TRIAMTERENE AND HYDROCHLOROTHIAZIDE 75; 50 MG/1; MG/1
1 TABLET ORAL DAILY
Qty: 90 TABLET | Refills: 3 | Status: SHIPPED | OUTPATIENT
Start: 2023-04-27 | End: 2024-06-20

## 2023-04-27 NOTE — PROGRESS NOTES
[facility] - Primary Care Specialists     VIDEO VISIT     Comprehensive and complex medical care - Chronic disease management - Shared decision making - Care coordination - Compassionate care    Patient advocacy - Rational deprescribing - Minimally disruptive medicine - Ethical focus - Customized care         Aydin Aldridge is a 52 year old male who is being evaluated via a billable video visit.           Active Problem List:  Problem List as of 4/27/2023 Reviewed: 12/23/2019  5:51 PM by Hannah Dobbins APRN CNP       High    Nonsmoker       Medium    HTN (hypertension)       Low    Allergic rhinitis    External hemorrhoids    Normal Colonoscopy - 2019    Thyroid nodule       Other    Post-traumatic osteoarthritis of both knees       Other    Gastroesophageal reflux disease with esophagitis without hemorrhage        Current Outpatient Medications   Medication Instructions     aspirin 162 mg, Oral, DAILY     losartan (COZAAR) 50 mg, Oral, DAILY     Multiple Vitamins-Minerals (MULTIVITAMIN ADULT PO) Oral     omeprazole (PRILOSEC) 40 mg, Oral, DAILY     terbinafine (LAMISIL) 1 % external cream APPLY TOPICALLY BETWEEN ALL TOES AT BEDTIME FOR 1 MONTH. APPLY SOCKS BEFORE GOING TO BEDTIME. AVOID SOCKS OR SHOES THAT MAKE TOO FOOT MOIST     triamterene-HCTZ (MAXZIDE) 75-50 MG tablet 1 tablet, Oral, DAILY     Vitamin C 500 mg, Oral, EVERY MORNING       Subjective:     Aydin Aldridge presents with:      Chief Complaint   Patient presents with     Recheck Medication     Patient wants to talk about refills         Comes in today by video to review his health.    Reviewed his hypertension and his blood pressure has been high at times.  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  Has not been following his blood pressures regularly.  No chest pain or chest pressure.  Still has some mild edema at times.  Taking medications regularly.    Reviewed his peripheral neuropathy (PN) and discussed this.   It seems to be a bit worse for him.  Working on watching weight.  Discussed options with this.  Reviewed the gabapentin (Neurontin).    Reflux doing okay.    We reviewed his other issues noted in the assessment but not specifically addressed in the HPI above.     Objective:     Exam reveals:  Patient in no distress.  Mood is good.  Insight is good.  No dyspnea.    Diagnostics:     Office Visit on 03/14/2022   Component Date Value Ref Range Status     Vitamin B12 03/14/2022 1,118 (H)  213 - 816 pg/mL Final     Erythrocyte Sedimentation Rate 03/14/2022 6  0 - 15 mm/hr Final     RAFI interpretation 03/14/2022 Negative  Negative Final      Negative:              <1:40  Borderline Positive:   1:40 - 1:80  Positive:              >1:80     TSH 03/14/2022 3.15  0.30 - 5.00 uIU/mL Final     Hemoglobin A1C 03/14/2022 5.3  0.0 - 5.6 % Final    Normal <5.7%   Prediabetes 5.7-6.4%    Diabetes 6.5% or higher     Note: Adopted from ADA consensus guidelines.     Total Protein Serum for ELP 03/14/2022 7.5  6.0 - 8.0 g/dL Final     Albumin % 03/14/2022 65.2  51.0 - 67.0 % Final     Albumin 03/14/2022 4.9 (H)  3.2 - 4.7 g/dL Final     Alpha 1 % 03/14/2022 2.1  2.0 - 4.0 % Final     Alpha 1 03/14/2022 0.2  0.1 - 0.3 g/dL Final     Alpha 2 % 03/14/2022 8.4  5.0 - 13.0 % Final     Alpha 2 03/14/2022 0.6  0.4 - 0.9 g/dL Final     Beta % 03/14/2022 11.4  10.0 - 17.0 % Final     Beta Globulin 03/14/2022 0.9  0.7 - 1.2 g/dL Final     Gamma Globulin % 03/14/2022 12.9  9.0 - 20.0 % Final     Gamma Globulin 03/14/2022 1.0  0.6 - 1.4 g/dL Final     ELP Interpretation 03/14/2022 The albumin fraction is slightly to moderately elevated. This finding is sometimes associated with hemoconcentration or protein hypometabolism, but is of doubtful clinical significance in asymptomatic patients. The electrophoresis is otherwise normal.   Final     Path ICD: 03/14/2022 D47.2   Final     Reviewing Pathologist 03/14/2022 Jose Miguel Masters MD   Final      Total Protein Serum for ELP 03/14/2022 7.5  g/dL Final        Assessment:     1. Primary hypertension    2. Gastroesophageal reflux disease with esophagitis without hemorrhage    3. Idiopathic peripheral neuropathy    4. Bilateral lower extremity edema        Plan:     1. Follow up for physical as scheduled.  2. Blood work at that time and urinalysis.  Consider urinalysis, basic metabolic panel, complete blood count (CBC), BNP (brain natriuretic peptide).  3. Further tests as needed.  4. Could consider neurology consultation for peripheral neuropathy (PN) if desired.  Cricket?  5. Increase blood pressure medications if needed in the future - he will monitor blood pressure prior to his visit with me.    Video visit duration:  15 minutes    22 minutes or greater was spent today on the patient's care on the day of service.      This includes time for chart preparation, reviewing medical tests done before or during the visit, talking with the patient, review of quality indicators, required documentation, and other elements of care.    No follow-ups on file.     Future Appointments   Date Time Provider Department Center   5/16/2023  2:30 PM Jose Miguel Frias MD [facility]   7/17/2023 10:50 AM Tyrone Wood MD [facility]            Jose Miguel Frias MD  General Internal Medicine  [facility]  ______________________________________________________________________     Video visit details    Video invitation sent by: INDERJIT    Patient location:  Home    Provider location: [facility]    Mode of Communication:  VIDEO LINK    TIME START AND STOP:   10:38 am and 10:53 am.

## 2023-04-28 PROBLEM — G60.9 IDIOPATHIC PERIPHERAL NEUROPATHY: Status: ACTIVE | Noted: 2023-04-28

## 2023-05-16 ENCOUNTER — OFFICE VISIT (OUTPATIENT)
Dept: INTERNAL MEDICINE | Facility: CLINIC | Age: 53
End: 2023-05-16
Payer: COMMERCIAL

## 2023-05-16 VITALS
OXYGEN SATURATION: 99 % | DIASTOLIC BLOOD PRESSURE: 80 MMHG | HEART RATE: 90 BPM | HEIGHT: 75 IN | TEMPERATURE: 98.4 F | RESPIRATION RATE: 18 BRPM | BODY MASS INDEX: 35.06 KG/M2 | SYSTOLIC BLOOD PRESSURE: 136 MMHG | WEIGHT: 282 LBS

## 2023-05-16 DIAGNOSIS — R06.02 SOB (SHORTNESS OF BREATH): ICD-10-CM

## 2023-05-16 DIAGNOSIS — I10 PRIMARY HYPERTENSION: Chronic | ICD-10-CM

## 2023-05-16 DIAGNOSIS — E66.01 MORBID OBESITY (H): ICD-10-CM

## 2023-05-16 DIAGNOSIS — G60.9 IDIOPATHIC PERIPHERAL NEUROPATHY: ICD-10-CM

## 2023-05-16 DIAGNOSIS — Z00.00 ROUTINE PHYSICAL EXAMINATION: Primary | ICD-10-CM

## 2023-05-16 DIAGNOSIS — R06.2 WHEEZING: ICD-10-CM

## 2023-05-16 DIAGNOSIS — Z12.5 SCREENING FOR PROSTATE CANCER: ICD-10-CM

## 2023-05-16 DIAGNOSIS — R68.2 DRY MOUTH: ICD-10-CM

## 2023-05-16 LAB
ERYTHROCYTE [DISTWIDTH] IN BLOOD BY AUTOMATED COUNT: 12.8 % (ref 10–15)
HBA1C MFR BLD: 5.4 % (ref 0–5.6)
HCT VFR BLD AUTO: 43.7 % (ref 40–53)
HGB BLD-MCNC: 15.6 G/DL (ref 13.3–17.7)
MCH RBC QN AUTO: 30.1 PG (ref 26.5–33)
MCHC RBC AUTO-ENTMCNC: 35.7 G/DL (ref 31.5–36.5)
MCV RBC AUTO: 84 FL (ref 78–100)
PLATELET # BLD AUTO: 358 10E3/UL (ref 150–450)
RBC # BLD AUTO: 5.18 10E6/UL (ref 4.4–5.9)
WBC # BLD AUTO: 10.5 10E3/UL (ref 4–11)

## 2023-05-16 PROCEDURE — 86235 NUCLEAR ANTIGEN ANTIBODY: CPT | Performed by: INTERNAL MEDICINE

## 2023-05-16 PROCEDURE — G0103 PSA SCREENING: HCPCS | Performed by: INTERNAL MEDICINE

## 2023-05-16 PROCEDURE — 83036 HEMOGLOBIN GLYCOSYLATED A1C: CPT | Performed by: INTERNAL MEDICINE

## 2023-05-16 PROCEDURE — 83880 ASSAY OF NATRIURETIC PEPTIDE: CPT | Performed by: INTERNAL MEDICINE

## 2023-05-16 PROCEDURE — 86235 NUCLEAR ANTIGEN ANTIBODY: CPT | Mod: 59 | Performed by: INTERNAL MEDICINE

## 2023-05-16 PROCEDURE — 80048 BASIC METABOLIC PNL TOTAL CA: CPT | Performed by: INTERNAL MEDICINE

## 2023-05-16 PROCEDURE — 99396 PREV VISIT EST AGE 40-64: CPT | Performed by: INTERNAL MEDICINE

## 2023-05-16 PROCEDURE — 36415 COLL VENOUS BLD VENIPUNCTURE: CPT | Performed by: INTERNAL MEDICINE

## 2023-05-16 PROCEDURE — 85027 COMPLETE CBC AUTOMATED: CPT | Performed by: INTERNAL MEDICINE

## 2023-05-16 RX ORDER — ALBUTEROL SULFATE 90 UG/1
2 AEROSOL, METERED RESPIRATORY (INHALATION) EVERY 6 HOURS PRN
Qty: 18 G | Refills: 1 | Status: SHIPPED | OUTPATIENT
Start: 2023-05-16 | End: 2023-07-09

## 2023-05-16 ASSESSMENT — ENCOUNTER SYMPTOMS
FREQUENCY: 0
PARESTHESIAS: 0
NERVOUS/ANXIOUS: 0
ARTHRALGIAS: 1
MYALGIAS: 0
DYSURIA: 0
WEAKNESS: 0
HEADACHES: 0
HEARTBURN: 1
COUGH: 0
FEVER: 0
DIZZINESS: 0
EYE PAIN: 0
CONSTIPATION: 0
JOINT SWELLING: 0
HEMATURIA: 0
NAUSEA: 0
ABDOMINAL PAIN: 0
SORE THROAT: 0
CHILLS: 0
SHORTNESS OF BREATH: 0
DIARRHEA: 0
PALPITATIONS: 0
HEMATOCHEZIA: 0

## 2023-05-16 NOTE — PROGRESS NOTES
Wt Readings from Last 20 Encounters:   05/16/23 127.9 kg (282 lb)   03/14/22 114.3 kg (252 lb)   12/10/21 115.7 kg (255 lb)   01/20/21 125.6 kg (276 lb 14.4 oz)   01/04/21 125.6 kg (277 lb)   10/15/20 125.9 kg (277 lb 9 oz)   10/02/20 126.2 kg (278 lb 4.8 oz)   09/28/20 125.2 kg (276 lb)   06/29/20 128 kg (282 lb 1.6 oz)   11/21/19 127.9 kg (282 lb)   09/13/19 125.1 kg (275 lb 12.8 oz)   06/07/19 119.3 kg (263 lb)   05/22/19 123.4 kg (272 lb)   05/10/19 124.6 kg (274 lb 11.2 oz)   04/26/19 123.4 kg (272 lb)   03/15/19 124.3 kg (274 lb)   02/21/19 124.9 kg (275 lb 4.8 oz)   01/11/19 122.7 kg (270 lb 8 oz)   12/27/18 124.3 kg (274 lb)   11/20/18 126.1 kg (278 lb)     BP Readings from Last 20 Encounters:   05/16/23 136/80   03/14/22 112/70   12/10/21 110/68   10/16/20 (!) 158/94   10/02/20 108/80   06/29/20 122/78   09/13/19 128/80   05/07/04 140/76   04/12/04 138/82   08/18/03 132/90      Pulse Readings from Last 20 Encounters:   05/16/23 90   03/14/22 69   12/10/21 83   10/16/20 82   10/02/20 92   06/29/20 80   09/13/19 88   05/07/04 84   04/12/04 84   08/18/03 62     SpO2 Readings from Last 20 Encounters:   05/16/23 99%   03/14/22 96%   12/10/21 97%   10/16/20 98%   10/02/20 97%   09/13/19 97%

## 2023-05-17 PROBLEM — M17.0 PRIMARY OSTEOARTHRITIS OF BOTH KNEES: Status: ACTIVE | Noted: 2019-12-23

## 2023-05-17 PROBLEM — E66.01 MORBID OBESITY (H): Status: ACTIVE | Noted: 2023-05-17

## 2023-05-17 LAB
ANION GAP SERPL CALCULATED.3IONS-SCNC: 15 MMOL/L (ref 7–15)
BUN SERPL-MCNC: 12.4 MG/DL (ref 6–20)
CALCIUM SERPL-MCNC: 9.7 MG/DL (ref 8.6–10)
CHLORIDE SERPL-SCNC: 99 MMOL/L (ref 98–107)
CREAT SERPL-MCNC: 1.23 MG/DL (ref 0.67–1.17)
DEPRECATED HCO3 PLAS-SCNC: 25 MMOL/L (ref 22–29)
GFR SERPL CREATININE-BSD FRML MDRD: 71 ML/MIN/1.73M2
GLUCOSE SERPL-MCNC: 83 MG/DL (ref 70–99)
NT-PROBNP SERPL-MCNC: 11 PG/ML (ref 0–900)
POTASSIUM SERPL-SCNC: 3.8 MMOL/L (ref 3.4–5.3)
PSA SERPL DL<=0.01 NG/ML-MCNC: 0.44 NG/ML (ref 0–3.5)
SODIUM SERPL-SCNC: 139 MMOL/L (ref 136–145)

## 2023-05-17 NOTE — PATIENT INSTRUCTIONS
Future Appointments   Date Time Provider Department Center   7/17/2023 10:50 AM Tyrone Wood MD [facility]

## 2023-05-17 NOTE — PROGRESS NOTES
[facility] - Primary Care Specialists    Comprehensive and complex medical care - Chronic disease management - Shared decision making - Care coordination - Compassionate care    Patient advocacy - Rational deprescribing - Minimally disruptive medicine - Ethical focus - Customized care         Date of Service: 5/16/2023  Primary Provider: Jose Miguel Frias    Patient Care Team:  Jose Miguel Frias MD as PCP - General (Internal Medicine)  Tyrese Chang MD as MD (Orthopedics)  Romina Hartman APRN CNP as Nurse Practitioner (Nurse Practitioner - Gerontology)  Jose Miguel Frias MD as Assigned PCP          Patient's Pharmacy:    [facility] #[id number] - [place], [place] - [address]  [address]  [facility] [address]  Phone: [phone] Fax: [phone]     Patient's Contacts:  Name Home Phone Work Phone Mobile Phone Relationship Lgl GrMARISSA Taylor [phone] Spouse      Patient's Insurance:    Payor: BLUE PLUS / Plan: BLUE PLUS ADVANTAGE [place] / Product Type: HMO /           ROUTINE PHYSICAL    Active Problem List:  Problem List as of 5/16/2023 Reviewed: 12/23/2019  5:51 PM by Hannah Dobbins APRN CNP       High    Nonsmoker       Medium    HTN (hypertension)    Idiopathic peripheral neuropathy       Low    Allergic rhinitis    External hemorrhoids    Normal Colonoscopy - 2019    Thyroid nodule       Other    Post-traumatic osteoarthritis of both knees       Other    Gastroesophageal reflux disease with esophagitis without hemorrhage        Past Medical History:   Diagnosis Date     Allergic rhinitis 04/14/2010     Allergic rhinitis due to pollen      Dermatitis due to metals      External hemorrhoids      Family history of myocardial infarction     Brother younger     GERD (gastroesophageal reflux disease) 04/14/2010     History of anesthesia complications     slow to wake up, anxiety and paranoia     History of  esophagogastroduodenoscopy (EGD) 02/12/2019    GERD with Esophagitis     History of transfusion     at age 12     Hypertension      Nonsmoker      Testicular cyst      Thyroid nodule 10/02/2020    Partial thyroidectomy     Past Surgical History:   Procedure Laterality Date     HEMORRHOIDECTOMY EXTERNAL N/A 05/23/2019    Jam Mccoy MD     INJECT STEROID (LOCATION) Left 10/15/2020    Procedure: INJECTION, STEROID LEFT KNEE;  Surgeon: Tyrese Chang MD;  Location: [facility] OR     LEG SURGERY Right     Right leg surgery     NASAL SEPTUM SURGERY       OPTICAL TRACKING SYSTEM ARTHROPLASTY KNEE Right 10/15/2020    Procedure: Right total knee arthroplasty;  Surgeon: Tyrese Chang MD;  Location: [facility] OR     ORIF ANKLE DISLOCATION  01/01/1983     THYROIDECTOMY, PARTIAL       Social History     Occupational History     Occupation: Tech systems   Tobacco Use     Smoking status: Never     Smokeless tobacco: Never   Vaping Use     Vaping status: Not on file   Substance and Sexual Activity     Alcohol use: No     Drug use: No     Sexual activity: Not Currently      Social History     Social History Narrative    .        Currently drives RV's cross country at times.      Family History   Problem Relation Age of Onset     Unknown/Adopted Mother      Hypertension Father      Heart Disease Father      Snoring Father      Unknown/Adopted Sister      Unknown/Adopted Sister      Heart Failure Brother      Cancer - colorectal Maternal Grandmother      Cancer Paternal Grandmother         skin     Asthma Paternal Grandmother      Sleep Apnea Maternal Uncle      Family history is otherwise noncontributory.    Current Outpatient Medications   Medication Instructions     albuterol (PROAIR HFA/PROVENTIL HFA/VENTOLIN HFA) 108 (90 Base) MCG/ACT inhaler 2 puffs, Inhalation, EVERY 6 HOURS PRN     aspirin 162 mg, Oral, DAILY     losartan (COZAAR) 50 mg, Oral, DAILY     Multiple Vitamins-Minerals (MULTIVITAMIN ADULT PO) Oral      omeprazole (PRILOSEC) 40 mg, Oral, DAILY     terbinafine (LAMISIL) 1 % external cream APPLY TOPICALLY BETWEEN ALL TOES AT BEDTIME FOR 1 MONTH. APPLY SOCKS BEFORE GOING TO BEDTIME. AVOID SOCKS OR SHOES THAT MAKE TOO FOOT MOIST     triamterene-HCTZ (MAXZIDE) 75-50 MG tablet 1 tablet, Oral, DAILY     Vitamin C 500 mg, Oral, EVERY MORNING      Allergies: Nickel     Immunization History   Administered Date(s) Administered     DT (PEDS <7y) 09/16/1999     Flu, Unspecified 10/22/2015     Hepatitis A (ADULT 19+) 02/09/2009, 08/21/2009     Hepatitis B, Adult 06/21/2005, 09/02/2005, 03/21/2006, 07/01/2013     Influenza Vaccine, 6+MO IM (QUADRIVALENT W/PRESERVATIVES) 10/22/2015     TD,PF 7+ (Tenivac) 02/09/2009     TDAP (Adacel,Boostrix) 08/26/2015             Aydin Aldridge is a 52 year old male coming in today for:    Chief Complaint   Patient presents with     Physical          5/16/2023     2:43 PM   Additional Questions   Roomed by Kota MENENDEZ CMA     He does not have other issues outside the physical to discuss as well.    A lot of the acute issues were reviewed in the last virtual visit.    Reviewed his dry mouth and does have some dry eyes as well.    Reviewed the peripheral neuropathy (PN) and the numbness in his toes.    Blood pressure doing well.    Has some arthritis in the proximal interphalangeal (PIP) joint of his ring finger.    Does get occasional a bit shortness of breath.    Would like to check on hemorrhoid he had in the past.  Did have surgery for this.    We reviewed his other issues noted in the assessment but not specifically addressed in the HPI above.     Exam:     Wt Readings from Last 3 Encounters:   05/16/23 127.9 kg (282 lb)   03/14/22 114.3 kg (252 lb)   12/10/21 115.7 kg (255 lb)     BP Readings from Last 3 Encounters:   05/16/23 136/80   03/14/22 112/70   12/10/21 110/68     /80 (BP Location: Right arm, Patient Position: Sitting, Cuff Size: Adult Large)   Pulse 90   Temp 98.4  F (36.9  " C) (Oral)   Resp 18   Ht 1.892 m (6' 2.5\")   Wt 127.9 kg (282 lb)   SpO2 99%   BMI 35.72 kg/m     The patient is in no acute distress.  Mood good.  Insight good.  No skin lesions or nodules of concern.  Ears are clear.  Nose is clear.  Throat is clear.  Neck is supple  and there is no thyromegaly. No cervical, epitrochlear or axillary adenopathy.  Heart regular rate and rhythm.  No murmur present.  Lungs clear to auscultation bilaterally.  Respiratory effort is good.  Abdomen is soft, nontender.  No hepatosplenomegaly.  Extremities show no edema.   exam shows normal testes, normal penis.  Prostate is 1+, smooth, nontender. Some hemorrhoidal tissue still present.       Diagnostics:     Office Visit on 03/14/2022   Component Date Value Ref Range Status     Vitamin B12 03/14/2022 1,118 (H)  213 - 816 pg/mL Final     Erythrocyte Sedimentation Rate 03/14/2022 6  0 - 15 mm/hr Final     RAFI interpretation 03/14/2022 Negative  Negative Final      Negative:              <1:40  Borderline Positive:   1:40 - 1:80  Positive:              >1:80     TSH 03/14/2022 3.15  0.30 - 5.00 uIU/mL Final     Hemoglobin A1C 03/14/2022 5.3  0.0 - 5.6 % Final    Normal <5.7%   Prediabetes 5.7-6.4%    Diabetes 6.5% or higher     Note: Adopted from ADA consensus guidelines.     Total Protein Serum for ELP 03/14/2022 7.5  6.0 - 8.0 g/dL Final     Albumin % 03/14/2022 65.2  51.0 - 67.0 % Final     Albumin 03/14/2022 4.9 (H)  3.2 - 4.7 g/dL Final     Alpha 1 % 03/14/2022 2.1  2.0 - 4.0 % Final     Alpha 1 03/14/2022 0.2  0.1 - 0.3 g/dL Final     Alpha 2 % 03/14/2022 8.4  5.0 - 13.0 % Final     Alpha 2 03/14/2022 0.6  0.4 - 0.9 g/dL Final     Beta % 03/14/2022 11.4  10.0 - 17.0 % Final     Beta Globulin 03/14/2022 0.9  0.7 - 1.2 g/dL Final     Gamma Globulin % 03/14/2022 12.9  9.0 - 20.0 % Final     Gamma Globulin 03/14/2022 1.0  0.6 - 1.4 g/dL Final     ELP Interpretation 03/14/2022 The albumin fraction is slightly to moderately elevated. " This finding is sometimes associated with hemoconcentration or protein hypometabolism, but is of doubtful clinical significance in asymptomatic patients. The electrophoresis is otherwise normal.   Final     Path ICD: 03/14/2022 D47.2   Final     Reviewing Pathologist 03/14/2022 Jose Miguel Masters MD   Final     Total Protein Serum for ELP 03/14/2022 7.5  g/dL Final        Results for orders placed or performed in visit on 05/16/23   N terminal pro BNP outpatient     Status: Normal   Result Value Ref Range    N Terminal Pro BNP Outpatient 11 0 - 900 pg/mL   Basic metabolic panel     Status: Abnormal   Result Value Ref Range    Sodium 139 136 - 145 mmol/L    Potassium 3.8 3.4 - 5.3 mmol/L    Chloride 99 98 - 107 mmol/L    Carbon Dioxide (CO2) 25 22 - 29 mmol/L    Anion Gap 15 7 - 15 mmol/L    Urea Nitrogen 12.4 6.0 - 20.0 mg/dL    Creatinine 1.23 (H) 0.67 - 1.17 mg/dL    Calcium 9.7 8.6 - 10.0 mg/dL    Glucose 83 70 - 99 mg/dL    GFR Estimate 71 >60 mL/min/1.73m2   Hemoglobin A1c     Status: Normal   Result Value Ref Range    Hemoglobin A1C 5.4 0.0 - 5.6 %   CBC with platelets     Status: Normal   Result Value Ref Range    WBC Count 10.5 4.0 - 11.0 10e3/uL    RBC Count 5.18 4.40 - 5.90 10e6/uL    Hemoglobin 15.6 13.3 - 17.7 g/dL    Hematocrit 43.7 40.0 - 53.0 %    MCV 84 78 - 100 fL    MCH 30.1 26.5 - 33.0 pg    MCHC 35.7 31.5 - 36.5 g/dL    RDW 12.8 10.0 - 15.0 %    Platelet Count 358 150 - 450 10e3/uL   Prostate Specific Antigen Screen     Status: Normal   Result Value Ref Range    Prostate Specific Antigen Screen 0.44 0.00 - 3.50 ng/mL    Narrative    This result is obtained using the Roche Elecsys total PSA method on the maliha e801 immunoassay analyzer. Results obtained with different assay methods or kits cannot be used interchangeably.        Assessment:     1. Routine physical examination    2. Wheezing    3. SOB (shortness of breath)    4. Dry mouth    5. Screening for prostate cancer    6. Morbid obesity (H)    Complicated by hypertension.  Work on weight loss.   7. Primary hypertension    8. Idiopathic peripheral neuropathy        Plan:     1. Check blood work today.  2. Could see neurology for peripheral neuropathy (PN) in the future if desired.  Work-up so far okay.  3. Continue current medications otherwise.  4. Follow up sooner if issues.    Orders Placed This Encounter   Procedures     N terminal pro BNP outpatient     Basic metabolic panel     Hemoglobin A1c     CBC with platelets     SSA Ro RAFY Antibody IgG     SSB La RAFY Antibody IgG     Prostate Specific Antigen Screen           Jose Miguel Frias MD  General Internal Medicine  [facility]    Return in about 1 year (around 5/16/2024) for physical.     Future Appointments   Date Time Provider Department Center   7/17/2023 10:50 AM Tyrone Wood MD [facility]

## 2023-05-18 LAB
ENA SS-A AB SER IA-ACNC: <0.5 U/ML
ENA SS-A AB SER IA-ACNC: NEGATIVE
ENA SS-B IGG SER IA-ACNC: <0.6 U/ML
ENA SS-B IGG SER IA-ACNC: NEGATIVE

## 2023-07-08 DIAGNOSIS — R06.2 WHEEZING: ICD-10-CM

## 2023-07-09 RX ORDER — ALBUTEROL SULFATE 90 UG/1
AEROSOL, METERED RESPIRATORY (INHALATION)
Qty: 18 G | Refills: 1 | Status: SHIPPED | OUTPATIENT
Start: 2023-07-09 | End: 2024-06-20

## 2023-07-09 NOTE — TELEPHONE ENCOUNTER
"Routing refill request to provider for review/approval because:  Provider review requested for new medication    Last Written Prescription Date:  5/16/2023  Last Fill Quantity: 18 g,  # refills: 1   Last office visit provider:  5/16/2023     Requested Prescriptions   Pending Prescriptions Disp Refills     VENTOLIN  (90 Base) MCG/ACT inhaler [Pharmacy Med Name: VENTOLIN HFA INH W/DOS CTR [id number]] 18 g 1     Sig: INHALE 2 PUFFS INTO THE LUNGS EVERY 6 HOURS AS NEEDED FOR SHORTNESS OF BREATH OR WHEEZING OR COUGH       Asthma Maintenance Inhalers - Anticholinergics Passed - 7/8/2023  8:57 PM        Passed - Patient is age 12 years or older        Passed - Recent (12 mo) or future (30 days) visit within the authorizing provider's specialty     Patient has had an office visit with the authorizing provider or a provider within the authorizing providers department within the previous 12 mos or has a future within next 30 days. See \"Patient Info\" tab in inbasket, or \"Choose Columns\" in Meds & Orders section of the refill encounter.              Passed - Medication is active on med list       Short-Acting Beta Agonist Inhalers Protocol  Passed - 7/8/2023  8:57 PM        Passed - Patient is age 12 or older        Passed - Recent (12 mo) or future (30 days) visit within the authorizing provider's specialty     Patient has had an office visit with the authorizing provider or a provider within the authorizing providers department within the previous 12 mos or has a future within next 30 days. See \"Patient Info\" tab in inbasket, or \"Choose Columns\" in Meds & Orders section of the refill encounter.              Passed - Medication is active on med list             Stacy Gupta RN 07/09/23 10:27 AM  "

## 2023-07-13 ENCOUNTER — TELEPHONE (OUTPATIENT)
Dept: INTERNAL MEDICINE | Facility: CLINIC | Age: 53
End: 2023-07-13
Payer: COMMERCIAL

## 2023-07-18 NOTE — TELEPHONE ENCOUNTER
PA Initiation    Medication: OMEPRAZOLE 40 MG PO CPDR  Insurance Company: [employer] - Phone [phone] Fax [phone]  Pharmacy Filling the Rx: [id number] [place], [place] - [facility] AT [facility] [address]  Filling Pharmacy Phone: [phone]  Filling Pharmacy Fax:    Start Date: 7/18/2023

## 2023-07-19 NOTE — TELEPHONE ENCOUNTER
PRIOR AUTHORIZATION DENIED    Medication: OMEPRAZOLE 40 MG PO CPDR  Insurance Company: [employer] - Phone [phone] Fax [phone]  Denial Date: 7/19/2023  Denial Rational: Not covered more than 120 days for diagnosis.         Appeal Information:   Patient Notified: No

## 2023-07-22 ENCOUNTER — TELEPHONE (OUTPATIENT)
Dept: INTERNAL MEDICINE | Facility: CLINIC | Age: 53
End: 2023-07-22
Payer: COMMERCIAL

## 2023-07-22 DIAGNOSIS — K21.00 GASTROESOPHAGEAL REFLUX DISEASE WITH ESOPHAGITIS WITHOUT HEMORRHAGE: ICD-10-CM

## 2023-07-22 DIAGNOSIS — K22.10 EROSIVE ESOPHAGITIS: Primary | ICD-10-CM

## 2023-07-22 RX ORDER — OMEPRAZOLE 40 MG/1
40 CAPSULE, DELAYED RELEASE ORAL DAILY
Qty: 90 CAPSULE | Refills: 3 | Status: SHIPPED | OUTPATIENT
Start: 2023-07-22 | End: 2023-07-26

## 2023-07-22 NOTE — TELEPHONE ENCOUNTER
Patient Requesting Callback    Reason for call:  Pt states there are issues with omeprazole (PRILOSEC) & pt's insurance. States that regardless of insurance company they only approve a 120 day prescription in a calendar year. Pt says that he needs documentation sent to insurance & pharmacy stating that he needs to be on this medication 24/7/365. Says he is tired of having to prove that he needs this at a different refill timeline than they will approve. States he is almost out of this medication & Miguelobinna will not refill until they hear from Dr. Frias & pt insurance. Pt says he will run out in less than one week. Also states he cannot take the OTC medication, must have 40 mg dose. Please coordinate with Amy on [address] in [place] & pt insurance [employer] on file.     Information relayed to patient:  Clinic will reach out to pt during business hours next week.    Patient has additional questions:  Yes    What are your questions/concerns:  See above    Who does the patient want to speak with:  Dr. Frias or someone from his team that can assist    Is an  needed?:  No      Could we send this information to you in [facility] or would you prefer to receive a phone call?:   Patient would prefer a phone call   Okay to leave a detailed message?: Yes at Cell number on file:    Telephone Information:   Mobile [phone]

## 2023-07-23 NOTE — TELEPHONE ENCOUNTER
First this is an insurance issue.  His plan has become more restrictive.    Second, we have submitted a prior authorization (PA) and this was denied by the insurance.    I can try resubmitting the prescription under a different diagnosis which might be covered by the insurance, but I am not sure this will work.    I suggest he call his insurance directly and complain.  They will not change their behavior unless real people complain.  It is always easy for them to decline the physician and the pharmacy's requests.  They have already done so for us.    Jose Miguel Frias MD  General Internal Medicine  [facility]  7/22/2023, 8:28 PM

## 2023-07-24 NOTE — TELEPHONE ENCOUNTER
Done.    Jose Miguel Frias MD  General Internal Medicine  [facility]  7/24/2023, 12:43 PM

## 2023-07-24 NOTE — TELEPHONE ENCOUNTER
Patient calling to state what the insurance told him 5 to 10 min ago.    - This may not reflect the most recent diagnosis that was just sent in at 12:43 but the patient is giving us what the insurance said last he spoke to them.    Insurance will be sending over a inform appeal for omeprazole    They need a adequate diagnosis   -form will give the options for approval for the 365 days

## 2023-07-25 DIAGNOSIS — I10 PRIMARY HYPERTENSION: Chronic | ICD-10-CM

## 2023-07-25 RX ORDER — TRIAMTERENE AND HYDROCHLOROTHIAZIDE 75; 50 MG/1; MG/1
1 TABLET ORAL DAILY
Qty: 90 TABLET | Refills: 3 | OUTPATIENT
Start: 2023-07-25

## 2023-07-25 RX ORDER — LOSARTAN POTASSIUM 50 MG/1
TABLET ORAL
Qty: 90 TABLET | Refills: 3 | OUTPATIENT
Start: 2023-07-25

## 2023-07-26 RX ORDER — OMEPRAZOLE 40 MG/1
40 CAPSULE, DELAYED RELEASE ORAL DAILY
Qty: 90 CAPSULE | Refills: 3 | Status: SHIPPED | OUTPATIENT
Start: 2023-07-26 | End: 2024-08-15

## 2023-07-26 NOTE — TELEPHONE ENCOUNTER
Patient calling to state that he spoke to the pharmacy and they haven't received anything     Patient is requesting it be sent in again

## 2023-07-27 NOTE — TELEPHONE ENCOUNTER
Done.    Jose Miguel Frias MD  General Internal Medicine  [facility]  7/26/2023, 8:41 PM

## 2023-08-02 NOTE — TELEPHONE ENCOUNTER
Mauro from [employer] calling to state the never received the appeal letter that was getting faxed with the updated diagnosis     He stated he thinks the fax that was given earlier was wrong     Note from 7/22 states this has been sent but it looks like it has been going somewhere where its not being seen     Can the appeal and the diagnosis be sent to   fax: [phone]  Phone: [phone]

## 2023-08-03 NOTE — TELEPHONE ENCOUNTER
I am not on site to see what may be on Dr Frias's desk  Can someone help clarify what is needed?  Thanks    Merlin Kearney MD on 8/3/2023 at 8:50 AM

## 2023-08-07 RX ORDER — OMEPRAZOLE 40 MG/1
40 CAPSULE, DELAYED RELEASE ORAL DAILY
Qty: 90 CAPSULE | Refills: 0 | Status: SHIPPED | OUTPATIENT
Start: 2023-08-07 | End: 2024-06-20

## 2023-08-08 NOTE — TELEPHONE ENCOUNTER
I resubmitted the prescription under a diagnosis EROSIVE ESOPHAGITIS that our prior authorization (PA) people said the insurance would cover.    If his insurance is still refusing to cover, there is nothing more that I can do with this.  I will not be doing a letter or form as they are creating unnecessary work when they said they would cover the medication for this indication.    The diagnosis is a correct one based on his previous upper endoscopy (EGD).    I can refer him to [facility] if needed to get the documentation needed for his insurance to cover the medication.    Sometimes if it comes from a specialist, they will listen.    Jose Miguel Frias MD  General Internal Medicine  [facility]  8/7/2023, 10:24 PM

## 2023-08-08 NOTE — TELEPHONE ENCOUNTER
Spoke to patient and relayed below message. He states he will call and check if resubmitted prescription was received and will let us know.

## 2023-08-09 NOTE — TELEPHONE ENCOUNTER
"Patient calling in with Vianney from his insurance company on the line with him to try to \"figure out\" what is going on with his medication and where any disconnects may be as he states Vianney has still not received any documentation or appeals letters from DR Frias and team.  Writer relays message below from Dr Frias again to ensure patient is clearly understanding the situation from Dr Frias perspective.  Patient states clear understanding at which point Vianney steps in to conversation stating that she believes there is some level of miscommunication between insurance company and Dr Frias.  Yes, Prior Auth is needed for medication to be covered at this time as patient has met his max quantity of medication allowed by the insurance company for the given diagnosis.  Vianney goes on to say that a PA is still needed but it is for a \"quantity limit override\" due to patient meeting max allowable amount under the current diagnosis.  Anny states additionally that the only diagnosis that would be covered for a full year of medication is Barretts Esophagitis.  She has faxed exception form to writer, which was received during duration of call, and states that form must be completed and supporting documentation for diagnosis attached before any additional medication will be covered.  Patient asking that form be completed and records be attached asap as he has been out of medication for \"too long\" at this point.  Fax number to send form back to is on the form itself per Vianney.    Patient requesting telephone call to update when form has been completed and faxed to Vianney.    Writer will pass form to Dionicio (clinical RN on site) once this encounter is completed as he has been working with patient and Dr Frias to clear the situation thus far.  "

## 2023-08-10 RX ORDER — GABAPENTIN 300 MG/1
1 CAPSULE ORAL AT BEDTIME
COMMUNITY

## 2023-08-10 RX ORDER — ACETAMINOPHEN 325 MG/1
650 TABLET ORAL EVERY 4 HOURS PRN
COMMUNITY

## 2023-08-10 NOTE — TELEPHONE ENCOUNTER
"Contacted patient and reviewed his dx and requirements outlined in note from 8/9 where  was on call.  He does not have Barretts esophagus but rather erosive esophagus.  Patient is frustrated and feels this has been caused and worsened by lack of treatment with PPI.  Writer asked if patient had picked up Rx that was sent to [facility] to be paid for out of pocket for $3.36 for 90 days.  Patient has not yet gotten, though he says they have been texting him with Rx to .  Patient insists form is completed and faxed despite writer informing that without Lunsford esophagus dx which he does not have.  Patient states, \"that is fine, if they deny it I am going to contact a  to sergio them.\"  Writer to fax form and encouraged patient to get Rx at [facility].   "

## 2023-08-15 NOTE — TELEPHONE ENCOUNTER
General Call    Contacts         Type Contact Phone/Fax    07/13/2023 02:38 PM CDT Fax (Incoming) [id number] [address] AT [facility] [address] (Pharmacy) [phone]    08/02/2023 02:37 PM CDT Phone (Incoming) [facility] (Pharmacy) [phone]    08/15/2023 12:33 PM CDT Phone (Incoming) [facility] (Pharmacy) [phone]    08/15/2023 12:33 PM CDT Phone (Incoming) [facility] (Self) [phone] (M)          Reason for Call:  Appeal    What are your questions or concerns:  Per patient was expecting update from [employer] on appeal letter request for Omeprazole.     Date of last appointment with provider: 05/16/2023    Could we send this information to you in [facility] or would you prefer to receive a phone call?:   Patient would prefer a phone call   Okay to leave a detailed message?: Yes at Cell number on file:    Telephone Information:   Mobile [phone]       Cimzia Counseling:  I discussed with the patient the risks of Cimzia including but not limited to immunosuppression, allergic reactions and infections.  The patient understands that monitoring is required including a PPD at baseline and must alert us or the primary physician if symptoms of infection or other concerning signs are noted.

## 2024-02-01 NOTE — ADDENDUM NOTE
Addended by: GUMARO FORBES on: 8/7/2023 01:48 PM     Modules accepted: Orders     defer to ED attending Defer to hospitalist

## 2024-04-16 ENCOUNTER — PATIENT OUTREACH (OUTPATIENT)
Dept: CARE COORDINATION | Facility: CLINIC | Age: 54
End: 2024-04-16
Payer: COMMERCIAL

## 2024-04-30 ENCOUNTER — PATIENT OUTREACH (OUTPATIENT)
Dept: CARE COORDINATION | Facility: CLINIC | Age: 54
End: 2024-04-30
Payer: COMMERCIAL

## 2024-06-18 ENCOUNTER — TELEPHONE (OUTPATIENT)
Dept: INTERNAL MEDICINE | Facility: CLINIC | Age: 54
End: 2024-06-18
Payer: COMMERCIAL

## 2024-06-18 DIAGNOSIS — Z13.220 LIPID SCREENING: ICD-10-CM

## 2024-06-18 DIAGNOSIS — R06.2 WHEEZING: ICD-10-CM

## 2024-06-18 DIAGNOSIS — K21.00 GASTROESOPHAGEAL REFLUX DISEASE WITH ESOPHAGITIS WITHOUT HEMORRHAGE: ICD-10-CM

## 2024-06-18 DIAGNOSIS — I10 PRIMARY HYPERTENSION: Primary | ICD-10-CM

## 2024-06-18 DIAGNOSIS — K22.10 EROSIVE ESOPHAGITIS: ICD-10-CM

## 2024-06-18 NOTE — TELEPHONE ENCOUNTER
Reason for call:  Other   Patient called regarding (reason for call): call back  Additional comments: patient is set up for his physical on 7/19/24 but will need meds refilled before that. Also asking if you can enter lab orders and he will do this a few days before appointment  **adding a testosterone lab    Call to advise when done    Phone number to reach patient:  Home number on file [phone] (home)    Best Time:  any    Can we leave a detailed message on this number?  YES    Travel screening: Not Applicable

## 2024-06-19 NOTE — TELEPHONE ENCOUNTER
Spoke with patient regarding labs. He understood and is ok to do labs twice if need be. He would like just the basic normal items ordered prior for fasting purpose. He understood regarding Testerone and will discuss this at the appointment.  Please advise on lab ordering

## 2024-06-19 NOTE — TELEPHONE ENCOUNTER
Can call in bridge of medications he needs.    This was not communicated in the message.    Please be aware if he does blood work before the visit there is a chance he will need blood work at the visit as well if he has complaints or concerns.  Also, I can order what I think is necessary but he may have preferences what he would like to have done.  For example, I would likely run a basic metabolic panel and cholesterol with his blood work.    Also I cannot order a testosterone without an appropriate history so that it is covered by his insurance.    Jose Miguel Frias MD  General Internal Medicine  [facility]  6/18/2024, 10:03 PM

## 2024-06-20 RX ORDER — LOSARTAN POTASSIUM 50 MG/1
50 TABLET ORAL DAILY
Qty: 30 TABLET | Refills: 0 | Status: SHIPPED | OUTPATIENT
Start: 2024-06-20 | End: 2024-07-25

## 2024-06-20 RX ORDER — ALBUTEROL SULFATE 90 UG/1
2 AEROSOL, METERED RESPIRATORY (INHALATION) EVERY 6 HOURS PRN
Qty: 18 G | Refills: 0 | Status: SHIPPED | OUTPATIENT
Start: 2024-06-20 | End: 2024-08-15

## 2024-06-20 RX ORDER — OMEPRAZOLE 40 MG/1
40 CAPSULE, DELAYED RELEASE ORAL DAILY
Qty: 30 CAPSULE | Refills: 0 | Status: SHIPPED | OUTPATIENT
Start: 2024-06-20 | End: 2024-07-25

## 2024-06-20 RX ORDER — TRIAMTERENE AND HYDROCHLOROTHIAZIDE 75; 50 MG/1; MG/1
1 TABLET ORAL DAILY
Qty: 30 TABLET | Refills: 0 | Status: SHIPPED | OUTPATIENT
Start: 2024-06-20 | End: 2024-07-25

## 2024-06-20 NOTE — TELEPHONE ENCOUNTER
"  Patient Returning Call    Reason for call:  Returning Call to Clinic    Information relayed to patient:  Relayed message to patient. Per patient when asked which medications \"all of them\".  Patient declined offer to schedule lab appointment.     Asked specifically which medication - restates all of them.  Patient then begins to share frustration trying to get Omeprazole and now has scarring on esophagus as it was such a hassle to get approval from insurance for medication. Per patient, last picked up RX in April. Relayed to patient RX was for one year. Patient expresses concern he will run out. Patient shares also needs his inhaler, losartan and the other one.    Additional attempt made to schedule lab, patient shares that his schedule is very complicated and he will have to call on days he has availability. Shared with patient that our lab is now open on Saturday if that will help - declined to schedule.     omeprazole (PRILOSEC) 40 MG DR capsule   VENTOLIN  (90 Base) MCG/ACT inhaler   triamterene-HCTZ (MAXZIDE) 75-50 MG tablet   losartan (COZAAR) 50 MG tablet     Next Visit with PCP = 7/19/24    Medication meron'd up for 30 days if appropriate for approval.     Patient has additional questions:  No    "

## 2024-06-20 NOTE — TELEPHONE ENCOUNTER
Left message for patient to call back. When patient calls back please relay message below and meron up medications need refilled.

## 2024-06-20 NOTE — TELEPHONE ENCOUNTER
Ordered labs.    Also need to know which medications need a bridge.  This was not addressed/set up.    Jose Miguel Frias MD  General Internal Medicine  [facility]  6/19/2024, 7:58 PM

## 2024-06-22 ENCOUNTER — HEALTH MAINTENANCE LETTER (OUTPATIENT)
Age: 54
End: 2024-06-22

## 2024-07-25 DIAGNOSIS — K22.10 EROSIVE ESOPHAGITIS: ICD-10-CM

## 2024-07-25 DIAGNOSIS — K21.00 GASTROESOPHAGEAL REFLUX DISEASE WITH ESOPHAGITIS WITHOUT HEMORRHAGE: ICD-10-CM

## 2024-07-25 DIAGNOSIS — I10 PRIMARY HYPERTENSION: ICD-10-CM

## 2024-07-25 RX ORDER — TRIAMTERENE AND HYDROCHLOROTHIAZIDE 75; 50 MG/1; MG/1
1 TABLET ORAL DAILY
Qty: 30 TABLET | Refills: 0 | Status: SHIPPED | OUTPATIENT
Start: 2024-07-25 | End: 2024-08-15

## 2024-07-25 RX ORDER — LOSARTAN POTASSIUM 50 MG/1
50 TABLET ORAL DAILY
Qty: 30 TABLET | Refills: 0 | Status: SHIPPED | OUTPATIENT
Start: 2024-07-25 | End: 2024-08-15

## 2024-07-25 RX ORDER — OMEPRAZOLE 40 MG/1
40 CAPSULE, DELAYED RELEASE ORAL DAILY
Qty: 30 CAPSULE | Refills: 0 | Status: SHIPPED | OUTPATIENT
Start: 2024-07-25

## 2024-07-25 NOTE — TELEPHONE ENCOUNTER
Pharmacy calling to get a medication refill on medication(s) attached     Disp Refills Start End MARIA C  omeprazole (PRILOSEC) 40 MG DR capsule 30 capsule 0 6/20/2024 -- No  Sig - Route: Take 1 capsule (40 mg) by mouth daily - Oral  Sent to pharmacy as: Omeprazole 40 MG Oral Capsule Delayed Release (PriLOSEC)  Class: E-Prescribe  Order: [id number]  -------------------------------------------------------------  triamterene-HCTZ (MAXZIDE) 75-50 MG tablet 30 tablet 0 6/20/2024 -- No  Sig - Route: Take 1 tablet by mouth daily - Oral  Sent to pharmacy as: Triamterene-HCTZ 75-50 MG Oral Tablet (MAXZIDE)  Class: E-Prescribe  Order: [id number]  -------------------------------------------------------------   Disp Refills Start End MARIA C  losartan (COZAAR) 50 MG tablet 30 tablet 0 6/20/2024 -- No  Sig - Route: Take 1 tablet (50 mg) by mouth daily - Oral  Sent to pharmacy as: Losartan Potassium 50 MG Oral Tablet (COZAAR)  Class: E-Prescribe  Order: [id number]

## 2024-07-29 DIAGNOSIS — I10 PRIMARY HYPERTENSION: ICD-10-CM

## 2024-07-29 DIAGNOSIS — K22.10 EROSIVE ESOPHAGITIS: ICD-10-CM

## 2024-07-29 RX ORDER — LOSARTAN POTASSIUM 50 MG/1
50 TABLET ORAL DAILY
Qty: 30 TABLET | Refills: 0 | OUTPATIENT
Start: 2024-07-29

## 2024-07-29 RX ORDER — OMEPRAZOLE 40 MG/1
40 CAPSULE, DELAYED RELEASE ORAL DAILY
Qty: 90 CAPSULE | Refills: 3 | OUTPATIENT
Start: 2024-07-29

## 2024-07-29 RX ORDER — TRIAMTERENE AND HYDROCHLOROTHIAZIDE 75; 50 MG/1; MG/1
1 TABLET ORAL DAILY
Qty: 30 TABLET | Refills: 0 | OUTPATIENT
Start: 2024-07-29

## 2024-07-29 NOTE — TELEPHONE ENCOUNTER
Declined as this is an automatic response from [facility] and patient is overdue to be seen.    Jose Miguel Frias MD  General Internal Medicine  [facility]  7/29/2024, 1:22 PM

## 2024-07-29 NOTE — TELEPHONE ENCOUNTER
The patient is request authorization to dispense a 90-day supply for Triamterene/hydrochlorothiazide, Losartan, and Omeprazole    Please advise.

## 2024-08-14 ENCOUNTER — LAB (OUTPATIENT)
Dept: LAB | Facility: CLINIC | Age: 54
End: 2024-08-14
Payer: COMMERCIAL

## 2024-08-14 DIAGNOSIS — Z13.220 LIPID SCREENING: ICD-10-CM

## 2024-08-14 DIAGNOSIS — I10 PRIMARY HYPERTENSION: ICD-10-CM

## 2024-08-14 LAB
ANION GAP SERPL CALCULATED.3IONS-SCNC: 13 MMOL/L (ref 7–15)
BUN SERPL-MCNC: 11.5 MG/DL (ref 6–20)
CALCIUM SERPL-MCNC: 9 MG/DL (ref 8.8–10.4)
CHLORIDE SERPL-SCNC: 96 MMOL/L (ref 98–107)
CHOLEST SERPL-MCNC: 172 MG/DL
CREAT SERPL-MCNC: 1.1 MG/DL (ref 0.67–1.17)
EGFRCR SERPLBLD CKD-EPI 2021: 81 ML/MIN/1.73M2
FASTING STATUS PATIENT QL REPORTED: YES
FASTING STATUS PATIENT QL REPORTED: YES
GLUCOSE SERPL-MCNC: 127 MG/DL (ref 70–99)
HCO3 SERPL-SCNC: 26 MMOL/L (ref 22–29)
HDLC SERPL-MCNC: 37 MG/DL
LDLC SERPL CALC-MCNC: 106 MG/DL
NONHDLC SERPL-MCNC: 135 MG/DL
POTASSIUM SERPL-SCNC: 3 MMOL/L (ref 3.4–5.3)
SODIUM SERPL-SCNC: 135 MMOL/L (ref 135–145)
TRIGL SERPL-MCNC: 143 MG/DL

## 2024-08-14 PROCEDURE — 80048 BASIC METABOLIC PNL TOTAL CA: CPT

## 2024-08-14 PROCEDURE — 80061 LIPID PANEL: CPT

## 2024-08-14 PROCEDURE — 36415 COLL VENOUS BLD VENIPUNCTURE: CPT

## 2024-08-14 SDOH — HEALTH STABILITY: PHYSICAL HEALTH: ON AVERAGE, HOW MANY MINUTES DO YOU ENGAGE IN EXERCISE AT THIS LEVEL?: 10 MIN

## 2024-08-14 SDOH — HEALTH STABILITY: PHYSICAL HEALTH: ON AVERAGE, HOW MANY DAYS PER WEEK DO YOU ENGAGE IN MODERATE TO STRENUOUS EXERCISE (LIKE A BRISK WALK)?: 2 DAYS

## 2024-08-14 ASSESSMENT — SOCIAL DETERMINANTS OF HEALTH (SDOH): HOW OFTEN DO YOU GET TOGETHER WITH FRIENDS OR RELATIVES?: ONCE A WEEK

## 2024-08-15 ENCOUNTER — TELEPHONE (OUTPATIENT)
Dept: INTERNAL MEDICINE | Facility: CLINIC | Age: 54
End: 2024-08-15

## 2024-08-15 ENCOUNTER — OFFICE VISIT (OUTPATIENT)
Dept: INTERNAL MEDICINE | Facility: CLINIC | Age: 54
End: 2024-08-15
Payer: COMMERCIAL

## 2024-08-15 VITALS
WEIGHT: 280.3 LBS | RESPIRATION RATE: 20 BRPM | HEIGHT: 75 IN | DIASTOLIC BLOOD PRESSURE: 84 MMHG | BODY MASS INDEX: 34.85 KG/M2 | HEART RATE: 125 BPM | TEMPERATURE: 98.5 F | SYSTOLIC BLOOD PRESSURE: 155 MMHG | OXYGEN SATURATION: 96 %

## 2024-08-15 DIAGNOSIS — M54.2 SORE NECK: ICD-10-CM

## 2024-08-15 DIAGNOSIS — R14.0 ABDOMINAL BLOATING: ICD-10-CM

## 2024-08-15 DIAGNOSIS — Z00.00 ROUTINE PHYSICAL EXAMINATION: Primary | ICD-10-CM

## 2024-08-15 DIAGNOSIS — K21.00 GASTROESOPHAGEAL REFLUX DISEASE WITH ESOPHAGITIS WITHOUT HEMORRHAGE: ICD-10-CM

## 2024-08-15 DIAGNOSIS — R06.2 WHEEZING: ICD-10-CM

## 2024-08-15 DIAGNOSIS — I10 PRIMARY HYPERTENSION: ICD-10-CM

## 2024-08-15 DIAGNOSIS — R19.7 DIARRHEA OF PRESUMED INFECTIOUS ORIGIN: ICD-10-CM

## 2024-08-15 DIAGNOSIS — E66.01 MORBID OBESITY (H): ICD-10-CM

## 2024-08-15 DIAGNOSIS — L50.3 DERMATOGRAPHIC URTICARIA: ICD-10-CM

## 2024-08-15 DIAGNOSIS — R53.83 LOW ENERGY: ICD-10-CM

## 2024-08-15 DIAGNOSIS — R73.09 OTHER ABNORMAL GLUCOSE: ICD-10-CM

## 2024-08-15 DIAGNOSIS — K22.10 EROSIVE ESOPHAGITIS: ICD-10-CM

## 2024-08-15 DIAGNOSIS — E87.6 HYPOKALEMIA: ICD-10-CM

## 2024-08-15 LAB
ALBUMIN SERPL BCG-MCNC: 4.6 G/DL (ref 3.5–5.2)
ALP SERPL-CCNC: 123 U/L (ref 40–150)
ALT SERPL W P-5'-P-CCNC: 114 U/L (ref 0–70)
ANION GAP SERPL CALCULATED.3IONS-SCNC: 14 MMOL/L (ref 7–15)
AST SERPL W P-5'-P-CCNC: 60 U/L (ref 0–45)
BASOPHILS # BLD AUTO: 0 10E3/UL (ref 0–0.2)
BASOPHILS NFR BLD AUTO: 0 %
BILIRUB SERPL-MCNC: 0.6 MG/DL
BUN SERPL-MCNC: 10.5 MG/DL (ref 6–20)
CALCIUM SERPL-MCNC: 9.7 MG/DL (ref 8.8–10.4)
CHLORIDE SERPL-SCNC: 94 MMOL/L (ref 98–107)
CREAT SERPL-MCNC: 1.17 MG/DL (ref 0.67–1.17)
CRP SERPL-MCNC: 105 MG/L
EGFRCR SERPLBLD CKD-EPI 2021: 75 ML/MIN/1.73M2
EOSINOPHIL # BLD AUTO: 0.1 10E3/UL (ref 0–0.7)
EOSINOPHIL NFR BLD AUTO: 0 %
ERYTHROCYTE [DISTWIDTH] IN BLOOD BY AUTOMATED COUNT: 12.9 % (ref 10–15)
GLUCOSE SERPL-MCNC: 115 MG/DL (ref 70–99)
HBA1C MFR BLD: 5.5 % (ref 0–5.6)
HCO3 SERPL-SCNC: 26 MMOL/L (ref 22–29)
HCT VFR BLD AUTO: 41.9 % (ref 40–53)
HGB BLD-MCNC: 14.8 G/DL (ref 13.3–17.7)
IMM GRANULOCYTES # BLD: 0 10E3/UL
IMM GRANULOCYTES NFR BLD: 0 %
LYMPHOCYTES # BLD AUTO: 1.3 10E3/UL (ref 0.8–5.3)
LYMPHOCYTES NFR BLD AUTO: 9 %
MAGNESIUM SERPL-MCNC: 2.2 MG/DL (ref 1.7–2.3)
MCH RBC QN AUTO: 30.3 PG (ref 26.5–33)
MCHC RBC AUTO-ENTMCNC: 35.3 G/DL (ref 31.5–36.5)
MCV RBC AUTO: 86 FL (ref 78–100)
MONOCYTES # BLD AUTO: 1.4 10E3/UL (ref 0–1.3)
MONOCYTES NFR BLD AUTO: 9 %
NEUTROPHILS # BLD AUTO: 12.5 10E3/UL (ref 1.6–8.3)
NEUTROPHILS NFR BLD AUTO: 81 %
PLATELET # BLD AUTO: 304 10E3/UL (ref 150–450)
POTASSIUM SERPL-SCNC: 2.9 MMOL/L (ref 3.4–5.3)
PROT SERPL-MCNC: 7.8 G/DL (ref 6.4–8.3)
RBC # BLD AUTO: 4.88 10E6/UL (ref 4.4–5.9)
SHBG SERPL-SCNC: 12 NMOL/L (ref 11–80)
SODIUM SERPL-SCNC: 134 MMOL/L (ref 135–145)
WBC # BLD AUTO: 15.4 10E3/UL (ref 4–11)

## 2024-08-15 PROCEDURE — 99396 PREV VISIT EST AGE 40-64: CPT | Mod: 25 | Performed by: INTERNAL MEDICINE

## 2024-08-15 PROCEDURE — 36415 COLL VENOUS BLD VENIPUNCTURE: CPT | Performed by: INTERNAL MEDICINE

## 2024-08-15 PROCEDURE — 84270 ASSAY OF SEX HORMONE GLOBUL: CPT | Performed by: INTERNAL MEDICINE

## 2024-08-15 PROCEDURE — 80053 COMPREHEN METABOLIC PANEL: CPT | Performed by: INTERNAL MEDICINE

## 2024-08-15 PROCEDURE — 83036 HEMOGLOBIN GLYCOSYLATED A1C: CPT | Performed by: INTERNAL MEDICINE

## 2024-08-15 PROCEDURE — 85025 COMPLETE CBC W/AUTO DIFF WBC: CPT | Performed by: INTERNAL MEDICINE

## 2024-08-15 PROCEDURE — 86140 C-REACTIVE PROTEIN: CPT | Performed by: INTERNAL MEDICINE

## 2024-08-15 PROCEDURE — 99213 OFFICE O/P EST LOW 20 MIN: CPT | Mod: 25 | Performed by: INTERNAL MEDICINE

## 2024-08-15 PROCEDURE — 84403 ASSAY OF TOTAL TESTOSTERONE: CPT | Performed by: INTERNAL MEDICINE

## 2024-08-15 PROCEDURE — 86364 TISS TRNSGLTMNASE EA IG CLAS: CPT | Performed by: INTERNAL MEDICINE

## 2024-08-15 PROCEDURE — 83735 ASSAY OF MAGNESIUM: CPT | Performed by: INTERNAL MEDICINE

## 2024-08-15 RX ORDER — TRIAMTERENE AND HYDROCHLOROTHIAZIDE 75; 50 MG/1; MG/1
1 TABLET ORAL DAILY
Qty: 90 TABLET | Refills: 3 | Status: SHIPPED | OUTPATIENT
Start: 2024-08-15

## 2024-08-15 RX ORDER — OMEPRAZOLE 40 MG/1
40 CAPSULE, DELAYED RELEASE ORAL DAILY
Qty: 90 CAPSULE | Refills: 3 | Status: SHIPPED | OUTPATIENT
Start: 2024-08-15

## 2024-08-15 RX ORDER — LOSARTAN POTASSIUM 50 MG/1
50 TABLET ORAL DAILY
Qty: 90 TABLET | Refills: 3 | Status: SHIPPED | OUTPATIENT
Start: 2024-08-15 | End: 2025-08-10

## 2024-08-15 RX ORDER — ALBUTEROL SULFATE 90 UG/1
2 AEROSOL, METERED RESPIRATORY (INHALATION) EVERY 6 HOURS PRN
Qty: 18 G | Refills: 3 | Status: SHIPPED | OUTPATIENT
Start: 2024-08-15

## 2024-08-15 ASSESSMENT — PAIN SCALES - GENERAL: PAINLEVEL: MILD PAIN (2)

## 2024-08-15 NOTE — PROGRESS NOTES
[facility]    Comprehensive and complex medical care - Chronic disease management - Shared decision making - Care coordination - Compassionate care    Patient advocacy - Rational deprescribing - Minimally disruptive medicine - Ethical focus - Customized care         Date of Service: 8/15/2024  Primary Provider: Jose Miguel Frias    Patient Care Team:  Jose Miguel Frias MD as PCP - General (Internal Medicine)  Tyrese Chang MD as MD (Orthopedics)  Romina Hartman APRN CNP as Nurse Practitioner (Nurse Practitioner - Gerontology)  Jose Miguel Frias MD as Assigned PCP          Patient's Pharmacy:    [id number] - [place], [place] - [address]  [address]  [facility] [address]  Phone: [phone] Fax: [phone]    -[place], [place] - [place], [place] - [address]  [address] [address]  Phone: [phone] Fax: [phone]     Patient's Contacts:  Name Home Phone Work Phone Mobile Phone Relationship Lgl MARISSA Garrison [phone] Spouse      Patient's Insurance:    Payor: BLUE PLUS / Plan: BLUE PLUS ADVANTAGE [place] / Product Type: HMO /            ROUTINE PHYSICAL    Active Problem List:  Problem List as of 8/15/2024 Reviewed: 8/15/2024 10:54 PM by Jose Miguel Frias MD         High    Nonsmoker       Medium    HTN (hypertension)    Primary osteoarthritis of both knees    Gastroesophageal reflux disease with esophagitis without hemorrhage    Idiopathic peripheral neuropathy       Low    Allergic rhinitis    External hemorrhoids    Normal Colonoscopy - 2019    Thyroid nodule    Morbid obesity (H)        Past Medical History:   Diagnosis Date    Allergic rhinitis 04/14/2010    Allergic rhinitis due to pollen     Dermatitis due to metals     External hemorrhoids     Family history of myocardial infarction     Brother younger    GERD (gastroesophageal  reflux disease) 04/14/2010    History of anesthesia complications     slow to wake up, anxiety and paranoia    History of esophagogastroduodenoscopy (EGD) 02/12/2019    GERD with Esophagitis    History of transfusion     at age 12    Hypertension     Nonsmoker     Testicular cyst     Thyroid nodule 10/02/2020    Partial thyroidectomy     Past Surgical History:   Procedure Laterality Date    HEMORRHOIDECTOMY EXTERNAL N/A 05/23/2019    Jam Mccoy MD    INJECT STEROID (LOCATION) Left 10/15/2020    Procedure: INJECTION, STEROID LEFT KNEE;  Surgeon: Tyrese Chang MD;  Location: [facility] OR    LEG SURGERY Right     Right leg surgery    NASAL SEPTUM SURGERY      OPTICAL TRACKING SYSTEM ARTHROPLASTY KNEE Right 10/15/2020    Procedure: Right total knee arthroplasty;  Surgeon: Tyrese Chang MD;  Location: [facility] OR    ORIF ANKLE DISLOCATION  01/01/1983    THYROIDECTOMY, PARTIAL       Social History     Occupational History    Occupation: Tech systems   Tobacco Use    Smoking status: Never    Smokeless tobacco: Never   Substance and Sexual Activity    Alcohol use: No    Drug use: No    Sexual activity: Not Currently      Social History     Social History Narrative    .        Currently drives RV's cross country at times.      Family History   Problem Relation Age of Onset    Unknown/Adopted Mother     Hypertension Father     Heart Disease Father     Snoring Father     Unknown/Adopted Sister     Unknown/Adopted Sister     Heart Failure Brother     Cancer - colorectal Maternal Grandmother     Cancer Paternal Grandmother         skin    Asthma Paternal Grandmother     Sleep Apnea Maternal Uncle      Family history is otherwise noncontributory.    Current Outpatient Medications   Medication Instructions    acetaminophen (TYLENOL) 650 mg, Oral, EVERY 4 HOURS PRN    albuterol (VENTOLIN HFA) 108 (90 Base) MCG/ACT inhaler 2 puffs, Inhalation, EVERY 6 HOURS PRN    aspirin 162 mg, Oral, DAILY    gabapentin (NEURONTIN) 300 MG  "capsule 1 capsule, Oral, AT BEDTIME    losartan (COZAAR) 50 mg, Oral, DAILY    Multiple Vitamins-Minerals (MULTIVITAMIN ADULT PO) Oral    omeprazole (PRILOSEC) 40 mg, Oral, DAILY    omeprazole (PRILOSEC) 40 mg, Oral, DAILY    terbinafine (LAMISIL) 1 % external cream APPLY TOPICALLY BETWEEN ALL TOES AT BEDTIME FOR 1 MONTH. APPLY SOCKS BEFORE GOING TO BEDTIME. AVOID SOCKS OR SHOES THAT MAKE TOO FOOT MOIST    triamterene-HCTZ (MAXZIDE) 75-50 MG tablet 1 tablet, Oral, DAILY    Vitamin C 500 mg, Oral, EVERY MORNING      Allergies: Nickel     Immunization History   Administered Date(s) Administered    DT (PEDS <7y) 09/16/1999    Flu, Unspecified 10/22/2015    Hepatitis A (ADULT 19+) 02/09/2009, 08/21/2009    Hepatitis B, Adult 06/21/2005, 09/02/2005, 03/21/2006, 07/01/2013    Influenza Vaccine, 6+MO IM (QUADRIVALENT W/PRESERVATIVES) 10/22/2015    TD,PF 7+ (Tenivac) 02/09/2009    TDAP (Adacel,Boostrix) 08/26/2015             Aydin Aldridge is a 53 year old male coming in today for:    Chief Complaint   Patient presents with    Physical    Follow Up     Pt would like to discuss some things that he has been dealing with over the last year; pt states that he got a \"cold\" three times in a row.     Testosterone      Pt was wondering about his testo levels, as he tends to have low energy and crash throughout the day; Pt states that he takes at minimum 2-3 naps a week.     Bloating      Pt states that he has felt a good amount of bloating throughout the week - pt has only had fluids today.     Diarrhea     PT states that the last three days he was experiencing loose bowel movements.     Neck Pain     Pt states that he has been dealing with a sore neck area for the last 4-5 days. Pt states that soreness is on the right side.           8/15/2024    12:11 PM   Additional Questions   Roomed by SHARIFA Lopez   Accompanied by Self     He does  have other issues outside the physical to discuss as well.    Also having issues with " "diarrhea and abdomen bloating.  This has been in the last 3 days.  Bowel movements more than 5x a day and watery.  Has had bouts of diarrhea in the past but usually do not last more than a day ans are usually precipitated by constipation ahead of this.  This has not gotten better.  Does not abdomen bloating but not really pain associated with this.  No nausea or vomiting.  Has felt chilled.  Bloating is worse postprandial.  No blood in the stool.     During this time, has noted increased gastroesophageal reflux disease (GERD) and took 3 tums last night for this.    During this time has also noted some neck soreness as well.  No radiculopathy noted with this.     Does feel warm at times as well.    Blood pressure high today and this was reviewed.  Potassium was recently low and this may be due to the diarrhea.    Does get some welts with itching or scratching and this has been for a long time.    We reviewed his other issues noted in the assessment but not specifically addressed in the HPI above.     Exam:     Wt Readings from Last 3 Encounters:   08/15/24 127.1 kg (280 lb 4.8 oz)   05/16/23 127.9 kg (282 lb)   03/14/22 114.3 kg (252 lb)     BP Readings from Last 3 Encounters:   08/15/24 (!) 155/84   05/16/23 136/80   03/14/22 112/70     BP (!) 155/84 (BP Location: Right arm, Patient Position: Sitting, Cuff Size: Adult Large)   Pulse (!) 125   Temp 98.5  F (36.9  C) (Oral)   Resp 20   Ht 1.892 m (6' 2.5\")   Wt 127.1 kg (280 lb 4.8 oz)   SpO2 96%   BMI 35.51 kg/m     The patient is in mild acute distress.  Mood good.  Insight good.  No skin lesions or nodules of concern. Has urticaria of the right chest.   Ears are clear.  Nose is clear.  Throat is clear.  Neck is supple  and there is no thyromegaly. No cervical, epitrochlear or axillary adenopathy.  Heart regular rate and rhythm.  No murmur present.  Lungs clear to auscultation bilaterally.  Respiratory effort is good.  Abdomen is soft, nontender.  No " hepatosplenomegaly.  The abdomen is bloated.  Extremities show no edema.         Diagnostics:     Lab on 08/14/2024   Component Date Value Ref Range Status    Cholesterol 08/14/2024 172  <200 mg/dL Final    Triglycerides 08/14/2024 143  <150 mg/dL Final    Direct Measure HDL 08/14/2024 37 (L)  >=40 mg/dL Final    LDL Cholesterol Calculated 08/14/2024 106 (H)  <=100 mg/dL Final    Non HDL Cholesterol 08/14/2024 135 (H)  <130 mg/dL Final    Patient Fasting > 8hrs? 08/14/2024 Yes   Final    Sodium 08/14/2024 135  135 - 145 mmol/L Final    Potassium 08/14/2024 3.0 (L)  3.4 - 5.3 mmol/L Final    Chloride 08/14/2024 96 (L)  98 - 107 mmol/L Final    Carbon Dioxide (CO2) 08/14/2024 26  22 - 29 mmol/L Final    Anion Gap 08/14/2024 13  7 - 15 mmol/L Final    Urea Nitrogen 08/14/2024 11.5  6.0 - 20.0 mg/dL Final    Creatinine 08/14/2024 1.10  0.67 - 1.17 mg/dL Final    GFR Estimate 08/14/2024 81  >60 mL/min/1.73m2 Final    eGFR calculated using 2021 CKD-EPI equation.    Calcium 08/14/2024 9.0  8.8 - 10.4 mg/dL Final    Reference intervals for this test were updated on 7/16/2024 to reflect our healthy population more accurately. There may be differences in the flagging of prior results with similar values performed with this method. Those prior results can be interpreted in the context of the updated reference intervals.    Glucose 08/14/2024 127 (H)  70 - 99 mg/dL Final    Patient Fasting > 8hrs? 08/14/2024 Yes   Final        Results for orders placed or performed in visit on 08/15/24   Comprehensive metabolic panel     Status: Abnormal   Result Value Ref Range    Sodium 134 (L) 135 - 145 mmol/L    Potassium 2.9 (L) 3.4 - 5.3 mmol/L    Carbon Dioxide (CO2) 26 22 - 29 mmol/L    Anion Gap 14 7 - 15 mmol/L    Urea Nitrogen 10.5 6.0 - 20.0 mg/dL    Creatinine 1.17 0.67 - 1.17 mg/dL    GFR Estimate 75 >60 mL/min/1.73m2    Calcium 9.7 8.8 - 10.4 mg/dL    Chloride 94 (L) 98 - 107 mmol/L    Glucose 115 (H) 70 - 99 mg/dL    Alkaline  Phosphatase 123 40 - 150 U/L    AST 60 (H) 0 - 45 U/L     (H) 0 - 70 U/L    Protein Total 7.8 6.4 - 8.3 g/dL    Albumin 4.6 3.5 - 5.2 g/dL    Bilirubin Total 0.6 <=1.2 mg/dL   Magnesium     Status: Normal   Result Value Ref Range    Magnesium 2.2 1.7 - 2.3 mg/dL   CRP inflammation     Status: Abnormal   Result Value Ref Range    CRP Inflammation 105.00 (H) <5.00 mg/L   Hemoglobin A1c     Status: Normal   Result Value Ref Range    Hemoglobin A1C 5.5 0.0 - 5.6 %   CBC with platelets and differential     Status: Abnormal   Result Value Ref Range    WBC Count 15.4 (H) 4.0 - 11.0 10e3/uL    RBC Count 4.88 4.40 - 5.90 10e6/uL    Hemoglobin 14.8 13.3 - 17.7 g/dL    Hematocrit 41.9 40.0 - 53.0 %    MCV 86 78 - 100 fL    MCH 30.3 26.5 - 33.0 pg    MCHC 35.3 31.5 - 36.5 g/dL    RDW 12.9 10.0 - 15.0 %    Platelet Count 304 150 - 450 10e3/uL    % Neutrophils 81 %    % Lymphocytes 9 %    % Monocytes 9 %    % Eosinophils 0 %    % Basophils 0 %    % Immature Granulocytes 0 %    Absolute Neutrophils 12.5 (H) 1.6 - 8.3 10e3/uL    Absolute Lymphocytes 1.3 0.8 - 5.3 10e3/uL    Absolute Monocytes 1.4 (H) 0.0 - 1.3 10e3/uL    Absolute Eosinophils 0.1 0.0 - 0.7 10e3/uL    Absolute Basophils 0.0 0.0 - 0.2 10e3/uL    Absolute Immature Granulocytes 0.0 <=0.4 10e3/uL   Sex Hormone Binding Globulin     Status: Normal   Result Value Ref Range    Sex Hormone Binding Globulin 12 11 - 80 nmol/L   CBC with Platelets & Differential     Status: Abnormal    Narrative    The following orders were created for panel order CBC with Platelets & Differential.  Procedure                               Abnormality         Status                     ---------                               -----------         ------                     CBC with platelets and d...[[id number]]  Abnormal            Final result                 Please view results for these tests on the individual orders.   Testosterone Free and Total     Status: None (In process)     Narrative    The following orders were created for panel order Testosterone Free and Total.  Procedure                               Abnormality         Status                     ---------                               -----------         ------                     Sex Hormone Binding Glob...[[id number]]  Normal              Final result               Testosterone Free and Total[[id number]]                      In process                   Please view results for these tests on the individual orders.        Assessment:     1. Routine physical examination    2. Primary hypertension    3. Erosive esophagitis    4. Gastroesophageal reflux disease with esophagitis without hemorrhage    5. Wheezing    6. Low energy    7. Diarrhea of presumed infectious origin    8. Hypokalemia    9. Other abnormal glucose    10. Morbid obesity (H)   Complicated by hypertension - - work on weight loss.   11. Sore neck    12. Abdominal bloating        Plan:     Check lab work today.     May need to do further stool or CT scan if not improving.  Will likely need follow up in clinic as well.  Push fluids.  Pepto Bismol or Imodium (loperamide) for diarrhea.  Continue current medications otherwise.  Follow up sooner if issues.    Orders Placed This Encounter   Procedures    Tissue transglutaminase ashlie IgA and IgG    Comprehensive metabolic panel    Magnesium    CRP inflammation    Hemoglobin A1c    CBC with platelets and differential    Sex Hormone Binding Globulin    Testosterone Free and Total    CBC with Platelets & Differential    Testosterone Free and Total           Jose Miguel Frias MD  General Internal Medicine  [facility]    Return in about 1 year (around 8/15/2025) for physical.     No future appointments.

## 2024-08-16 PROBLEM — L50.3 DERMATOGRAPHIC URTICARIA: Status: ACTIVE | Noted: 2024-08-16

## 2024-08-16 LAB
TTG IGA SER-ACNC: 0.5 U/ML
TTG IGG SER-ACNC: <0.6 U/ML

## 2024-08-16 NOTE — TELEPHONE ENCOUNTER
Pt called clinic back, the pt was informed of yesterdays 8/15 lab results.    Pt states he believes his diarrhea is improving today, he would like to monitor them for now.    If symptoms worsen, he would like to go ahead with CT scan.    Pt will reach out if diarrhea symptoms worsen.    GAMAL Dial.

## 2024-08-16 NOTE — TELEPHONE ENCOUNTER
LMTCB for patient. If call is returned, please relay PCP message below OR transfer call to nurse line.     Thank you

## 2024-08-16 NOTE — TELEPHONE ENCOUNTER
Please call patient -    ______________________________________________________________________     Home phone:  [phone] (home)     Cell phone:   Telephone Information:   Mobile [phone]       Other contacts:  Name Home Phone Work Phone Mobile Phone Relationship Lgl GrMARISSA Taylor [phone] Spouse      ______________________________________________________________________     White blood cell count was elevated at 15K and red blood cell count normal.    A marker of inflammation called the C-reactive protein (CRP) was markedly elevated.    There is no signs of diabetes in your blood work.     Your kidney tests were normal. Potassium is still low.      Liver tests slightly elevated.  They have been normal in the past.    Cholesterol doing well.    Please see how he is doing in relationship to his diarrhea and abdomen discomfort.    I would recommend doing a CT scan of the abdomen on Friday if he is not improving.  Could be seen in [facility] if needed to be reassessed.    We could order a CT scan and he could call [phone] to get it scheduled.     We can determine follow up from there.    Jose Miguel Frias MD  [facility]  8/15/2024, 10:54 PM     ______________________________________________________________________    Recent Results (from the past 240 hour(s))   Lipid panel reflex to direct LDL Fasting    Collection Time: 08/14/24 10:40 AM   Result Value Ref Range    Cholesterol 172 <200 mg/dL    Triglycerides 143 <150 mg/dL    Direct Measure HDL 37 (L) >=40 mg/dL    LDL Cholesterol Calculated 106 (H) <=100 mg/dL    Non HDL Cholesterol 135 (H) <130 mg/dL    Patient Fasting > 8hrs? Yes    Basic metabolic panel    Collection Time: 08/14/24 10:40 AM   Result Value Ref Range    Sodium 135 135 - 145 mmol/L    Potassium 3.0 (L) 3.4 - 5.3 mmol/L    Chloride 96 (L) 98 - 107 mmol/L    Carbon Dioxide (CO2) 26 22 - 29 mmol/L    Anion Gap 13 7 - 15 mmol/L    Urea Nitrogen  11.5 6.0 - 20.0 mg/dL    Creatinine 1.10 0.67 - 1.17 mg/dL    GFR Estimate 81 >60 mL/min/1.73m2    Calcium 9.0 8.8 - 10.4 mg/dL    Glucose 127 (H) 70 - 99 mg/dL    Patient Fasting > 8hrs? Yes    Comprehensive metabolic panel    Collection Time: 08/15/24  1:25 PM   Result Value Ref Range    Sodium 134 (L) 135 - 145 mmol/L    Potassium 2.9 (L) 3.4 - 5.3 mmol/L    Carbon Dioxide (CO2) 26 22 - 29 mmol/L    Anion Gap 14 7 - 15 mmol/L    Urea Nitrogen 10.5 6.0 - 20.0 mg/dL    Creatinine 1.17 0.67 - 1.17 mg/dL    GFR Estimate 75 >60 mL/min/1.73m2    Calcium 9.7 8.8 - 10.4 mg/dL    Chloride 94 (L) 98 - 107 mmol/L    Glucose 115 (H) 70 - 99 mg/dL    Alkaline Phosphatase 123 40 - 150 U/L    AST 60 (H) 0 - 45 U/L     (H) 0 - 70 U/L    Protein Total 7.8 6.4 - 8.3 g/dL    Albumin 4.6 3.5 - 5.2 g/dL    Bilirubin Total 0.6 <=1.2 mg/dL   Magnesium    Collection Time: 08/15/24  1:25 PM   Result Value Ref Range    Magnesium 2.2 1.7 - 2.3 mg/dL   CRP inflammation    Collection Time: 08/15/24  1:25 PM   Result Value Ref Range    CRP Inflammation 105.00 (H) <5.00 mg/L   Hemoglobin A1c    Collection Time: 08/15/24  1:25 PM   Result Value Ref Range    Hemoglobin A1C 5.5 0.0 - 5.6 %   CBC with platelets and differential    Collection Time: 08/15/24  1:25 PM   Result Value Ref Range    WBC Count 15.4 (H) 4.0 - 11.0 10e3/uL    RBC Count 4.88 4.40 - 5.90 10e6/uL    Hemoglobin 14.8 13.3 - 17.7 g/dL    Hematocrit 41.9 40.0 - 53.0 %    MCV 86 78 - 100 fL    MCH 30.3 26.5 - 33.0 pg    MCHC 35.3 31.5 - 36.5 g/dL    RDW 12.9 10.0 - 15.0 %    Platelet Count 304 150 - 450 10e3/uL    % Neutrophils 81 %    % Lymphocytes 9 %    % Monocytes 9 %    % Eosinophils 0 %    % Basophils 0 %    % Immature Granulocytes 0 %    Absolute Neutrophils 12.5 (H) 1.6 - 8.3 10e3/uL    Absolute Lymphocytes 1.3 0.8 - 5.3 10e3/uL    Absolute Monocytes 1.4 (H) 0.0 - 1.3 10e3/uL    Absolute Eosinophils 0.1 0.0 - 0.7 10e3/uL    Absolute Basophils 0.0 0.0 - 0.2 10e3/uL     Absolute Immature Granulocytes 0.0 <=0.4 10e3/uL   Sex Hormone Binding Globulin    Collection Time: 08/15/24  1:25 PM   Result Value Ref Range    Sex Hormone Binding Globulin 12 11 - 80 nmol/L      ______________________________________________________________________

## 2024-08-16 NOTE — PATIENT INSTRUCTIONS
"Please follow up if you have any further issues.    You may contact me if you are worsening or if things are not improving.    ______________________________________________________________________     How do I get a hold of Dr. Frias or his team more directly?    One option is to leave me a [employer] message about your situation.  Another option is to call our Care Team coordinators.  These are 3 people in our [facility] taking phone calls.  They are available Monday - Friday 7 am - 6 pm.  You can call [phone] and when asked for a voice prompt, say \"CARE TEAM\" to get connected to them.  ______________________________________________________________________     What if I need an appointment with Dr. Frias specifically?    Please remember that you can call [phone] ANYTIME to schedule an appointment.  With the voice prompt, say \"APPOINTMENTS.\"    You can schedule appointments 24 hours a day, 7 days a week.      Sometimes the best time to schedule an appointment is after clinic hours or on weekends when less people are calling in.      You can also schedule appointments through [employer].    ______________________________________________________________________     What if I need care more urgently?    If it is an emergency, do not wait for us to respond as it can sometimes take awhile.  Call 911 and go to the hospital.  A number of more urgent care options are available:  Walk-in Care at [place] or [place].  Open in [place] 10 am - 8 pm Monday-Friday.  9 am - 8 pm Saturday and Sunday.  Open in [place] 10 am - 8 pm Monday-Friday.  9 am - 5 pm Saturday and Sunday.  Earlier times tend to be less crowded.  Other local urgent care.  Urgency Room (in [facility] and [place]).  For more information, [email or web address].  All locations are open from 8am to 9pm daily.  Emergency room especially in the middle of the night.  If your problem is more orthopedic in nature (joint, spine or bone pain), " "there is orthopedic urgent care as well.  [facility] urgent care.    Available locally in [facility], [place] and [place].  Open 8 am to 8 pm every day.  [facility] urgent care.  Available in [place], [place] and [place].  Open 8 am to 8 pm every day.    ______________________________________________________________________     What if I just want some advice from a nurse?    We do have triage nurses available 24-7.  Call [phone] and when asked for a voice prompt, say \"TRIAGE NURSE\"     "

## 2024-08-18 LAB
TESTOST FREE SERPL-MCNC: 3.12 NG/DL
TESTOST SERPL-MCNC: 107 NG/DL (ref 240–950)

## 2024-08-19 NOTE — TELEPHONE ENCOUNTER
Call patient back and see how he is doing.    Please let him know his testosterone level is also low.  We need to do follow up testing blood work on this and his potassium and go over this.    Could schedule a visit a reserved slot in the next 2-6 weeks to review this.    Jose Miguel Frias MD  General Internal Medicine  [facility]  8/19/2024, 7:34 AM

## 2024-08-19 NOTE — TELEPHONE ENCOUNTER
Contacted patient and reviewed PCP message below.  Patient reports using Pepto as advised and he is doing much better.      Scheduled him for follow up on 10-10-24 per patient preference.

## 2024-10-10 ENCOUNTER — OFFICE VISIT (OUTPATIENT)
Dept: INTERNAL MEDICINE | Facility: CLINIC | Age: 54
End: 2024-10-10
Payer: COMMERCIAL

## 2024-10-10 VITALS
DIASTOLIC BLOOD PRESSURE: 80 MMHG | RESPIRATION RATE: 18 BRPM | BODY MASS INDEX: 34.94 KG/M2 | TEMPERATURE: 98.5 F | HEART RATE: 98 BPM | SYSTOLIC BLOOD PRESSURE: 120 MMHG | WEIGHT: 281 LBS | OXYGEN SATURATION: 97 % | HEIGHT: 75 IN

## 2024-10-10 DIAGNOSIS — R79.89 LOW TESTOSTERONE: Primary | ICD-10-CM

## 2024-10-10 DIAGNOSIS — I10 PRIMARY HYPERTENSION: Chronic | ICD-10-CM

## 2024-10-10 DIAGNOSIS — N50.89 TESTICULAR SWELLING, RIGHT: ICD-10-CM

## 2024-10-10 DIAGNOSIS — M25.50 MULTIPLE JOINT PAIN: ICD-10-CM

## 2024-10-10 DIAGNOSIS — H93.11 TINNITUS, RIGHT: ICD-10-CM

## 2024-10-10 DIAGNOSIS — R79.89 ABNORMAL LFTS: ICD-10-CM

## 2024-10-10 DIAGNOSIS — F51.01 PRIMARY INSOMNIA: ICD-10-CM

## 2024-10-10 DIAGNOSIS — E04.1 THYROID NODULE: ICD-10-CM

## 2024-10-10 DIAGNOSIS — R10.31 DISCOMFORT OF RIGHT GROIN: ICD-10-CM

## 2024-10-10 DIAGNOSIS — N52.9 ERECTILE DYSFUNCTION, UNSPECIFIED ERECTILE DYSFUNCTION TYPE: ICD-10-CM

## 2024-10-10 LAB
ALBUMIN SERPL BCG-MCNC: 5 G/DL (ref 3.5–5.2)
ALP SERPL-CCNC: 84 U/L (ref 40–150)
ALT SERPL W P-5'-P-CCNC: 95 U/L (ref 0–70)
ANION GAP SERPL CALCULATED.3IONS-SCNC: 14 MMOL/L (ref 7–15)
AST SERPL W P-5'-P-CCNC: 43 U/L (ref 0–45)
BILIRUB SERPL-MCNC: 0.4 MG/DL
BUN SERPL-MCNC: 15.6 MG/DL (ref 6–20)
CALCIUM SERPL-MCNC: 9.6 MG/DL (ref 8.8–10.4)
CHLORIDE SERPL-SCNC: 99 MMOL/L (ref 98–107)
CREAT SERPL-MCNC: 1 MG/DL (ref 0.67–1.17)
CRP SERPL-MCNC: 5.57 MG/L
EGFRCR SERPLBLD CKD-EPI 2021: 90 ML/MIN/1.73M2
ERYTHROCYTE [DISTWIDTH] IN BLOOD BY AUTOMATED COUNT: 12.7 % (ref 10–15)
FSH SERPL IRP2-ACNC: 25.8 MIU/ML (ref 1.5–12.4)
GLUCOSE SERPL-MCNC: 104 MG/DL (ref 70–99)
HCO3 SERPL-SCNC: 26 MMOL/L (ref 22–29)
HCT VFR BLD AUTO: 44.7 % (ref 40–53)
HGB BLD-MCNC: 15.6 G/DL (ref 13.3–17.7)
IRON BINDING CAPACITY (ROCHE): 287 UG/DL (ref 240–430)
IRON SATN MFR SERPL: 21 % (ref 15–46)
IRON SERPL-MCNC: 59 UG/DL (ref 61–157)
LH SERPL-ACNC: 11.9 MIU/ML (ref 1.7–8.6)
MCH RBC QN AUTO: 30.4 PG (ref 26.5–33)
MCHC RBC AUTO-ENTMCNC: 34.9 G/DL (ref 31.5–36.5)
MCV RBC AUTO: 87 FL (ref 78–100)
PLATELET # BLD AUTO: 348 10E3/UL (ref 150–450)
POTASSIUM SERPL-SCNC: 3.6 MMOL/L (ref 3.4–5.3)
PROLACTIN SERPL 3RD IS-MCNC: 14 NG/ML (ref 4–15)
PROT SERPL-MCNC: 8.1 G/DL (ref 6.4–8.3)
RBC # BLD AUTO: 5.13 10E6/UL (ref 4.4–5.9)
SHBG SERPL-SCNC: 11 NMOL/L (ref 11–80)
SODIUM SERPL-SCNC: 139 MMOL/L (ref 135–145)
TSH SERPL DL<=0.005 MIU/L-ACNC: 1.47 UIU/ML (ref 0.3–4.2)
WBC # BLD AUTO: 9.9 10E3/UL (ref 4–11)

## 2024-10-10 PROCEDURE — 84270 ASSAY OF SEX HORMONE GLOBUL: CPT | Performed by: INTERNAL MEDICINE

## 2024-10-10 PROCEDURE — 84443 ASSAY THYROID STIM HORMONE: CPT | Performed by: INTERNAL MEDICINE

## 2024-10-10 PROCEDURE — 83001 ASSAY OF GONADOTROPIN (FSH): CPT | Performed by: INTERNAL MEDICINE

## 2024-10-10 PROCEDURE — 86140 C-REACTIVE PROTEIN: CPT | Performed by: INTERNAL MEDICINE

## 2024-10-10 PROCEDURE — 83540 ASSAY OF IRON: CPT | Performed by: INTERNAL MEDICINE

## 2024-10-10 PROCEDURE — 84403 ASSAY OF TOTAL TESTOSTERONE: CPT | Performed by: INTERNAL MEDICINE

## 2024-10-10 PROCEDURE — 80053 COMPREHEN METABOLIC PANEL: CPT | Performed by: INTERNAL MEDICINE

## 2024-10-10 PROCEDURE — 85027 COMPLETE CBC AUTOMATED: CPT | Performed by: INTERNAL MEDICINE

## 2024-10-10 PROCEDURE — 36415 COLL VENOUS BLD VENIPUNCTURE: CPT | Performed by: INTERNAL MEDICINE

## 2024-10-10 PROCEDURE — G2211 COMPLEX E/M VISIT ADD ON: HCPCS | Performed by: INTERNAL MEDICINE

## 2024-10-10 PROCEDURE — 83002 ASSAY OF GONADOTROPIN (LH): CPT | Performed by: INTERNAL MEDICINE

## 2024-10-10 PROCEDURE — 83550 IRON BINDING TEST: CPT | Performed by: INTERNAL MEDICINE

## 2024-10-10 PROCEDURE — 84146 ASSAY OF PROLACTIN: CPT | Performed by: INTERNAL MEDICINE

## 2024-10-10 PROCEDURE — 99214 OFFICE O/P EST MOD 30 MIN: CPT | Performed by: INTERNAL MEDICINE

## 2024-10-10 RX ORDER — CYCLOBENZAPRINE HCL 5 MG
5-10 TABLET ORAL
Qty: 60 TABLET | Refills: 3 | Status: SHIPPED | OUTPATIENT
Start: 2024-10-10

## 2024-10-10 NOTE — PATIENT INSTRUCTIONS
To schedule an appointment with a dermatologist, I recommend calling [facility] at [phone].    Dr. Prem Arroyo    They have multiple offices in the following locations:    [place] [id number], [address]  [place] [address]    [place]  [address]  [place] [address]    [place]  [address], [address]  [place]  [address]    [place]  [address], [address]  [place] [address]    Please have them send me copies of your reports from your visit.     ______________________________________________________________________    Call [facility] at [phone] to make your appointment.  For the thumb.  I recommend seeing Dr. Tyrese Kinsey or Dr. Trace Last

## 2024-10-10 NOTE — PROGRESS NOTES
[facility]    Comprehensive and complex medical care - Chronic disease management - Shared decision making - Care coordination - Compassionate care    Patient advocacy - Rational deprescribing - Minimally disruptive medicine - Ethical focus - Customized care         Date of Service: 10/10/2024  Primary Provider: Jose Miguel Frias    Patient Care Team:  Jose Miguel Frias MD as PCP - General (Internal Medicine)  Tyrese Chagn MD as MD (Orthopedics)  Romina Hartman APRN CNP as Nurse Practitioner (Nurse Practitioner - Gerontology)  Jose Miguel Frias MD as Assigned PCP          Patient's Pharmacy:    [id number] - [place], [place] - [address]  [address]  [facility] [address]  Phone: [phone] Fax: [phone]    -[place], [place] - [place], [place] - [address]  [address] [facility] [address]  Phone: [phone] Fax: [phone]     Patient's Contacts:  Name Home Phone Work Phone Mobile Phone Relationship Lgl MARISSA Garrison [phone] Spouse      Patient's Insurance:    Payor: BLUE PLUS / Plan: BLUE PLUS ADVANTAGE [place] / Product Type: HMO /           Active Problem List:  Problem List as of 10/10/2024 Reviewed: 8/15/2024 10:54 PM by Jose Miguel Frias MD         High    Nonsmoker       Medium    Primary hypertension    Primary osteoarthritis of both knees    Gastroesophageal reflux disease with esophagitis without hemorrhage    Idiopathic peripheral neuropathy    Dermatographic urticaria       Low    Allergic rhinitis    External hemorrhoids    Normal Colonoscopy - 2019    Thyroid nodule    Morbid obesity (H)        Current Outpatient Medications   Medication Instructions    acetaminophen (TYLENOL) 650 mg, Oral, EVERY 4 HOURS PRN    albuterol (VENTOLIN HFA) 108 (90 Base) MCG/ACT inhaler 2 puffs, Inhalation, EVERY 6 HOURS PRN    aspirin 162 mg, Oral,  DAILY    cyclobenzaprine (FLEXERIL) 5-10 mg, Oral, AT BEDTIME PRN    gabapentin (NEURONTIN) 300 MG capsule 1 capsule, Oral, AT BEDTIME    losartan (COZAAR) 50 mg, Oral, DAILY    Multiple Vitamins-Minerals (MULTIVITAMIN ADULT PO) Oral    omeprazole (PRILOSEC) 40 mg, Oral, DAILY    omeprazole (PRILOSEC) 40 mg, Oral, DAILY    terbinafine (LAMISIL) 1 % external cream APPLY TOPICALLY BETWEEN ALL TOES AT BEDTIME FOR 1 MONTH. APPLY SOCKS BEFORE GOING TO BEDTIME. AVOID SOCKS OR SHOES THAT MAKE TOO FOOT MOIST    triamterene-HCTZ (MAXZIDE) 75-50 MG tablet 1 tablet, Oral, DAILY    Vitamin C 500 mg, Oral, EVERY MORNING     Social History     Social History Narrative    .        Currently drives RV's cross country at times.       Subjective:     Aydin Aldridge is a 54 year old male who comes in today for:    Chief Complaint   Patient presents with    Follow Up     Follow up on lab           10/10/2024    12:29 PM   Additional Questions   Roomed by Dede Hinton   Accompanied by N/A     Lots of stuff at the visit to unwind.    At the last visit he was in for routine check and was sick with gastroenteritis and this resolved but his lab results were quite off.    Testosterone level off and does have issues with erectile dysfunction (ED).  Last level low and would like to confirm today.    Potassium was off and we would like to follow up on this as well.    Would like to see a different dermatologist.  Tareen too aggressive according to him.    Would like to go back to otolaryngology (ENT) as tinnitus has been worse in the right ear.    Issues with muscle and right knee pain.  Will likely follow up with orthopedics related to this.  Would like a refill of cyclobenzaprine (Flexeril) as has helped for pain and sleep.    Previously on other sleep medications through [facility].  Will request records from them.    Right testicle bothers at times and somewhat malformed.    Wants follow up on thyroid nodule.    We reviewed his other  "issues noted in the assessment but not specifically addressed in the HPI above.     Objective:     Wt Readings from Last 3 Encounters:   10/10/24 127.5 kg (281 lb)   08/15/24 127.1 kg (280 lb 4.8 oz)   05/16/23 127.9 kg (282 lb)     BP Readings from Last 3 Encounters:   10/10/24 120/80   08/15/24 (!) 155/84   05/16/23 136/80     /80   Pulse 98   Temp 98.5  F (36.9  C) (Oral)   Resp 18   Ht 1.892 m (6' 2.5\")   Wt 127.5 kg (281 lb)   SpO2 97%   BMI 35.60 kg/m     The patient is comfortable, no acute distress.  Mood good.  Insight good.  Eyes are nonicteric.  Neck is supple without mass.  No cervical adenopathy.  No thyromegaly. Heart regular rate and rhythm.  Lungs clear to auscultation bilaterally.  Respiratory effort is good.    Extremities no edema.  Testicles are somewhat smaller.  Right testicle has tow components present no signs of infection.      Diagnostics:     Office Visit on 08/15/2024   Component Date Value Ref Range Status    Tissue Transglutaminase Antibody I* 08/15/2024 0.5  <7.0 U/mL Final    Negative- The tTG-IgA assay has limited utility for patients with decreased levels of IgA. Screening for celiac disease should include IgA testing to rule out selective IgA deficiency and to guide selection and interpretation of serological testing. tTG-IgG testing may be positive in celiac disease patients with IgA deficiency.    Tissue Transglutaminase Antibody I* 08/15/2024 <0.6  <7.0 U/mL Final    Negative    Sodium 08/15/2024 134 (L)  135 - 145 mmol/L Final    Potassium 08/15/2024 2.9 (L)  3.4 - 5.3 mmol/L Final    Carbon Dioxide (CO2) 08/15/2024 26  22 - 29 mmol/L Final    Anion Gap 08/15/2024 14  7 - 15 mmol/L Final    Urea Nitrogen 08/15/2024 10.5  6.0 - 20.0 mg/dL Final    Creatinine 08/15/2024 1.17  0.67 - 1.17 mg/dL Final    GFR Estimate 08/15/2024 75  >60 mL/min/1.73m2 Final    eGFR calculated using 2021 CKD-EPI equation.    Calcium 08/15/2024 9.7  8.8 - 10.4 mg/dL Final    Reference " intervals for this test were updated on 7/16/2024 to reflect our healthy population more accurately. There may be differences in the flagging of prior results with similar values performed with this method. Those prior results can be interpreted in the context of the updated reference intervals.    Chloride 08/15/2024 94 (L)  98 - 107 mmol/L Final    Glucose 08/15/2024 115 (H)  70 - 99 mg/dL Final    Alkaline Phosphatase 08/15/2024 123  40 - 150 U/L Final    AST 08/15/2024 60 (H)  0 - 45 U/L Final    ALT 08/15/2024 114 (H)  0 - 70 U/L Final    Protein Total 08/15/2024 7.8  6.4 - 8.3 g/dL Final    Albumin 08/15/2024 4.6  3.5 - 5.2 g/dL Final    Bilirubin Total 08/15/2024 0.6  <=1.2 mg/dL Final    Magnesium 08/15/2024 2.2  1.7 - 2.3 mg/dL Final    CRP Inflammation 08/15/2024 105.00 (H)  <5.00 mg/L Final    Hemoglobin A1C 08/15/2024 5.5  0.0 - 5.6 % Final    Normal <5.7%   Prediabetes 5.7-6.4%    Diabetes 6.5% or higher     Note: Adopted from ADA consensus guidelines.    WBC Count 08/15/2024 15.4 (H)  4.0 - 11.0 10e3/uL Final    RBC Count 08/15/2024 4.88  4.40 - 5.90 10e6/uL Final    Hemoglobin 08/15/2024 14.8  13.3 - 17.7 g/dL Final    Hematocrit 08/15/2024 41.9  40.0 - 53.0 % Final    MCV 08/15/2024 86  78 - 100 fL Final    MCH 08/15/2024 30.3  26.5 - 33.0 pg Final    MCHC 08/15/2024 35.3  31.5 - 36.5 g/dL Final    RDW 08/15/2024 12.9  10.0 - 15.0 % Final    Platelet Count 08/15/2024 304  150 - 450 10e3/uL Final    % Neutrophils 08/15/2024 81  % Final    % Lymphocytes 08/15/2024 9  % Final    % Monocytes 08/15/2024 9  % Final    % Eosinophils 08/15/2024 0  % Final    % Basophils 08/15/2024 0  % Final    % Immature Granulocytes 08/15/2024 0  % Final    Absolute Neutrophils 08/15/2024 12.5 (H)  1.6 - 8.3 10e3/uL Final    Absolute Lymphocytes 08/15/2024 1.3  0.8 - 5.3 10e3/uL Final    Absolute Monocytes 08/15/2024 1.4 (H)  0.0 - 1.3 10e3/uL Final    Absolute Eosinophils 08/15/2024 0.1  0.0 - 0.7 10e3/uL Final     Absolute Basophils 08/15/2024 0.0  0.0 - 0.2 10e3/uL Final    Absolute Immature Granulocytes 08/15/2024 0.0  <=0.4 10e3/uL Final    Sex Hormone Binding Globulin 08/15/2024 12  11 - 80 nmol/L Final    Free Testosterone Calculated 08/15/2024 3.12  ng/dL Final    Male Dandy Ranges:  Dandy Stage I: Less than or equal to 0.37 ng/dL  Dandy Stage II: 0.03-2.1 ng/dL  Dandy Stage III: 0.10-9.8 ng/dL  Dandy Stage IV: 3.5-16.9 ng/dL  Dandy Stage V: 4.1-23.9 ng/dL    Testosterone Total 08/15/2024 107 (L)  240 - 950 ng/dL Final       Results for orders placed or performed in visit on 10/10/24   Comprehensive metabolic panel     Status: Abnormal   Result Value Ref Range    Sodium 139 135 - 145 mmol/L    Potassium 3.6 3.4 - 5.3 mmol/L    Carbon Dioxide (CO2) 26 22 - 29 mmol/L    Anion Gap 14 7 - 15 mmol/L    Urea Nitrogen 15.6 6.0 - 20.0 mg/dL    Creatinine 1.00 0.67 - 1.17 mg/dL    GFR Estimate 90 >60 mL/min/1.73m2    Calcium 9.6 8.8 - 10.4 mg/dL    Chloride 99 98 - 107 mmol/L    Glucose 104 (H) 70 - 99 mg/dL    Alkaline Phosphatase 84 40 - 150 U/L    AST 43 0 - 45 U/L    ALT 95 (H) 0 - 70 U/L    Protein Total 8.1 6.4 - 8.3 g/dL    Albumin 5.0 3.5 - 5.2 g/dL    Bilirubin Total 0.4 <=1.2 mg/dL   CRP inflammation     Status: Abnormal   Result Value Ref Range    CRP Inflammation 5.57 (H) <5.00 mg/L   CBC with platelets     Status: Normal   Result Value Ref Range    WBC Count 9.9 4.0 - 11.0 10e3/uL    RBC Count 5.13 4.40 - 5.90 10e6/uL    Hemoglobin 15.6 13.3 - 17.7 g/dL    Hematocrit 44.7 40.0 - 53.0 %    MCV 87 78 - 100 fL    MCH 30.4 26.5 - 33.0 pg    MCHC 34.9 31.5 - 36.5 g/dL    RDW 12.7 10.0 - 15.0 %    Platelet Count 348 150 - 450 10e3/uL   Luteinizing Hormone     Status: Abnormal   Result Value Ref Range    Luteinizing Hormone 11.9 (H) 1.7 - 8.6 mIU/mL   Prolactin     Status: Normal   Result Value Ref Range    Prolactin 14 4 - 15 ng/mL   Follicle stimulating hormone     Status: Abnormal   Result Value Ref Range    FSH  25.8 (H) 1.5 - 12.4 mIU/mL   TSH     Status: Normal   Result Value Ref Range    TSH 1.47 0.30 - 4.20 uIU/mL   Iron & Iron Binding Capacity     Status: Abnormal   Result Value Ref Range    Iron 59 (L) 61 - 157 ug/dL    Iron Binding Capacity 287 240 - 430 ug/dL    Iron Sat Index 21 15 - 46 %   Sex Hormone Binding Globulin     Status: Normal   Result Value Ref Range    Sex Hormone Binding Globulin 11 11 - 80 nmol/L   Testosterone Free and Total     Status: None (In process)    Narrative    The following orders were created for panel order Testosterone Free and Total.  Procedure                               Abnormality         Status                     ---------                               -----------         ------                     Sex Hormone Binding Glob...[[id number]]  Normal              Final result               Testosterone Free and Total[[id number]]                      In process                   Please view results for these tests on the individual orders.       Assessment:     1. Low testosterone    2. Thyroid nodule    3. Primary hypertension    4. Abnormal LFTs    5. Primary insomnia    6. Multiple joint pain    7. Tinnitus, right    8. Discomfort of right groin    9. Testicular swelling, right    10. Erectile dysfunction, unspecified erectile dysfunction type        Plan:     Check lab work today.     Ultrasound thyroid and testicles.  Consider testosterone therapy and karyotype.  Referral to dermatology and otolaryngology (ENT).  Prescribed cyclobenzaprine (Flexeril).  Request records [facility].  Continue current medications otherwise.  Follow up sooner if issues.    Orders Placed This Encounter   Procedures    US Thyroid    US Testicular & Scrotum w Doppler Ltd    Comprehensive metabolic panel    CRP inflammation    CBC with platelets    Luteinizing Hormone    Prolactin    Follicle stimulating hormone    TSH    Iron & Iron Binding Capacity    Sex Hormone Binding Globulin    Testosterone Free and  Total    Adult ENT  Referral    Testosterone Free and Total           Jose Miguel Marv Frias MD  General Internal Medicine  [facility]    The longitudinal plan of care for the diagnoses and conditions as documented were addressed during this visit. Due to the added complexity in care, I will continue to support Aydin in the subsequent management and with ongoing continuity of care.     Return in about 1 year (around 10/10/2025) for physical.     No future appointments.

## 2024-10-14 ENCOUNTER — TELEPHONE (OUTPATIENT)
Dept: INTERNAL MEDICINE | Facility: CLINIC | Age: 54
End: 2024-10-14
Payer: COMMERCIAL

## 2024-10-14 DIAGNOSIS — E29.1 MALE HYPOGONADISM: Primary | ICD-10-CM

## 2024-10-14 LAB
TESTOST FREE SERPL-MCNC: 4.84 NG/DL
TESTOST SERPL-MCNC: 159 NG/DL (ref 240–950)

## 2024-10-14 RX ORDER — TESTOSTERONE 1.62 MG/G
2 GEL TRANSDERMAL DAILY
Qty: 75 G | Refills: 5 | Status: SHIPPED | OUTPATIENT
Start: 2024-10-14

## 2024-10-14 NOTE — TELEPHONE ENCOUNTER
Please call patient -    ______________________________________________________________________     Home phone:  [phone] (home)     Cell phone:   Telephone Information:   Mobile [phone]       Other contacts:  Name Home Phone Work Phone Mobile Phone Relationship Lgl GrMARISSA Taylor [phone] Spouse      ______________________________________________________________________     His potassium is doing okay.    Your potassium level was normal.  Kidney tests normal.    Liver function tests slightly elevated likely related to weight.    Your thyroid tests are excellent.     Testosterone level is low again and pituitary hormones suggest he may have Klinefelter's syndrome.  This is a genetic syndrome which can result in low testosterone.    Would like him to come in to do 1 more additional test - - karyotype to confirm.  Lab only.    I would recommend testosterone replacement to see if this helps him with erectile dysfunction (ED) or other concerns.    I will send in a testosterone gel to [facility] for him to start and we can determine follow up once the other blood work is back.    Jose Miguel Frias MD  [facility]  10/14/2024, 4:35 PM     ______________________________________________________________________    Recent Results (from the past 240 hour(s))   Comprehensive metabolic panel    Collection Time: 10/10/24  1:19 PM   Result Value Ref Range    Sodium 139 135 - 145 mmol/L    Potassium 3.6 3.4 - 5.3 mmol/L    Carbon Dioxide (CO2) 26 22 - 29 mmol/L    Anion Gap 14 7 - 15 mmol/L    Urea Nitrogen 15.6 6.0 - 20.0 mg/dL    Creatinine 1.00 0.67 - 1.17 mg/dL    GFR Estimate 90 >60 mL/min/1.73m2    Calcium 9.6 8.8 - 10.4 mg/dL    Chloride 99 98 - 107 mmol/L    Glucose 104 (H) 70 - 99 mg/dL    Alkaline Phosphatase 84 40 - 150 U/L    AST 43 0 - 45 U/L    ALT 95 (H) 0 - 70 U/L    Protein Total 8.1 6.4 - 8.3 g/dL    Albumin 5.0 3.5 - 5.2 g/dL    Bilirubin Total 0.4 <=1.2  mg/dL   CRP inflammation    Collection Time: 10/10/24  1:19 PM   Result Value Ref Range    CRP Inflammation 5.57 (H) <5.00 mg/L   CBC with platelets    Collection Time: 10/10/24  1:19 PM   Result Value Ref Range    WBC Count 9.9 4.0 - 11.0 10e3/uL    RBC Count 5.13 4.40 - 5.90 10e6/uL    Hemoglobin 15.6 13.3 - 17.7 g/dL    Hematocrit 44.7 40.0 - 53.0 %    MCV 87 78 - 100 fL    MCH 30.4 26.5 - 33.0 pg    MCHC 34.9 31.5 - 36.5 g/dL    RDW 12.7 10.0 - 15.0 %    Platelet Count 348 150 - 450 10e3/uL   Luteinizing Hormone    Collection Time: 10/10/24  1:19 PM   Result Value Ref Range    Luteinizing Hormone 11.9 (H) 1.7 - 8.6 mIU/mL   Prolactin    Collection Time: 10/10/24  1:19 PM   Result Value Ref Range    Prolactin 14 4 - 15 ng/mL   Follicle stimulating hormone    Collection Time: 10/10/24  1:19 PM   Result Value Ref Range    FSH 25.8 (H) 1.5 - 12.4 mIU/mL   TSH    Collection Time: 10/10/24  1:19 PM   Result Value Ref Range    TSH 1.47 0.30 - 4.20 uIU/mL   Iron & Iron Binding Capacity    Collection Time: 10/10/24  1:19 PM   Result Value Ref Range    Iron 59 (L) 61 - 157 ug/dL    Iron Binding Capacity 287 240 - 430 ug/dL    Iron Sat Index 21 15 - 46 %   Sex Hormone Binding Globulin    Collection Time: 10/10/24  1:19 PM   Result Value Ref Range    Sex Hormone Binding Globulin 11 11 - 80 nmol/L   Testosterone Free and Total    Collection Time: 10/10/24  1:19 PM   Result Value Ref Range    Free Testosterone Calculated 4.84 ng/dL    Testosterone Total 159 (L) 240 - 950 ng/dL      ______________________________________________________________________

## 2024-10-14 NOTE — TELEPHONE ENCOUNTER
Called and spoke with pt regarding lab results and recommendations.    Pt verbalized understanding, and has a follow up lab appointment scheduled for 10/23.    Pt is going out of town tomorrow and he wont be able to  gel prescription until 10/23.     Pt requesting PCP delay prescription or send in new one on 10/23.    GAMAL Dial.

## 2024-10-23 ENCOUNTER — LAB (OUTPATIENT)
Dept: LAB | Facility: CLINIC | Age: 54
End: 2024-10-23
Payer: COMMERCIAL

## 2024-10-23 DIAGNOSIS — E29.1 MALE HYPOGONADISM: ICD-10-CM

## 2024-10-23 PROCEDURE — 36415 COLL VENOUS BLD VENIPUNCTURE: CPT

## 2024-10-23 PROCEDURE — 88230 TISSUE CULTURE LYMPHOCYTE: CPT

## 2024-10-23 PROCEDURE — 88291 CYTO/MOLECULAR REPORT: CPT | Performed by: MEDICAL GENETICS

## 2024-10-23 PROCEDURE — 88263 CHROMOSOME ANALYSIS 45: CPT

## 2024-11-04 LAB
CULTURE HARVEST COMPLETE DATE: NORMAL
INTERPRETATION: NORMAL
ISCN: NORMAL
METHODS: NORMAL

## 2024-11-11 ENCOUNTER — TELEPHONE (OUTPATIENT)
Dept: INTERNAL MEDICINE | Facility: CLINIC | Age: 54
End: 2024-11-11
Payer: COMMERCIAL

## 2024-11-11 NOTE — TELEPHONE ENCOUNTER
Called and spoke with pt regarding testing results/recommendations per provider.    Pt verbalized understanding and requested mychart message with information to contact imaging.    Writer sent mychart message.    No further questions at this time.    GAMAL Dial.

## 2024-11-11 NOTE — TELEPHONE ENCOUNTER
Please call patient -    ______________________________________________________________________     Home phone:  [phone] (home)     Cell phone:   Telephone Information:   Mobile [phone]       Other contacts:  Name Home Phone Work Phone Mobile Phone Relationship Lgl Grrashid   AMANMARISSA EATON [phone] Spouse      ______________________________________________________________________     Genetic testing negative for Klinefelter's syndrome, which could have caused the low testosterone.    He should still get the thyroid ultrasound and the testicular ultrasound to look at this further.  Call [phone] to schedule.    Thank you.      Jose Miguel Frias MD  [facility]  11/11/2024, 11:28 AM

## 2024-11-21 ENCOUNTER — TELEPHONE (OUTPATIENT)
Dept: INTERNAL MEDICINE | Facility: CLINIC | Age: 54
End: 2024-11-21
Payer: COMMERCIAL

## 2024-11-21 NOTE — TELEPHONE ENCOUNTER
FAX [facility] Prior Authorization Request 11/21/24      Disp Refills Start End MARIA C    testosterone (ANDROGEL 1.62 % PUMP) 20.25 MG/ACT gel 75 g 5 10/14/2024 -- No   Sig - Route: Place 2 Pump (40.5 mg) onto the skin daily. Apply from dispenser to clean, dry, intact skin of the upper arms and shoulders. - Transdermal   Sent to pharmacy as: Testosterone 20.25 MG/ACT (1.62%) Transdermal Gel (ANDROGEL 1.62 % PUMP)       Message:  Plan does not cover medication.  Please call plan at  to initiate Prior Auth    [employer]  ID: [id number]  [id number]  [place]: [id number]

## 2024-11-25 LAB — CULTURE HARVEST COMPLETE DATE: NORMAL

## 2024-11-25 NOTE — TELEPHONE ENCOUNTER
PA Initiation    Medication: TESTOSTERONE 20.25 MG/1.25GM (1.62%) TD GEL  Insurance Company: Comment:  [employer] [place] [phone]  Pharmacy Filling the Rx: [id number] [place] [address] AT [facility] [address]  Filling Pharmacy Phone: [phone]  Filling Pharmacy Fax:    Start Date: 11/25/2024

## 2024-12-27 ENCOUNTER — HOSPITAL ENCOUNTER (OUTPATIENT)
Dept: ULTRASOUND IMAGING | Facility: HOSPITAL | Age: 54
Discharge: HOME | End: 2024-12-27
Attending: INTERNAL MEDICINE
Payer: COMMERCIAL

## 2024-12-27 DIAGNOSIS — N50.89 TESTICULAR SWELLING, RIGHT: ICD-10-CM

## 2024-12-27 DIAGNOSIS — R10.31 DISCOMFORT OF RIGHT GROIN: ICD-10-CM

## 2024-12-27 DIAGNOSIS — E04.1 THYROID NODULE: ICD-10-CM

## 2024-12-27 PROCEDURE — 93976 VASCULAR STUDY: CPT

## 2024-12-27 PROCEDURE — 76536 US EXAM OF HEAD AND NECK: CPT

## 2024-12-27 PROCEDURE — 76870 US EXAM SCROTUM: CPT

## 2024-12-29 ENCOUNTER — TELEPHONE (OUTPATIENT)
Dept: INTERNAL MEDICINE | Facility: CLINIC | Age: 54
End: 2024-12-29
Payer: COMMERCIAL

## 2024-12-29 NOTE — TELEPHONE ENCOUNTER
Please call patient -    ______________________________________________________________________     Home Phone:  [phone] (home)     Cell phone:   Telephone Information:   Mobile [phone]     ______________________________________________________________________     His thyroid ultrasound is essentially stable from 2020.  No concerning findings consistent with thyroid cancer.    Testicle size slightly small which would go along with his low testosterone.    Benign appearing cyst of the right scrotum noted.    Some calcifications in the testes which might be associated with a slight increase in risk in testicular cancer.  Radiologist mentions to consider following yearly for now.  We can discuss this further at his next visit.    Jose Miguel Frias MD  [facility]  12/29/2024, 2:48 PM   ______________________________________________________________________    Pertinent radiology for this visit includes the following:    US Thyroid  Narrative: EXAM: US THYROID  LOCATION: [facility] [place]  DATE: 12/27/2024    INDICATION:  Thyroid nodule  COMPARISON: None.  TECHNIQUE: Thyroid ultrasound.     FINDINGS:  RIGHT lobe: 4.9 x 2.3 x 2.6 cm. Homogeneous echotexture.  Isthmus: 4 mm.  LEFT lobe: Surgically absent. No residual soft tissue seen in the thyroidectomy bed.     NECK: Scattered prominent lymph nodes in the neck. Largest on the right measures 2.1 x 1.0 x 0.6 cm. Largest on the left measures 1.9 x 1.0 x 0.7 cm.    NODULES:    Nodule 1: Right superior thyroid measuring 1.1 x 0.7 x 0.7 cm. Previously this measured 1.1 x 0.8 x 0.7 cm.   Composition: Spongiform, 0 points   Echogenicity: Hyperechoic or isoechoic, 1 point   Shape: Wider-than-tall, 0 points   Margin: Smooth, 0 points   Echogenic Foci: None, or large comet-tail artifacts, 0 points   Point Total: 1-2 points. TI-RADS 2. No FNA.      Nodule 2: Mid right measuring 0.9 x 1.0 x 0.7 cm  Composition: Spongiform, 0  points   Echogenicity: Hyperechoic or isoechoic, 1 point   Shape: Not taller than wide, 0 points   Margin: Smooth, 0 points   Echogenic Foci: None, or large comet-tail artifacts, 0 points   Point Total: 1-2 points. TI-RADS 2. No FNA.  Impression: IMPRESSION:  1.  Left thyroidectomy. No residual soft tissue in the thyroidectomy bed.  2.  Scattered prominent lymph nodes in the neck.  3.  2 benign appearing nodules in the right thyroid. Biopsy not recommended. Follow-up examination in one year recommended.    Nodules are characterized per  ACR Thyroid Imaging, Reporting and Data System (TI-RADS): White Paper of the ACR TI-RADS Committee  Goyo Gibson et al. Journal of the American College of Radiology 2017. Volume 14 (2017), Issue 5, 000-288.       ______________________________________________________________________       EXAM: US TESTICULAR AND SCROTUM WITH DOPPLER LIMITED  LOCATION: [facility]  DATE: 12/27/2024     INDICATION:  Discomfort of right groin, Testicular swelling, right  COMPARISON: None.  TECHNIQUE: Ultrasound of scrotum with color flow and spectral Doppler with waveform analysis performed.     FINDINGS:     RIGHT: Right testicle measures 2.7 x 2.2 x 1.9 cm. Scattered microcalcifications testicle. Otherwise, Normal testicle with no masses. Normal arterial duplex and normal color flow. Cyst in the superior right scrotum near the epididymis measuring 2.2 x 1.9   x 2.2 cm. Prominence of the adjacent spermatic cord. Otherwise, Normal epididymis. No hydrocele. No varicocele.     LEFT: Left testicle measures 2.6 x 2.1 x 1.4 cm. Scattered microcalcifications left testicle. Otherwise, Normal testicle with no masses. Normal arterial duplex and normal color flow. Normal epididymis. No hydrocele. Small left varicocele.                                                                      IMPRESSION:  1.  Cyst in the superior right scrotum near the epididymis measuring 2.2 x 1.9 x  2.2 cm. Prominence of the adjacent spermatic cord.  2.  Small left varicocele.  3.  Scattered microcalcifications in both testicles. No testicular mass. Recommend annual ultrasound surveillance for testicular microlithiasis and other risk factors for testicular cancer (e.g. Personal history of testicular cancer, history of   cryptorchidism or maldescent, testicular atrophy, or other risk factors), no further imaging or biochemical follow-up is necessary; all that is recommended is routine monthly testicular self-examination.

## 2024-12-30 NOTE — TELEPHONE ENCOUNTER
"Pt called and informed of test results/recommendations per provider. Pt verbalized understanding.     Should pt follow up with PCP in a year as mentioned in LOV notes?    Pt stated he travels for work and he is unable to travel with his current medication testosterone dispencer. He is wondering if there is an alternative he can bring with him during his travels to stay on track (Smaller size)?    Pt wondering if you received records from [facility] with his previous sleep medication he used to take? I don't see any transferred records on my end.    Muscle relaxant works but gives him a \"hangover feeling\" in the morning and this is not sustainable for long term use.-Per pt.    Pt would like call back with recommendations.    GAMAL Dial.  "

## 2025-01-02 RX ORDER — TESTOSTERONE 25 MG/2.5G
25 GEL TRANSDERMAL DAILY
Qty: 30 PACKET | Refills: 1 | Status: CANCELLED | OUTPATIENT
Start: 2025-01-02

## 2025-01-02 NOTE — TELEPHONE ENCOUNTER
"Relayed Dr. Frias's message.    He does not want to do the pellets as he thinks they sound painful.     Pt interested in a more travel friendly version of the gel. He states his uncle is on it and carries it around in a small \"like the mints you get at [employer]\" size. Wondering if he can be prescribed this same size.    Appt scheduled for now as he has other questions about sleep. He states that he was told at [facility] the  medication he was on was not habit forming. He states he thinks it was a little blue pill with a V on it. Educated to writer that unknown what the little blue pill could be.       "

## 2025-01-02 NOTE — TELEPHONE ENCOUNTER
Can do a 3 month follow up if he would like his testosterone levels checked and follow up on how this is going.    Other options for testosterone replacement include shots and implantable pellets.  For the implantable pellets, he would likely need to see urology.  The gel is usually the easiest.  If he misses this for a few days like 0-5 might not make a huge difference.    He was on Lunesta in the past at [facility], but this medication tends to be habit forming - addictive - so we try to avoid this if possible.  Another option to try would be trazodone if he wanted.  He could see a sleep specialist now or in the future if he wishes.    Jose Miguel Frias MD  General Internal Medicine  [facility]  1/2/2025, 9:44 AM

## 2025-01-09 ENCOUNTER — OFFICE VISIT (OUTPATIENT)
Dept: INTERNAL MEDICINE | Facility: CLINIC | Age: 55
End: 2025-01-09
Payer: COMMERCIAL

## 2025-01-09 VITALS
TEMPERATURE: 98.2 F | OXYGEN SATURATION: 98 % | HEART RATE: 96 BPM | WEIGHT: 296.8 LBS | HEIGHT: 75 IN | DIASTOLIC BLOOD PRESSURE: 80 MMHG | BODY MASS INDEX: 36.9 KG/M2 | RESPIRATION RATE: 10 BRPM | SYSTOLIC BLOOD PRESSURE: 130 MMHG

## 2025-01-09 DIAGNOSIS — F51.01 PRIMARY INSOMNIA: ICD-10-CM

## 2025-01-09 DIAGNOSIS — E66.01 MORBID OBESITY (H): ICD-10-CM

## 2025-01-09 DIAGNOSIS — I10 PRIMARY HYPERTENSION: Chronic | ICD-10-CM

## 2025-01-09 DIAGNOSIS — M25.50 MULTIPLE JOINT PAIN: ICD-10-CM

## 2025-01-09 DIAGNOSIS — E29.1 MALE HYPOGONADISM: Primary | ICD-10-CM

## 2025-01-09 DIAGNOSIS — N52.2 DRUG-INDUCED ERECTILE DYSFUNCTION: ICD-10-CM

## 2025-01-09 DIAGNOSIS — R25.2 MUSCLE CRAMPS: ICD-10-CM

## 2025-01-09 LAB
ANION GAP SERPL CALCULATED.3IONS-SCNC: 12 MMOL/L (ref 7–15)
BUN SERPL-MCNC: 14.3 MG/DL (ref 6–20)
CALCIUM SERPL-MCNC: 9.8 MG/DL (ref 8.8–10.4)
CHLORIDE SERPL-SCNC: 99 MMOL/L (ref 98–107)
CREAT SERPL-MCNC: 1.19 MG/DL (ref 0.67–1.17)
EGFRCR SERPLBLD CKD-EPI 2021: 73 ML/MIN/1.73M2
GLUCOSE SERPL-MCNC: 95 MG/DL (ref 70–99)
HCO3 SERPL-SCNC: 28 MMOL/L (ref 22–29)
MAGNESIUM SERPL-MCNC: 2.4 MG/DL (ref 1.7–2.3)
POTASSIUM SERPL-SCNC: 3.6 MMOL/L (ref 3.4–5.3)
RHEUMATOID FACT SERPL-ACNC: <10 IU/ML
SHBG SERPL-SCNC: 12 NMOL/L (ref 11–80)
SODIUM SERPL-SCNC: 139 MMOL/L (ref 135–145)

## 2025-01-09 RX ORDER — TESTOSTERONE 25 MG/2.5G
50 GEL TRANSDERMAL DAILY
Qty: 180 PACKET | Refills: 3 | Status: SHIPPED | OUTPATIENT
Start: 2025-01-09 | End: 2026-01-09

## 2025-01-09 RX ORDER — ESZOPICLONE 3 MG/1
3 TABLET, FILM COATED ORAL AT BEDTIME
Qty: 30 TABLET | Refills: 2 | Status: SHIPPED | OUTPATIENT
Start: 2025-01-09

## 2025-01-09 ASSESSMENT — PAIN SCALES - GENERAL: PAINLEVEL_OUTOF10: NO PAIN (0)

## 2025-01-09 NOTE — PROGRESS NOTES
[facility] - Primary Care Specialists    Comprehensive and complex medical care - Chronic disease management - Shared decision making - Care coordination - Compassionate care    Patient advocacy - Rational deprescribing - Minimally disruptive medicine - Ethical focus - Customized care         Date of Service: 1/9/2025  Primary Provider: Jose Miguel Frias    Patient Care Team:  Jose Miguel Frias MD as PCP - General (Internal Medicine)  Tyrese Chang MD as MD (Orthopedics)  Romina Hartman APRN CNP as Nurse Practitioner (Nurse Practitioner - Gerontology)  Jose Miguel Frias MD as Assigned PCP          Patient's Pharmacy:    [id number] - [place], [place] - [address]  [address]  [facility] [address]  Phone: [phone] Fax: [phone]    -[place], [place] - [place] - [address]  [address] [facility] [address]  Phone: [phone] Fax: [phone]     Patient's Contacts:  Name Home Phone Work Phone Mobile Phone Relationship Lgl MARISSA Garrison [phone] Spouse      Patient's Insurance:    Payor: BLUE PLUS / Plan: BLUE PLUS ADVANTAGE [place] / Product Type: HMO /            Active Problem List:  Problem List as of 1/9/2025 Reviewed: 8/15/2024 10:54 PM by Jose Miguel Frias MD         High    Nonsmoker       Medium    Primary hypertension    Primary osteoarthritis of both knees    Gastroesophageal reflux disease with esophagitis without hemorrhage    Idiopathic peripheral neuropathy    Dermatographic urticaria    Male hypogonadism       Low    Allergic rhinitis    External hemorrhoids    Normal Colonoscopy - 2019    Thyroid nodule    Morbid obesity (H)        Current Outpatient Medications   Medication Instructions    acetaminophen (TYLENOL) 650 mg, EVERY 4 HOURS PRN    albuterol (VENTOLIN HFA) 108 (90 Base) MCG/ACT inhaler 2 puffs, Inhalation, EVERY 6 HOURS PRN    aspirin 162  mg, Oral, DAILY    eszopiclone (LUNESTA) 3 mg, Oral, AT BEDTIME    gabapentin (NEURONTIN) 300 MG capsule 1 capsule, AT BEDTIME    losartan (COZAAR) 50 mg, Oral, DAILY    Multiple Vitamins-Minerals (MULTIVITAMIN ADULT PO) No dose, route, or frequency recorded.    omeprazole (PRILOSEC) 40 mg, Oral, DAILY    omeprazole (PRILOSEC) 40 mg, Oral, DAILY    terbinafine (LAMISIL) 1 % external cream APPLY TOPICALLY BETWEEN ALL TOES AT BEDTIME FOR 1 MONTH. APPLY SOCKS BEFORE GOING TO BEDTIME. AVOID SOCKS OR SHOES THAT MAKE TOO FOOT MOIST    testosterone (ANDROGEL 1.62 % PUMP) 40.5 mg, Transdermal, DAILY, Apply from dispenser to clean, dry, intact skin of the upper arms and shoulders.    testosterone (ANDROGEL) 25 MG/2.5GM (1%) topical gel 50 mg of testosterone, Transdermal, DAILY, Apply to the clean, dry intact skin of the shoulders, upper arms or abdomen.    triamterene-HCTZ (MAXZIDE) 75-50 MG tablet 1 tablet, Oral, DAILY    Vitamin C 500 mg, EVERY MORNING        Social History     Social History Narrative    .        Currently drives RV's cross country at times.       Subjective:     Aydin Aldridge is a 54 year old male who comes in today for:    Chief Complaint   Patient presents with    Follow Up     Testo    Spasms     Diaphragm, legs. Potassium levels?           1/9/2025    10:37 AM   Additional Questions   Roomed by SHARIFA Aragon   Accompanied by MAGDIEL     Patient comes in today for follow-up of a number of issues.    We first reviewed his hypogonadism.  He is on testosterone.  He is having troubles with transporting his medication as he is on the road quite a bit.  He may be gone from home up to 2 weeks at a time.    We reviewed using packets instead and this may be a reasonable option.    We reviewed his recent ultrasound of his testicles.  We reviewed the ultrasound of his thyroid which was also good.    He has been noticing some increased cramps in his legs and other places recently.    We reviewed his blood  "pressure and his blood pressure on recheck today was good.  He does have erectile dysfunction still.  He attributes this to triamterene-hydrochlorothiazide.  We talked about possibly changing the medication and see what happens.      We also reviewed his hand arthritis and he would like to have blood work to evaluate whether he has signs of rheumatoid arthritis.  It is in the IP joints as well as the knuckles at times.  He also notes knee pain.    We reviewed sleep medication.  He was previously on Lunesta when he was a patient of [facility].  He would like to try to restart this.  The cyclobenzaprine makes him too tired.    We reviewed his other issues noted in the assessment but not specifically addressed in the HPI above.     Objective:     Wt Readings from Last 3 Encounters:   01/09/25 134.6 kg (296 lb 12.8 oz)   10/10/24 127.5 kg (281 lb)   08/15/24 127.1 kg (280 lb 4.8 oz)     BP Readings from Last 3 Encounters:   01/09/25 130/80   10/10/24 120/80   08/15/24 (!) 155/84     /80 (BP Location: Right arm, Patient Position: Sitting, Cuff Size: Adult Large)   Pulse 96   Temp 98.2  F (36.8  C) (Temporal)   Resp 10   Ht 1.892 m (6' 2.5\")   Wt 134.6 kg (296 lb 12.8 oz)   SpO2 98%   BMI 37.60 kg/m     The patient is comfortable, no acute distress.  Mood good.  Insight good.  Hand exam shows no active synovitis of the hands.    Diagnostics:     Lab on 10/23/2024   Component Date Value Ref Range Status    ISCN 10/23/2024    Final                    Value:46,XY      Interpretation 10/23/2024    Final                    Value:These findings represent a normal 46,XY male karyotype.  No numerical or structural chromosomal abnormality was found.  All 50 of the metaphase cells examined had one X and one Y chromosome present.  No evidence of peripheral blood sex chromosome mosaicism was found.     Electronically signed by Nu Wilkins, Ph.D., [facility], Director Cytogenetics Laboratory and Cosigned by Gayatri Ozuna, " Imelda POP on 11/4/24 at 6:17 PM.      Methods 10/23/2024    Final                    Value:G-BAND ANALYSIS:      Metaphases analyzed:                  10     Metaphases screened:                  40    Metaphases karyotyped:               11    Banding utilized:                            G-banding  Band resolution:                            550-700        PHA stimulated, non-synchronized and MTX synchronized cultures.         Results for orders placed or performed in visit on 01/09/25   Basic metabolic panel     Status: Abnormal   Result Value Ref Range    Sodium 139 135 - 145 mmol/L    Potassium 3.6 3.4 - 5.3 mmol/L    Chloride 99 98 - 107 mmol/L    Carbon Dioxide (CO2) 28 22 - 29 mmol/L    Anion Gap 12 7 - 15 mmol/L    Urea Nitrogen 14.3 6.0 - 20.0 mg/dL    Creatinine 1.19 (H) 0.67 - 1.17 mg/dL    GFR Estimate 73 >60 mL/min/1.73m2    Calcium 9.8 8.8 - 10.4 mg/dL    Glucose 95 70 - 99 mg/dL   Magnesium     Status: Abnormal   Result Value Ref Range    Magnesium 2.4 (H) 1.7 - 2.3 mg/dL   Rheumatoid factor     Status: Normal   Result Value Ref Range    Rheumatoid Factor <10 <14 IU/mL   Sex Hormone Binding Globulin     Status: Normal   Result Value Ref Range    Sex Hormone Binding Globulin 12 11 - 80 nmol/L   Testosterone Bioavailable     Status: None (In process)    Narrative    The following orders were created for panel order Testosterone Bioavailable.  Procedure                               Abnormality         Status                     ---------                               -----------         ------                     Sex Hormone Binding Glob...[[id number]]  Normal              Final result               Testosterone Free and Total[[id number]]                      In process                   Please view results for these tests on the individual orders.        Assessment and Plan:     1. Morbid obesity (H)  This is complicated by hypertension.  Work on weight loss.    2. Male hypogonadism  (Primary)  Will recheck levels now that he has been using consistently over the last couple of weeks.   Will see if insurance covers testosterone packets.  This would make flying with testosterone more likely.    - testosterone (ANDROGEL) 25 MG/2.5GM (1%) topical gel; Place 2 packets (50 mg of testosterone) onto the skin daily. Apply to the clean, dry intact skin of the shoulders, upper arms or abdomen.  Dispense: 180 packet; Refill: 3  - Testosterone Bioavailable; Future  - Testosterone Bioavailable    3. Muscle cramps    - Basic metabolic panel; Future  - Magnesium; Future  - Basic metabolic panel  - Magnesium    4. Multiple joint pain    - Cyclic Citrullinated Peptide Antibody IgG; Future  - Rheumatoid factor; Future  - Cyclic Citrullinated Peptide Antibody IgG  - Rheumatoid factor    5. Primary insomnia  We reviewed other options and discussed potential negative effects of Lunesta.  He would like to try this again.    - eszopiclone (LUNESTA) 3 MG tablet; Take 1 tablet (3 mg) by mouth at bedtime.  Dispense: 30 tablet; Refill: 2    6. Primary hypertension  Consider stopping triamterene hydrochlorothiazide and trying amlodipine 5 mg a day.    7. Drug-induced erectile dysfunction  As above.     Continue current medications otherwise.  Follow up sooner if issues.          Jose Miguel Frias MD  General Internal Medicine  [facility]    The longitudinal plan of care for the diagnoses and conditions as documented were addressed during this visit. Due to the added complexity in care, I will continue to support Aydin in the subsequent management and with ongoing continuity of care.     Return in about 7 months (around 8/15/2025) for physical, visit and blood work.     No future appointments.      HCC issues resolved at this visit.  Wt Readings from Last 20 Encounters:   01/09/25 134.6 kg (296 lb 12.8 oz)   10/10/24 127.5 kg (281 lb)   08/15/24 127.1 kg (280 lb 4.8 oz)   05/16/23 127.9 kg (282  lb)   03/14/22 114.3 kg (252 lb)   12/10/21 115.7 kg (255 lb)   01/20/21 125.6 kg (276 lb 14.4 oz)   01/04/21 125.6 kg (277 lb)   10/15/20 125.9 kg (277 lb 9 oz)   10/02/20 126.2 kg (278 lb 4.8 oz)   09/28/20 125.2 kg (276 lb)   06/29/20 128 kg (282 lb 1.6 oz)   11/21/19 127.9 kg (282 lb)   09/13/19 125.1 kg (275 lb 12.8 oz)   06/07/19 119.3 kg (263 lb)   05/22/19 123.4 kg (272 lb)   05/10/19 124.6 kg (274 lb 11.2 oz)   04/26/19 123.4 kg (272 lb)   03/15/19 124.3 kg (274 lb)   02/21/19 124.9 kg (275 lb 4.8 oz)     BP Readings from Last 20 Encounters:   01/09/25 (!) 144/101   10/10/24 120/80   08/15/24 (!) 155/84   05/16/23 136/80   03/14/22 112/70   12/10/21 110/68   10/16/20 (!) 158/94   10/02/20 108/80   06/29/20 122/78   09/13/19 128/80   05/07/04 140/76   04/12/04 138/82   08/18/03 132/90      Pulse Readings from Last 20 Encounters:   01/09/25 96   10/10/24 98   08/15/24 (!) 125   05/16/23 90   03/14/22 69   12/10/21 83   10/16/20 82   10/02/20 92   06/29/20 80   09/13/19 88   05/07/04 84   04/12/04 84   08/18/03 62     SpO2 Readings from Last 20 Encounters:   01/09/25 98%   10/10/24 97%   08/15/24 96%   05/16/23 99%   03/14/22 96%   12/10/21 97%   10/16/20 98%   10/02/20 97%   09/13/19 97%

## 2025-01-09 NOTE — PROGRESS NOTES
"  {PROVIDER CHARTING PREFERENCE:454735}    Stefani Perrin is a 54 year old, presenting for the following health issues:  Follow Up (Testo) and Spasms (Diaphragm, legs. Potassium levels?)        1/9/2025    10:37 AM   Additional Questions   Roomed by SHARIFA Aragon   Accompanied by MAGDIEL     History of Present Illness       Reason for visit:  Follow up    He eats 2-3 servings of fruits and vegetables daily.He consumes 2 sweetened beverage(s) daily.He exercises with enough effort to increase his heart rate 9 or less minutes per day.  He exercises with enough effort to increase his heart rate 3 or less days per week.   He is taking medications regularly.       {MA/LPN/RN Pre-Provider Visit Orders- hCG/UA/Strep (Optional):960258}  {SUPERLIST (Optional):241943}  {additonal problems for provider to add (Optional):216010}    {ROS Picklists (Optional):286527}      Objective    BP (!) 144/101 (BP Location: Right arm, Patient Position: Sitting, Cuff Size: Adult Large)   Pulse 96   Temp 98.2  F (36.8  C) (Temporal)   Resp 10   Ht 1.892 m (6' 2.5\")   Wt 134.6 kg (296 lb 12.8 oz)   SpO2 98%   BMI 37.60 kg/m    Body mass index is 37.6 kg/m .  Physical Exam   {Exam List (Optional):893232}    {Diagnostic Test Results (Optional):160017}        Signed Electronically by: Jose Miguel Frias MD  {Email feedback regarding this note to [email or web address]   :568983}  "

## 2025-01-10 ENCOUNTER — TELEPHONE (OUTPATIENT)
Dept: INTERNAL MEDICINE | Facility: CLINIC | Age: 55
End: 2025-01-10
Payer: COMMERCIAL

## 2025-01-10 NOTE — TELEPHONE ENCOUNTER
Retail Pharmacy Prior Authorization Team   Phone: [phone]    PRIOR AUTHORIZATION DENIED    Medication: TESTOSTERONE 25 MG/2.5GM TD GEL  Insurance Company: [employer] - Phone [phone] Fax [phone]  Denial Date: 1/9/2025  Denial Reason(s): MUST HAVE HAD CLINICAL BENEFIT       Appeal Information: IF THE PROVIDER WOULD LIKE TO APPEAL THIS DECISION PLEASE PROVIDE THE PA TEAM WITH A LETTER OF MEDICAL NECESSITY      Patient Notified: NO

## 2025-01-10 NOTE — PATIENT INSTRUCTIONS
Preventative Measures:  Health Maintenance   Topic Date Due    Pneumococcal Vaccine: 50+ Years (1 of 1 - PCV) Never done    ZOSTER IMMUNIZATION (1 of 2) Never done    ANNUAL REVIEW OF HM ORDERS  12/10/2022    INFLUENZA VACCINE (1) 09/01/2024    COVID-19 Vaccine (1 - 2024-25 season) Never done    YEARLY PREVENTIVE VISIT  08/15/2025    DTAP/TDAP/TD IMMUNIZATION (2 - Td or Tdap) 08/26/2025    BMP  01/09/2026    ADVANCE CARE PLANNING  12/14/2026    GLUCOSE  01/09/2028    LIPID  08/14/2029    COLORECTAL CANCER SCREENING  11/22/2029    RSV VACCINE (1 - 1-dose 75+ series) 10/10/2046    HEPATITIS C SCREENING  Completed    HIV SCREENING  Completed    PHQ-2 (once per calendar year)  Completed    HEPATITIS B IMMUNIZATION  Completed    HPV IMMUNIZATION  Aged Out    MENINGITIS IMMUNIZATION  Aged Out    RSV MONOCLONAL ANTIBODY  Aged Out

## 2025-01-12 LAB
TESTOST FREE SERPL-MCNC: 17.34 NG/DL
TESTOST SERPL-MCNC: 536 NG/DL (ref 240–950)

## 2025-01-13 LAB — CCP AB SER IA-ACNC: 1.9 U/ML

## 2025-01-14 ENCOUNTER — TELEPHONE (OUTPATIENT)
Dept: INTERNAL MEDICINE | Facility: CLINIC | Age: 55
End: 2025-01-14
Payer: COMMERCIAL

## 2025-01-14 DIAGNOSIS — I10 PRIMARY HYPERTENSION: Primary | ICD-10-CM

## 2025-01-14 NOTE — TELEPHONE ENCOUNTER
The other option is to continue to use the triamterene/hydrochlorothiazide and then address the erectile dysfunction (ED) with sildenafil (Viagra) or tadalafil (Cialis) instead.    Yes there is a risk of leg swelling with stopping the medication and adding the amlodipine (Norvasc).    Jose Miguel Frias MD  General Internal Medicine  [facility]  1/14/2025, 4:19 PM

## 2025-01-14 NOTE — TELEPHONE ENCOUNTER
Please call patient -    ______________________________________________________________________     Home phone:  [phone] (home)     Cell phone:   Telephone Information:   Mobile [phone]       Other contacts:  Name Home Phone Work Phone Mobile Phone Relationship Lgl GrMARISSA Taylor [phone] Spouse      ______________________________________________________________________      Magnesium level okay.  Potassium level at the low end but not too low.  You can use things like bananas, citrus fruits, tomato products, or potato products to bring up your potassium.     No signs of diabetes.  Kidney function mildly elevated consistent with may be some mild dehydration.     Tests for rheumatoid arthritis (RA) were negative.    Testosterone level doing well on the testosterone replacement.    Related to the erectile dysfunction (ED) and blood pressure medications, I think a good option would be to stop the triamterene/hydrochlorothiazide and start amlodipine (Norvasc) 5 mg instead if he would be willing to try this.    Could schedule a nurse only blood pressure check if he wishes after this.       Jose Miguel Frias MD  [facility]  1/14/2025, 12:27 PM     ______________________________________________________________________    Recent Results (from the past 240 hours)   Basic metabolic panel    Collection Time: 01/09/25 11:54 AM   Result Value Ref Range    Sodium 139 135 - 145 mmol/L    Potassium 3.6 3.4 - 5.3 mmol/L    Chloride 99 98 - 107 mmol/L    Carbon Dioxide (CO2) 28 22 - 29 mmol/L    Anion Gap 12 7 - 15 mmol/L    Urea Nitrogen 14.3 6.0 - 20.0 mg/dL    Creatinine 1.19 (H) 0.67 - 1.17 mg/dL    GFR Estimate 73 >60 mL/min/1.73m2    Calcium 9.8 8.8 - 10.4 mg/dL    Glucose 95 70 - 99 mg/dL   Magnesium    Collection Time: 01/09/25 11:54 AM   Result Value Ref Range    Magnesium 2.4 (H) 1.7 - 2.3 mg/dL   Cyclic Citrullinated Peptide Antibody IgG    Collection Time:  01/09/25 11:54 AM   Result Value Ref Range    Cyclic Citrullinated Peptide Antibody IgG 1.9 <7.0 U/mL   Rheumatoid factor    Collection Time: 01/09/25 11:54 AM   Result Value Ref Range    Rheumatoid Factor <10 <14 IU/mL   Sex Hormone Binding Globulin    Collection Time: 01/09/25 11:54 AM   Result Value Ref Range    Sex Hormone Binding Globulin 12 11 - 80 nmol/L   Testosterone Free and Total    Collection Time: 01/09/25 11:54 AM   Result Value Ref Range    Free Testosterone Calculated 17.34 ng/dL    Testosterone Total 536 240 - 950 ng/dL      ______________________________________________________________________

## 2025-01-14 NOTE — TELEPHONE ENCOUNTER
MEDICATION APPEAL APPROVED    Medication: TESTOSTERONE 25 MG/2.5GM TD GEL  Authorization Effective Date: 1/1/2025  Authorization Expiration Date: 1/13/2026   Appeal Insurance Company: [employer]  [id number] [address] AT [facility] [address]  Patient Notified: YES (faxed approval letter to pharmacy and notified patient via mychart message)  Comments:

## 2025-01-14 NOTE — TELEPHONE ENCOUNTER
"Called patient and relayed provider result message.    He is concerned about switching to amlodipine as the triamterene-HCTZ \"water pill\" has played a role in his BP in the past, per patient. He is concerned about lower leg swelling too if taken off of it.    Patient said he will switch to amlodipine if that is what PCP recommends but wanted this noted. He also asked about possible side effects of amlodipine which writer reviewed off [employer] with him.    Would like call back.   "

## 2025-01-15 RX ORDER — AMLODIPINE BESYLATE 5 MG/1
5 TABLET ORAL DAILY
Qty: 90 TABLET | Refills: 3 | Status: SHIPPED | OUTPATIENT
Start: 2025-01-15 | End: 2026-01-10

## 2025-01-15 NOTE — TELEPHONE ENCOUNTER
LMTCB, please see Dr. Frias's message and see if patient wants to switch medication at this time or not.

## 2025-01-15 NOTE — TELEPHONE ENCOUNTER
Patient returns call. Reviewed Dr. Frias's message with patient.     Patient verbalized understanding and is willing to try the amlodipine as an alternative to the triamterene/hydrochlorothiazide. He requests prescription be sent to [facility] in [place]. He requests a note on prescription to state that he will call the pharmacy when he would like it fill.  Asked that patient contact the clinic if he does develop leg swelling.     GAMAL Henry   [facility]

## 2025-02-24 ENCOUNTER — TELEPHONE (OUTPATIENT)
Dept: INTERNAL MEDICINE | Facility: CLINIC | Age: 55
End: 2025-02-24
Payer: COMMERCIAL

## 2025-02-24 NOTE — TELEPHONE ENCOUNTER
Can offer follow up with me in a reserved slot next week to review this.    I am booked out tomorrow and out the rest of the week.    If this not adequate, then he could be seen in [facility] in the meantime.    He could go back to cardiology, but I am not sure they could see him in a timely fashion.    Jose Miguel Frias MD  General Internal Medicine  [facility]  2/24/2025, 3:39 PM

## 2025-02-24 NOTE — TELEPHONE ENCOUNTER
Pt calling, stating he has stopped taking his Amlodipine due to extreme water retention. Pt stating that his ankles are so huge right now, the water pill has only been helping some what. He is wondering what he is needing to do, does he need to be on a different BP medication or should he go see his cardiologist? Pt did state he went back on a BP medication he use to take. Pt would like a call back

## 2025-04-12 ENCOUNTER — TELEPHONE (OUTPATIENT)
Dept: INTERNAL MEDICINE | Facility: CLINIC | Age: 55
End: 2025-04-12
Payer: COMMERCIAL

## 2025-04-12 DIAGNOSIS — I10 PRIMARY HYPERTENSION: ICD-10-CM

## 2025-04-12 NOTE — TELEPHONE ENCOUNTER
Please call patient -    ______________________________________________________________________     Home phone:  [phone] (home)     Cell phone:   Telephone Information:   Mobile [phone]       Other contacts:  Name Home Phone Work Phone Mobile Phone Relationship Lgl GrMARISSA Taylor [phone] Spouse      ______________________________________________________________________     Blood work overall good and potassium is not too low but on the lower end of normal.    Please check out what his concerns with the current regimen are and what he would like to do with it.  This was discussed at the blood pressure check with the MA.    Please make the patient aware that I am out of the office until Thursday and will not be able to respond until this time.    Thank you,    Jose Miguel Frias MD  [facility]  4/12/2025, 2:09 PM     ______________________________________________________________________    Recent Results (from the past 240 hours)   Basic metabolic panel    Collection Time: 04/11/25 10:57 AM   Result Value Ref Range    Sodium 140 135 - 145 mmol/L    Potassium 3.4 3.4 - 5.3 mmol/L    Chloride 99 98 - 107 mmol/L    Carbon Dioxide (CO2) 27 22 - 29 mmol/L    Anion Gap 14 7 - 15 mmol/L    Urea Nitrogen 8.6 6.0 - 20.0 mg/dL    Creatinine 1.05 0.67 - 1.17 mg/dL    GFR Estimate 85 >60 mL/min/1.73m2    Calcium 10.0 8.8 - 10.4 mg/dL    Glucose 157 (H) 70 - 99 mg/dL      ______________________________________________________________________

## 2025-04-18 NOTE — TELEPHONE ENCOUNTER
He could go back the medications he used to be taking the triamterene/hydrochlorothiazide and losartan (Cozaar) as this was working for his blood pressure.    An increase in losartan (Cozaar) is unlikely to work on its own usually.    Can use a reserved slot if the patient would like to review further.    Jose Miguel Frias MD  General Internal Medicine  [facility]  4/18/2025, 3:14 PM

## 2025-04-18 NOTE — TELEPHONE ENCOUNTER
Patient Returning Call    Reason for call:  Returning Call    Information relayed to patient:  relayed message    Patient has additional questions:  Yes    What are your questions/concerns:  patient requests to speak with RN. Call transferred to RN line.

## 2025-04-21 ENCOUNTER — TELEPHONE (OUTPATIENT)
Dept: INTERNAL MEDICINE | Facility: CLINIC | Age: 55
End: 2025-04-21

## 2025-04-21 DIAGNOSIS — R06.2 WHEEZING: ICD-10-CM

## 2025-04-21 RX ORDER — ALBUTEROL SULFATE 90 UG/1
AEROSOL, METERED RESPIRATORY (INHALATION)
Qty: 18 G | Refills: 3 | Status: SHIPPED | OUTPATIENT
Start: 2025-04-21

## 2025-04-21 NOTE — TELEPHONE ENCOUNTER
General Call      Reason for Call:  Returned call to clinic    What are your questions or concerns:  RN note states they called patient. RN unavailable to transfer call    Date of last appointment with provider: 3/7/2025    Could we send this information to you in [employer] or would you prefer to receive a phone call?:   Patient would prefer a phone call     Okay to leave a detailed message?: N/A at Cell number on file:    Telephone Information:   Mobile [phone]

## 2025-04-22 RX ORDER — TRIAMTERENE AND HYDROCHLOROTHIAZIDE 75; 50 MG/1; MG/1
1 TABLET ORAL DAILY
Qty: 90 TABLET | Refills: 3 | Status: SHIPPED | OUTPATIENT
Start: 2025-04-22

## 2025-04-22 NOTE — TELEPHONE ENCOUNTER
Call back:    Restart triamterene/hydrochlorothiazide and a prescription was sent.  Stop the torsemide and the potassium at this time.    Could recheck blood pressure in 4-6 weeks with RN or me if desired.    Jose Miguel Frias MD  General Internal Medicine  [facility]  4/22/2025, 2:13 PM

## 2025-04-22 NOTE — TELEPHONE ENCOUNTER
Patient agreeable to return to taking triamterene/hydrochlorothiazide 75-50 and losartan (Cozaar) 50.  Will discontinue torsemide.  Patient needs new Rx for triamterene/hydrochlorothiazide, has refills on file of losartan 50 MG.    Is patient to discontinue amlodipine and torsemide?  Is he to continue potassium chloride?     Amlodipine is still active on med list, however per chart review discontinued at 3/7/25 visit.      3/7/25 Visit notes:  Primary hypertension:  - Hypertension management complicated by water retention and erectile dysfunction. Current regimen includes amlodipine and losartan. Amlodipine contributing to swelling.  - Discontinue amlodipine. Initiate torsemide with potassium supplement. Monitor blood pressure and potassium levels in one month. Consider increasing losartan to 100 mg if needed.  - Risks and side effects: Potential for increased urination with torsemide. Potassium levels to be monitored due to history of low potassium.

## 2025-04-24 NOTE — TELEPHONE ENCOUNTER
Patient contacted and notified of Dr. Frias's response and recommendations. Patient verbalized understanding.     GAMAL Henry   [facility]

## 2025-05-25 DIAGNOSIS — K22.10 EROSIVE ESOPHAGITIS: ICD-10-CM

## 2025-05-27 RX ORDER — OMEPRAZOLE 40 MG/1
40 CAPSULE, DELAYED RELEASE ORAL DAILY
Qty: 90 CAPSULE | Refills: 3 | OUTPATIENT
Start: 2025-05-27

## 2025-06-06 NOTE — ANESTHESIA PROCEDURE NOTES
Spinal/LP Procedure Note    Spinal Block      Staff -   Anesthesiologist:  Ruddy Monzon MD  Performed By: anesthesiologist  Location: In OR BEFORE Induction  Procedure Start/Stop Times:      10/15/2020 7:35 AM     10/15/2020 7:48 AM    patient identified, IV checked, site marked, risks and benefits discussed, informed consent, monitors and equipment checked, pre-op evaluation, at physician/surgeon's request and post-op pain management      Correct Patient: Yes      Correct Position: Yes      Correct Site: Yes      Correct Procedure: Yes      Correct Laterality:  Yes    Site Marked:  Yes  Procedure:     Procedure:  Intrathecal    ASA:  2    Diagnosis:  Pain    Position:  Sitting    Sterile Prep: chloraprep      Insertion site:  L3-4    Approach:  Midline    Needle Type:  Francisco J    Needle gauge (G):  25    Local Skin Infiltration:  1% lidocaine    amount (ml):  4    Needle Length (in):  3.5    Introducer used: Yes      Introducer gauge:  20 G    Attempts:  2    Redirects:  0    CSF:  Clear    Paresthesias:  No  Assessment/Narrative:      Unsuccessful attempt for spinal by anesthesia resident.  Successful on 2nd attempt by myself (Dr. Monzon).         .

## 2025-06-24 ENCOUNTER — TELEPHONE (OUTPATIENT)
Dept: INTERNAL MEDICINE | Facility: CLINIC | Age: 55
End: 2025-06-24
Payer: COMMERCIAL

## 2025-06-24 DIAGNOSIS — I10 PRIMARY HYPERTENSION: ICD-10-CM

## 2025-06-24 DIAGNOSIS — K22.10 EROSIVE ESOPHAGITIS: ICD-10-CM

## 2025-06-24 RX ORDER — OMEPRAZOLE 40 MG/1
40 CAPSULE, DELAYED RELEASE ORAL DAILY
Qty: 90 CAPSULE | Refills: 3 | Status: SHIPPED | OUTPATIENT
Start: 2025-06-24

## 2025-06-24 RX ORDER — LOSARTAN POTASSIUM 50 MG/1
50 TABLET ORAL DAILY
Qty: 90 TABLET | Refills: 3 | OUTPATIENT
Start: 2025-06-24

## 2025-06-24 NOTE — TELEPHONE ENCOUNTER
General Call    Contacts       Contact Date/Time Type Contact Phone/Fax    06/24/2025 01:38 PM CDT Phone (Incoming) Aydin Aldridge (Self) [phone] (M)          Reason for Call:  Prior Authorization    What are your questions or concerns:  Requesting PA process starts for Omeprazole.  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 (Refer to 7/13/24 Encounter)     Medication meron'd up for approval to begin Prior Auth process.    [id number] [place], [place] - [address] [facility] [address]       Date of last appointment with provider: 03/07/2025    Could we send this information to you in [employer] or would you prefer to receive a phone call?:   Patient would prefer a phone call   Okay to leave a detailed message?: Yes at Cell number on file:    Telephone Information:   Mobile [phone]

## 2025-07-16 ENCOUNTER — PATIENT OUTREACH (OUTPATIENT)
Dept: CARE COORDINATION | Facility: CLINIC | Age: 55
End: 2025-07-16
Payer: COMMERCIAL

## 2025-07-23 DIAGNOSIS — E29.1 MALE HYPOGONADISM: ICD-10-CM

## 2025-07-23 DIAGNOSIS — I10 PRIMARY HYPERTENSION: ICD-10-CM

## 2025-07-27 RX ORDER — LOSARTAN POTASSIUM 50 MG/1
50 TABLET ORAL DAILY
Qty: 90 TABLET | Refills: 3 | Status: SHIPPED | OUTPATIENT
Start: 2025-07-27

## 2025-07-27 RX ORDER — TESTOSTERONE GEL, 1% 10 MG/G
GEL TRANSDERMAL
Qty: 150 G | Refills: 5 | Status: SHIPPED | OUTPATIENT
Start: 2025-07-27

## 2025-07-30 ENCOUNTER — PATIENT OUTREACH (OUTPATIENT)
Dept: CARE COORDINATION | Facility: CLINIC | Age: 55
End: 2025-07-30
Payer: COMMERCIAL

## (undated) DEVICE — SYR 10ML FINGER CONTROL W/O NDL 309695

## (undated) DEVICE — LINEN TOWEL PACK X5 5464

## (undated) DEVICE — SU ETHIBOND 1 CT-1 30" X425H

## (undated) DEVICE — GLOVE PROTEXIS BLUE W/NEU-THERA 8.0  2D73EB80

## (undated) DEVICE — SOL NACL 0.9% IRRIG 3000ML BAG 2B7477

## (undated) DEVICE — BASIN SET MAJOR

## (undated) DEVICE — DRSG TEGADERM 4X4 3/4" 1626W

## (undated) DEVICE — SOL NACL 0.9% IRRIG 1000ML BOTTLE 2F7124

## (undated) DEVICE — BONE CEMENT MIXEVAC III HI VAC KIT  0206-015-000

## (undated) DEVICE — Device

## (undated) DEVICE — DRSG DRAIN 4X4" 7086

## (undated) DEVICE — ESU GROUND PAD UNIVERSAL W/O CORD

## (undated) DEVICE — SU MONOCRYL 3-0 PS-1 27" Y936H

## (undated) DEVICE — SUCTION IRR SYSTEM W/O TIP INTERPULSE HANDPIECE 0210-100-000

## (undated) DEVICE — BLADE SAW SAGITTAL STRK 18X90X1.27MM HD SYS 6 6118-127-090

## (undated) DEVICE — SU MONOCRYL 4-0 PS-2 18" UND Y496G

## (undated) DEVICE — DRSG STERI STRIP 1X5" R1548

## (undated) DEVICE — SOL WATER IRRIG 1000ML BOTTLE 2F7114

## (undated) DEVICE — SUCTION MANIFOLD DORNOCH ULTRA CART UL-CL500

## (undated) DEVICE — NDL 22GA 1.5"

## (undated) DEVICE — SPONGE LAP 18X18" X8435

## (undated) DEVICE — DRAIN HEMOVAC RESERVOIR KIT 10FR 1/8" MED 00-2550-002-10

## (undated) DEVICE — CARD NAVIGATION SOFTWARE STRK KNEE C4C 6003-640-999

## (undated) DEVICE — LINEN BACK PACK 5440

## (undated) DEVICE — CAST PADDING 6" STERILE 9046S

## (undated) DEVICE — DRSG ABDOMINAL 07 1/2X8" 7197D

## (undated) DEVICE — SOL ISOPROPYL RUBBING ALCOHOL USP 70% 4OZ HDX-20 I0020

## (undated) DEVICE — DRSG KERLIX FLUFFS X5

## (undated) DEVICE — SU VICRYL 2-0 CT-2 27" UND J269H

## (undated) DEVICE — GOWN XLG DISP 9545

## (undated) DEVICE — BONE CLEANING TIP INTERPULSE  0210-010-000

## (undated) DEVICE — BATTERY STRYKER NAVIGATION SYSTEM 6000-006-000

## (undated) DEVICE — PEN MARKING SKIN W/LABELS 31145918

## (undated) DEVICE — HOOD FLYTE W/PEELAWAY 408-800-100

## (undated) DEVICE — GLOVE PROTEXIS W/NEU-THERA 7.5  2D73TE75

## (undated) DEVICE — STRAP KNEE/BODY 31143004

## (undated) DEVICE — TOURNIQUET CUFF 30" REPRO BLUE 60-7070-105

## (undated) DEVICE — SU VICRYL 1 CT-1 36" UND J947H

## (undated) DEVICE — PREP DURAPREP 26ML APL 8630

## (undated) DEVICE — PIN FIXATION STRK EX-PIN ORTHOLOCK 3X110MM 6007-103-110

## (undated) RX ORDER — CELECOXIB 200 MG/1
CAPSULE ORAL
Status: DISPENSED
Start: 2020-10-15

## (undated) RX ORDER — FENTANYL CITRATE-0.9 % NACL/PF 10 MCG/ML
PLASTIC BAG, INJECTION (ML) INTRAVENOUS
Status: DISPENSED
Start: 2020-10-15

## (undated) RX ORDER — LIDOCAINE HYDROCHLORIDE 10 MG/ML
INJECTION, SOLUTION EPIDURAL; INFILTRATION; INTRACAUDAL; PERINEURAL
Status: DISPENSED
Start: 2021-01-22

## (undated) RX ORDER — PROPOFOL 10 MG/ML
INJECTION, EMULSION INTRAVENOUS
Status: DISPENSED
Start: 2020-10-15

## (undated) RX ORDER — FENTANYL CITRATE 50 UG/ML
INJECTION, SOLUTION INTRAMUSCULAR; INTRAVENOUS
Status: DISPENSED
Start: 2020-10-15

## (undated) RX ORDER — BUPIVACAINE HYDROCHLORIDE AND EPINEPHRINE 5; 5 MG/ML; UG/ML
INJECTION, SOLUTION PERINEURAL
Status: DISPENSED
Start: 2020-10-15

## (undated) RX ORDER — LIDOCAINE HYDROCHLORIDE 10 MG/ML
INJECTION, SOLUTION INFILTRATION; PERINEURAL
Status: DISPENSED
Start: 2018-04-12

## (undated) RX ORDER — LIDOCAINE HYDROCHLORIDE 10 MG/ML
INJECTION, SOLUTION EPIDURAL; INFILTRATION; INTRACAUDAL; PERINEURAL
Status: DISPENSED
Start: 2019-11-21

## (undated) RX ORDER — BUPIVACAINE HYDROCHLORIDE 2.5 MG/ML
INJECTION, SOLUTION EPIDURAL; INFILTRATION; INTRACAUDAL
Status: DISPENSED
Start: 2020-10-15

## (undated) RX ORDER — TRIAMCINOLONE ACETONIDE 40 MG/ML
INJECTION, SUSPENSION INTRA-ARTICULAR; INTRAMUSCULAR
Status: DISPENSED
Start: 2020-10-15

## (undated) RX ORDER — TRIAMCINOLONE ACETONIDE 40 MG/ML
INJECTION, SUSPENSION INTRA-ARTICULAR; INTRAMUSCULAR
Status: DISPENSED
Start: 2019-11-21

## (undated) RX ORDER — TRIAMCINOLONE ACETONIDE 40 MG/ML
INJECTION, SUSPENSION INTRA-ARTICULAR; INTRAMUSCULAR
Status: DISPENSED
Start: 2018-04-12

## (undated) RX ORDER — TRIAMCINOLONE ACETONIDE 40 MG/ML
INJECTION, SUSPENSION INTRA-ARTICULAR; INTRAMUSCULAR
Status: DISPENSED
Start: 2020-07-27

## (undated) RX ORDER — LIDOCAINE HYDROCHLORIDE 10 MG/ML
INJECTION, SOLUTION EPIDURAL; INFILTRATION; INTRACAUDAL; PERINEURAL
Status: DISPENSED
Start: 2019-03-07

## (undated) RX ORDER — TRANEXAMIC ACID 650 MG/1
TABLET ORAL
Status: DISPENSED
Start: 2020-10-15

## (undated) RX ORDER — ONDANSETRON 2 MG/ML
INJECTION INTRAMUSCULAR; INTRAVENOUS
Status: DISPENSED
Start: 2020-10-15

## (undated) RX ORDER — ACETAMINOPHEN 325 MG/1
TABLET ORAL
Status: DISPENSED
Start: 2020-10-15

## (undated) RX ORDER — TRIAMCINOLONE ACETONIDE 40 MG/ML
INJECTION, SUSPENSION INTRA-ARTICULAR; INTRAMUSCULAR
Status: DISPENSED
Start: 2019-03-07

## (undated) RX ORDER — LIDOCAINE HYDROCHLORIDE 10 MG/ML
INJECTION, SOLUTION EPIDURAL; INFILTRATION; INTRACAUDAL; PERINEURAL
Status: DISPENSED
Start: 2020-07-27

## (undated) RX ORDER — TRIAMCINOLONE ACETONIDE 40 MG/ML
INJECTION, SUSPENSION INTRA-ARTICULAR; INTRAMUSCULAR
Status: DISPENSED
Start: 2021-01-22

## (undated) RX ORDER — CEFAZOLIN SODIUM IN 0.9 % NACL 3 G/100 ML
INTRAVENOUS SOLUTION, PIGGYBACK (ML) INTRAVENOUS
Status: DISPENSED
Start: 2020-10-15